# Patient Record
Sex: FEMALE | Race: WHITE | NOT HISPANIC OR LATINO | Employment: FULL TIME | URBAN - METROPOLITAN AREA
[De-identification: names, ages, dates, MRNs, and addresses within clinical notes are randomized per-mention and may not be internally consistent; named-entity substitution may affect disease eponyms.]

---

## 2022-08-12 ENCOUNTER — TELEPHONE (OUTPATIENT)
Dept: OBGYN CLINIC | Facility: CLINIC | Age: 37
End: 2022-08-12

## 2022-08-12 NOTE — TELEPHONE ENCOUNTER
8/12/22 Patient scheduled a NPN appointment for 9/19/22 with Dr Sheehan Addison  Patient is transferring from [facility] -a fertility office  I emailed her a record release-she will get her records, she understands if records not here within 48 hrs of her appointment it will be cancelled  Patient is carrying twins-lmp 6/30/22  Patient not sure of date of discharge from [facility]

## 2022-08-24 NOTE — TELEPHONE ENCOUNTER
Per patient she has her last visit Kamari 8/25 (8 weeks), her MR Release is completed & will be given to [facility] office to fax all records , Nurse Yue Mesa is aware of the process

## 2022-08-31 NOTE — TELEPHONE ENCOUNTER
It looks like we received reports from [facility], please double check and let me know it we are missing anything for her 1st PN appointment   thanks

## 2022-09-13 ENCOUNTER — TELEPHONE (OUTPATIENT)
Dept: OBGYN CLINIC | Facility: CLINIC | Age: 37
End: 2022-09-13

## 2022-09-13 NOTE — TELEPHONE ENCOUNTER
Tanya potts she will be a new patient with [facility] next week  Transferring from fertility  Her fertility  has recommended cell free dna testing- materniti 21  She is asking if she should have this done and if that is what [facility] recommends  Return call to Tanya POTTS on v/m according to reviewed records she will be 11w6d at time of visit with [facility]  [facility] will provide referral for Maternal fetal medicine to complete NIPT testing  MFM prefers [facility] testing  Suggested she wait to have testing done with M who will be following her throughout remainder of pregnancy  Ultimately the choice is hers if she chooses to complete bloodwork now  C/B if has additiona questions or concerns

## 2022-09-19 ENCOUNTER — INITIAL PRENATAL (OUTPATIENT)
Dept: OBGYN CLINIC | Facility: CLINIC | Age: 37
End: 2022-09-19

## 2022-09-19 VITALS
DIASTOLIC BLOOD PRESSURE: 64 MMHG | HEIGHT: 72 IN | BODY MASS INDEX: 23.49 KG/M2 | WEIGHT: 173.4 LBS | SYSTOLIC BLOOD PRESSURE: 128 MMHG

## 2022-09-19 DIAGNOSIS — O31.21X1 TWIN PREGNANCY WITH SINGLE INTRAUTERINE DEATH IN FIRST TRIMESTER, FETUS 1 OF MULTIPLE GESTATION: ICD-10-CM

## 2022-09-19 DIAGNOSIS — Z36.89 ENCOUNTER FOR OTHER SPECIFIED ANTENATAL SCREENING: Primary | ICD-10-CM

## 2022-09-19 DIAGNOSIS — Z36.9 ENCOUNTER FOR ANTENATAL SCREENING: ICD-10-CM

## 2022-09-19 DIAGNOSIS — N97.9 INFERTILITY, FEMALE: ICD-10-CM

## 2022-09-19 DIAGNOSIS — O09.511 AMA (ADVANCED MATERNAL AGE) PRIMIGRAVIDA 35+, FIRST TRIMESTER: ICD-10-CM

## 2022-09-19 DIAGNOSIS — O09.811 PREGNANCY RESULTING FROM IN VITRO FERTILIZATION IN FIRST TRIMESTER: ICD-10-CM

## 2022-09-19 DIAGNOSIS — O30.009 TWIN PREGNANCY RESULTING FROM ASSISTED REPRODUCTIVE TECHNOLOGY (ART): Primary | ICD-10-CM

## 2022-09-19 DIAGNOSIS — O09.819 TWIN PREGNANCY RESULTING FROM ASSISTED REPRODUCTIVE TECHNOLOGY (ART): Primary | ICD-10-CM

## 2022-09-19 PROBLEM — O09.521 MULTIGRAVIDA OF ADVANCED MATERNAL AGE IN FIRST TRIMESTER: Status: RESOLVED | Noted: 2022-09-19 | Resolved: 2022-09-19

## 2022-09-19 PROBLEM — O09.521 MULTIGRAVIDA OF ADVANCED MATERNAL AGE IN FIRST TRIMESTER: Status: ACTIVE | Noted: 2022-09-19

## 2022-09-19 LAB
EXTERNAL CHLAMYDIA SCREEN: NEGATIVE
EXTERNAL GONORRHEA SCREEN: NEGATIVE
SL AMB  POCT GLUCOSE, UA: NEGATIVE
SL AMB POCT URINE PROTEIN: NEGATIVE

## 2022-09-19 RX ORDER — PROGESTERONE 200 MG/1
CAPSULE ORAL
COMMUNITY
End: 2022-10-12

## 2022-09-19 RX ORDER — DEXTROAMPHETAMINE SACCHARATE, AMPHETAMINE ASPARTATE, DEXTROAMPHETAMINE SULFATE AND AMPHETAMINE SULFATE 7.5; 7.5; 7.5; 7.5 MG/1; MG/1; MG/1; MG/1
30 TABLET ORAL DAILY
COMMUNITY
End: 2022-10-12

## 2022-09-19 RX ORDER — PROGESTERONE 100 MG/1
100 INSERT VAGINAL 2 TIMES DAILY
COMMUNITY
End: 2022-09-28 | Stop reason: SDUPTHER

## 2022-09-19 NOTE — PROGRESS NOTES
Patient called after speaking with Dr Nathalia Flores and updating that aderall was prescribed for inflammation from fertility specialist but recently told pt she could discontinue  Dr Nathalia Flores okay with pt discontinuing aderall nut also states it is okay if pt wants to resume  Until speaking with MFM in the next week at NT ultrasound  Patient understands and will probably continue taking until MFM visit

## 2022-09-19 NOTE — PROGRESS NOTES
OB INTAKE INTERVIEW  Patient is 29 y  o who presents for OB intake at 11 6 wks  She is accompanied by: Spouse   The father of her baby Henjacky Ingram) ispresent and supportive    Last Menstrual Period: 2022  Ultrasound: Measured 2022 weeks 8 3/8 42 days on 2022  Estimated Date of Delivery: 2023 confirmed by 12 1 week US    Signs/Symptoms of Pregnancy  Current pregnancy symptoms: nausea  Constipation no  Headaches no  Cramping/spotting no  PICA cravings no    Diabetes-  Body mass index is 22 88 kg/m²  If patient has 1 or more, please order early 1 hour GTT  History of GDM no  BMI >30 no  History of PCOS or current metformin use no  History of LGA/macrosomic infant (4000g/9lbs) no    If patient has 2 or more, please order early 1 hour GTT  AMA YES  First degree relative with type 2 diabetes no  History of chronic HTN, hyperlipidemia, elevated A1C no  High risk race (, , ,  or ) no    Hypertension- if you answer yes, please order preeclampsia labs (cbc, comprehensive metabolic panel, urine protein creatinine ratio, uric acid)  History of of chronic HTN no  History of gestational HTN no  History of preeclampsia, eclampsia, or HELLP syndrome no  History of diabetes no  History of lupus, autoimmune disease, kidney disease, antiphospholipid syndrome, rheumatoid arthritis, sjogren's syndrome no    Thyroid- if yes order TSH with reflex T4 (Unless TSH value on file within last 4 weeks then do not need to order)  History of thyroid disease no    Bleeding Disorder or Hx of DVT-patient or first degree relative with history of  Order the following if not done previously     (Factor V, antithrombin III, prothrombin gene mutation, protein C and S Ag, lupus anticoagulant, anticardiolipin, beta-2 glycoprotein)   no    OB/GYN-  History of abnormal pap smear no  History of HPV no  History of Herpes/HSV no  History of other STI (gonorrhea, chlamydia, trich) (or current partner with Hep B, Hep C, or HIV) no  History of prior  no  History of prior  no  History of  delivery prior to 36 weeks 6 days no  History of blood transfusion YES  Ok for blood transfusion YES    Substance screening-   History of tobacco use no  Currently using tobacco no  Currently using alcohol no  Presently using drugs no  Past drug use (if yes, order urine drug screening panel)  no  IV drug use (if yes, order urine drug screening panel) no  Partner drug use (if yes, order urine drug screening panel) no  Parent/Family drug use (do not order urine drug screening panel just for family hx) no    Depression Screening-  PHQ-9 Score: (1)    MRSA Screening-   Does the pt have a hx of MRSA? no  If yes- please follow MRSA protocol and obtain a nasal swab for MRSA culture at 12 week visit  Immunizations:  Influenza vaccine given this season (ask date) declined  Discussed Tdap vaccine (ask date) unsure  Discussed COVID Vaccine (request pt upload card or bring to next visit) YES, RECEIVED J &J 10/22/2021    Genetic/[facility]-  Do you or your partner have a history of any of the following in yourselves or first degree relatives? Cystic fibrosis no  Spinal muscular atrophy no  Hemoglobinopathy/Sickle Cell/Thalassemia no  Fragile X Intellectual Disability no    If yes, discuss carrier screening and recommend consultation with [facility]/genetic counseling  If no, discuss option for carrier screening and/or genetic testing with [facility]   Patient interested YES  Appointment at [facility] made  [address] education  [facility] Pregnancy Essentials Book reviewed and discussed YES      Prenatal lab work scripts YES    Extra labs ordered:  NONE    The patient has a history now or in prior pregnancy notable for:  MISSED AB TWIN #1, IVF    Details that I feel the provider should be aware of:   AMA  Di-Di IVF pregnancy, Twin #1 missed AB by ultrasound on 2022  Pt was taking adderall prescribed by [facility]- fertility for "inflammation", pt given option to discontinue now at 12 weeks or check with MFM at NT ultrasound appt  The patient was oriented to our practice, reviewed delivering physicians and Anna Taylor in [facility] for Delivery  All questions were answered      Interviewed by: Debi Sicard RN

## 2022-09-19 NOTE — ASSESSMENT & PLAN NOTE
Di-Di Twins via I V F  under [employer] (AYLEEN)  Start  mg P O  at 12 wks  Referred to [facility] for  testing

## 2022-09-22 LAB
C TRACH RRNA SPEC QL NAA+PROBE: NEGATIVE
N GONORRHOEA RRNA SPEC QL NAA+PROBE: NEGATIVE

## 2022-09-24 PROBLEM — O31.20X0 TWIN PREGNANCY WITH SINGLE INTRAUTERINE DEATH: Status: ACTIVE | Noted: 2022-09-24

## 2022-09-27 ENCOUNTER — TELEPHONE (OUTPATIENT)
Dept: PERINATAL CARE | Facility: CLINIC | Age: 37
End: 2022-09-27

## 2022-09-27 PROBLEM — O31.10X0 VANISHING TWIN SYNDROME: Status: ACTIVE | Noted: 2022-09-24

## 2022-09-27 LAB
EXTERNAL ANTIBODY SCREEN: NORMAL
EXTERNAL HEMOGLOBIN: 12.3 G/DL
EXTERNAL HEPATITIS B SURFACE ANTIGEN: NEGATIVE
EXTERNAL HIV-1/2 AB-AG: NORMAL
EXTERNAL PLATELET COUNT: 272 K/ÂΜL
EXTERNAL RH FACTOR: NEGATIVE
EXTERNAL RUBELLA IGG QUANTITATION: NORMAL
EXTERNAL SYPHILIS RPR SCREEN: NORMAL

## 2022-09-27 NOTE — TELEPHONE ENCOUNTER
Left patient a message that her MFM appointment had to be rescheduled  The new time, date and location were provided - 11/18/22  6:30am  [place]  The patient has been instructed to please call us back at [phone] with any questions or concerns

## 2022-09-28 ENCOUNTER — ROUTINE PRENATAL (OUTPATIENT)
Dept: PERINATAL CARE | Facility: OTHER | Age: 37
End: 2022-09-28
Payer: COMMERCIAL

## 2022-09-28 VITALS
WEIGHT: 177.4 LBS | HEIGHT: 72 IN | DIASTOLIC BLOOD PRESSURE: 76 MMHG | SYSTOLIC BLOOD PRESSURE: 122 MMHG | BODY MASS INDEX: 24.03 KG/M2 | HEART RATE: 84 BPM

## 2022-09-28 DIAGNOSIS — O09.811 PREGNANCY RESULTING FROM IN VITRO FERTILIZATION IN FIRST TRIMESTER: Primary | ICD-10-CM

## 2022-09-28 DIAGNOSIS — Z36.9 ENCOUNTER FOR ANTENATAL SCREENING: ICD-10-CM

## 2022-09-28 DIAGNOSIS — Z36.82 ENCOUNTER FOR (NT) NUCHAL TRANSLUCENCY SCAN: ICD-10-CM

## 2022-09-28 DIAGNOSIS — O09.511 AMA (ADVANCED MATERNAL AGE) PRIMIGRAVIDA 35+, FIRST TRIMESTER: ICD-10-CM

## 2022-09-28 DIAGNOSIS — Z3A.13 13 WEEKS GESTATION OF PREGNANCY: ICD-10-CM

## 2022-09-28 DIAGNOSIS — O31.10X0 VANISHING TWIN SYNDROME: ICD-10-CM

## 2022-09-28 LAB
ABO GROUP BLD: NORMAL
APPEARANCE UR: CLEAR
BACTERIA UR CULT: NORMAL
BACTERIA URNS QL MICRO: ABNORMAL
BASOPHILS # BLD AUTO: 0 X10E3/UL (ref 0–0.2)
BASOPHILS NFR BLD AUTO: 0 %
BILIRUB UR QL STRIP: NEGATIVE
BLD GP AB SCN SERPL QL: NEGATIVE
CASTS URNS QL MICRO: ABNORMAL /LPF
COLOR UR: YELLOW
EOSINOPHIL # BLD AUTO: 0.2 X10E3/UL (ref 0–0.4)
EOSINOPHIL NFR BLD AUTO: 2 %
EPI CELLS #/AREA URNS HPF: ABNORMAL /HPF (ref 0–10)
ERYTHROCYTE [DISTWIDTH] IN BLOOD BY AUTOMATED COUNT: 11.8 % (ref 11.7–15.4)
GLUCOSE UR QL: NEGATIVE
HBV SURFACE AG SERPL QL IA: NEGATIVE
HCT VFR BLD AUTO: 36.7 % (ref 34–46.6)
HCV AB S/CO SERPL IA: <0.1 S/CO RATIO (ref 0–0.9)
HGB BLD-MCNC: 12.3 G/DL (ref 11.1–15.9)
HGB UR QL STRIP: NEGATIVE
HIV 1+2 AB+HIV1 P24 AG SERPL QL IA: NON REACTIVE
IMM GRANULOCYTES # BLD: 0 X10E3/UL (ref 0–0.1)
IMM GRANULOCYTES NFR BLD: 0 %
KETONES UR QL STRIP: NEGATIVE
LEUKOCYTE ESTERASE UR QL STRIP: ABNORMAL
LYMPHOCYTES # BLD AUTO: 2 X10E3/UL (ref 0.7–3.1)
LYMPHOCYTES NFR BLD AUTO: 28 %
Lab: NORMAL
MCH RBC QN AUTO: 31.8 PG (ref 26.6–33)
MCHC RBC AUTO-ENTMCNC: 33.5 G/DL (ref 31.5–35.7)
MCV RBC AUTO: 95 FL (ref 79–97)
MICRO URNS: ABNORMAL
MONOCYTES # BLD AUTO: 0.4 X10E3/UL (ref 0.1–0.9)
MONOCYTES NFR BLD AUTO: 6 %
NEUTROPHILS # BLD AUTO: 4.5 X10E3/UL (ref 1.4–7)
NEUTROPHILS NFR BLD AUTO: 64 %
NITRITE UR QL STRIP: NEGATIVE
PH UR STRIP: 7.5 [PH] (ref 5–7.5)
PLATELET # BLD AUTO: 272 X10E3/UL (ref 150–450)
PROT UR QL STRIP: NEGATIVE
RBC # BLD AUTO: 3.87 X10E6/UL (ref 3.77–5.28)
RBC #/AREA URNS HPF: ABNORMAL /HPF (ref 0–2)
RH BLD: NEGATIVE
RPR SER QL: NON REACTIVE
RUBV IGG SERPL IA-ACNC: 3.06 INDEX
SP GR UR: 1.01 (ref 1–1.03)
UROBILINOGEN UR STRIP-ACNC: 0.2 MG/DL (ref 0.2–1)
WBC # BLD AUTO: 7.1 X10E3/UL (ref 3.4–10.8)
WBC #/AREA URNS HPF: ABNORMAL /HPF (ref 0–5)

## 2022-09-28 PROCEDURE — 99241 PR OFFICE CONSULTATION NEW/ESTAB PATIENT 15 MIN: CPT | Performed by: OBSTETRICS & GYNECOLOGY

## 2022-09-28 PROCEDURE — 76813 OB US NUCHAL MEAS 1 GEST: CPT | Performed by: OBSTETRICS & GYNECOLOGY

## 2022-09-28 PROCEDURE — 36415 COLL VENOUS BLD VENIPUNCTURE: CPT | Performed by: OBSTETRICS & GYNECOLOGY

## 2022-09-28 PROCEDURE — 76801 OB US < 14 WKS SINGLE FETUS: CPT | Performed by: OBSTETRICS & GYNECOLOGY

## 2022-09-28 PROCEDURE — 99202 OFFICE O/P NEW SF 15 MIN: CPT | Performed by: OBSTETRICS & GYNECOLOGY

## 2022-09-28 RX ORDER — ASPIRIN 81 MG/1
TABLET, CHEWABLE ORAL
COMMUNITY
Start: 2022-09-20

## 2022-09-28 NOTE — PROGRESS NOTES
OFFICE CONSULT  Referring physician:   Do BROOKLYN Pulliam [phone]  [facility] [place],  [address]      Dear Dr Niels Mcdermott      Thank you for requesting a  consultation on your patient Ms Negro Webb for the following indications:  Genetic screening she is here today with her partner Mireille Hassan    This pregnancy was conceived through IVF  She had an egg retrieval done at age 29  Pre implantation genetics was not completed  She had 2 embryos transferred on day 3 of life  This transfer occurred on 7/15/22 which gave her a due date of 23  Her AYLEEN is [employer]  Prior to pregnancy she completed the comprehensive inherit test which was negative  Baseline TSH was 1 89  Blood type is O negative  History  Medications: Adderall 30 milligrams started for inflammation for endometriosis Dr Phil Chanel  She was on 60 milligrams and is currently weaning off and is now down to 30 milligrams  Progesterone 200 milligrams orally and is also currently weaning this down  162 milligrams of baby aspirin daily (started 22) and prenatal vitamins daily  Allergies to medications:  None  Past medical history:  Infertility and advanced maternal age  Past surgical history:  [place] tooth extraction  Past obstetrical history:   1 para 0  In this pregnancy she had twins initially with loss of 1 at approximately 8 weeks  Social history:  History of smoking in the past but denies substance use in pregnancy  First generation family history:  Her mother had preeclampsia    Ultrasound findings: The ultrasound shows a fetus concordant with dates  The nasal bone and nuchal translucency appears normal  No malformations are seen on today's early ultrasound  There is a 2nd sac with a small nonviable fetal pole seen  The patient was informed of the findings and counseled about the limitations of the exam in detecting all forms of fetal congenital abnormalities      She does not report any vaginal bleeding or uterine cramping or contractions  Specific counseling was provided on the following problems:  1  We discussed the options for genetic screening which include invasive testing on the fetal placenta or on fetal skin cells within the amniotic fluid and compared this to noninvasive testing which includes cell free DNA screening and the sequential screen  We reviewed the risks, the benefits and the limitations of each  In the end patient chose to complete the cell free DNA screen  The parents are aware that due to this twin pregnancy with a demise there is a chance that fetal DNA from the demise may be detected  An early demise has approximately a 48 percent chance of having a chromosome problem as the cause  Hence if NIPT returns with a chromosome problem there is a chance the test is falsely positive because of her prior demise  In that case we would bring her in for a 16 week early anatomy scan in addition to her 20 week anatomy scan  The parents are aware the only way to reliably detect only the chromosomes for the remaining viable twin is with invasive testing which they are not interested in doing at this time  2  [place] IVF pregnancies with or without ICSI are at a higher risk of  birth, preeclampsia and low birth weight ( <2500 gms)  Women who undergo ART appear to be at increased risk of delivering offspring with congenital malformations compared with fertile women who conceive naturally, but the reason for this increase is unclear  The increased risk of birth defects was observed for all major organ systems, and was highest for the nervous system  A small increased risk for cardiac malformations is also noted  Daily baby aspirin 162 mgs started before 16 weeks and continued till 36 weeks may lessen her risk for preeclampsia to develop    In patients who conceived through IVF we recommend a 20 week anatomy scan, 22-24 week fetal echo, 32 week fetal growth and weekly nonstress tests and fluid scans starting at 36 weeks  3  Based on her age of 29 she may be at a higher risk for developing diabetes in pregnancy  I added blood work for diabetes screening to the lab work her OB office has already ordered that she needs to complete  Future tests recommended:  1  The results of her NIPT will return in 7-10 days and her OB office will order an MSAFP screen at 16-18 weeks to screen for spina bifida  Future ultrasounds ordered today:   1  Fetal Level II ultrasound imaging is recommended at 19-20 weeks' gestation  2  Fetal echo is scheduled for 22-24 weeks         The face to face time, in addition to time spent discussing ultrasound results, was approximately 15 minutes, greater than 50% of which was spent during counseling and coordination of care    Marylen Bryant, MD

## 2022-09-28 NOTE — PROGRESS NOTES
Patient chose to have [facility] Non-invasive Prenatal Screen  Patient given brochure and is aware [facility] will contact patients insurance and coordinate coverage  Patient made aware she will need to respond to text message or e-mail from [facility] within 2 business days or testing will be run through insurance  Patient informed text message will come from area code  "415"  Provided [facility] Client Services # [phone] and web site : [email or web address]     2 vials of blood drawn from RT arm, patient tolerated blood draw without difficulty  Specimens labeled with patient identifiers (name, date of birth, specimen collection date), order and specimen was verified with patient, packed and sent via [employer]  Copy of lab order scanned to Epic media  Maternal Fetal Medicine will have results in approximately 7-10 business days and will call patient or notify via [address]  Patient aware viewing lab result online will reveal fetal sex If ordered  Patient verbalized understanding of all instructions and no questions at this time

## 2022-09-28 NOTE — LETTER
2022     Marilee Hall, [email or web address] [facility] [address]  [place]    Patient: Baljit Croft   YOB: 1985   Date of Visit: 2022       Dear Dr Alem Abarca: Thank you for referring Tacho Carroll to me for evaluation  Below are my notes for this consultation  If you have questions, please do not hesitate to call me  I look forward to following your patient along with you  Sincerely,        Daniel Stewart MD        CC: No Recipients  Daniel Stewart MD  2022  8:13 PM  Sign when Signing Visit  OFFICE CONSULT  Referring physician:   Marilee Hall, Do  [phone]  [place],  [address]      Dear Dr Alem Abarca      Thank you for requesting a  consultation on your patient Ms Baljit Croft for the following indications:  Genetic screening she is here today with her partner Masoud Green    This pregnancy was conceived through IVF  She had an egg retrieval done at age 29  Pre implantation genetics was not completed  She had 2 embryos transferred on day 3 of life  This transfer occurred on 7/15/22 which gave her a due date of 23  Her AYLEEN is [employer]  Prior to pregnancy she completed the comprehensive inherit test which was negative  Baseline TSH was 1 89  Blood type is O negative  History  Medications: Adderall 30 milligrams started for inflammation for endometriosis Dr Davis Nix  She was on 60 milligrams and is currently weaning off and is now down to 30 milligrams  Progesterone 200 milligrams orally and is also currently weaning this down  162 milligrams of baby aspirin daily (started 22) and prenatal vitamins daily  Allergies to medications:  None  Past medical history:  Infertility and advanced maternal age  Past surgical history:  [place] tooth extraction  Past obstetrical history:   1 para 0    In this pregnancy she had twins initially with loss of 1 at approximately 8 weeks  Social history:  History of smoking in the past but denies substance use in pregnancy  First generation family history:  Her mother had preeclampsia    Ultrasound findings: The ultrasound shows a fetus concordant with dates  The nasal bone and nuchal translucency appears normal  No malformations are seen on today's early ultrasound  There is a 2nd sac with a small nonviable fetal pole seen  The patient was informed of the findings and counseled about the limitations of the exam in detecting all forms of fetal congenital abnormalities  She does not report any vaginal bleeding or uterine cramping or contractions  Specific counseling was provided on the following problems:  1  We discussed the options for genetic screening which include invasive testing on the fetal placenta or on fetal skin cells within the amniotic fluid and compared this to noninvasive testing which includes cell free DNA screening and the sequential screen  We reviewed the risks, the benefits and the limitations of each  In the end patient chose to complete the cell free DNA screen  The parents are aware that due to this twin pregnancy with a demise there is a chance that fetal DNA from the demise may be detected  An early demise has approximately a 48 percent chance of having a chromosome problem as the cause  Hence if NIPT returns with a chromosome problem there is a chance the test is falsely positive because of her prior demise  In that case we would bring her in for a 16 week early anatomy scan in addition to her 20 week anatomy scan  The parents are aware the only way to reliably detect only the chromosomes for the remaining viable twin is with invasive testing which they are not interested in doing at this time  2  [place] IVF pregnancies with or without ICSI are at a higher risk of  birth, preeclampsia and low birth weight ( <2500 gms)    Women who undergo ART appear to be at increased risk of delivering offspring with congenital malformations compared with fertile women who conceive naturally, but the reason for this increase is unclear  The increased risk of birth defects was observed for all major organ systems, and was highest for the nervous system  A small increased risk for cardiac malformations is also noted  Daily baby aspirin 162 mgs started before 16 weeks and continued till 36 weeks may lessen her risk for preeclampsia to develop  In patients who conceived through IVF we recommend a 20 week anatomy scan, 22-24 week fetal echo, 32 week fetal growth and weekly nonstress tests and fluid scans starting at 36 weeks  3  Based on her age of 29 she may be at a higher risk for developing diabetes in pregnancy  I added blood work for diabetes screening to the lab work her OB office has already ordered that she needs to complete  Future tests recommended:  1  The results of her NIPT will return in 7-10 days and her OB office will order an MSAFP screen at 16-18 weeks to screen for spina bifida  Future ultrasounds ordered today:   1  Fetal Level II ultrasound imaging is recommended at 19-20 weeks' gestation  2  Fetal echo is scheduled for 22-24 weeks         The face to face time, in addition to time spent discussing ultrasound results, was approximately 15 minutes, greater than 50% of which was spent during counseling and coordination of care    Jacobo Pisano MD

## 2022-09-30 NOTE — PROGRESS NOTES
First OB Visit  [address], [address], [place] [address]    Assessment/Plan:  Jose Callaway is a 29y o  year old  at 13w3d who presents for initial prenatal visit with twin pregnancy via I V F  One fetus with FHT and EGA 12 1 weeks, second twin without fetal heart motion at EGA  8w 4d c/w fetal demise  Final TIFFANY: 2023, by Est  Date of Conception      Supervision of normal pregnancy  - Aneuploidy screening discussed  Patient is undecided regarding aneuploidy screening   - Routine cervical cancer screening: Pap Up to date  - Routine STI Screening: GC/Chlamydia sent today  HIV/Hep B/Hep C/Syphilis ordered in prenatal panel   - Patient Education: Patient was counseled regarding diet, exercise, weight gain, foods to avoid, vaccines in pregnancy, aneuploidy screening, travel precautions to include seat belt use and VTE risk reduction  She has been provided our pregnancy packet which includes how and when to contact providers, medication recommendations, dietary suggestions, breastfeeding information as well as websites for additional information, hospital and delivery concerns  Additional Pregnancy Problems:   1  Twin pregnancy resulting from assisted reproductive technology (ART)  Assessment & Plan:  Di-Di Twins via I V F  under [facility]  mg at 12 weeks             One fetus with FHT and EGA 12 1 weeks, second twin without fetal heart motion at EGA  8w 4d c/w fetal demise and currently SIUP      2  Pregnancy resulting from in vitro fertilization in first trimester  Assessment & Plan:  Di-Di Twins via I V F  under [employer] (AYLEEN)  Start  mg P O  at 12 wks  Referred to [facility] for  testing    3  AMA (advanced maternal age) primigravida 33+, first trimester  Assessment & Plan:   mg starting at 12 wks    4  Infertility, female     Twin pregnancy via I  V F    5  Encounter for  screening  -     Chlamydia/GC amplified DNA by PCR  -     Obstetric Panel W/Fourth Generation HIV  -     Hepatitis C antibody  -     Urinalysis with microscopic  -     Urine culture  -     Ambulatory Referral to Maternal Fetal Medicine; Future; Expected date: 2022  -     POCT urine dip    6  Twin pregnancy with single intrauterine death in first trimester, fetus 1 of multiple gestation           One fetus with FHT and EGA 12 1 weeks, second twin without fetal heart motion at EGA  8w 4d c/w fetal demise  Informed patient of findings  Follow up U/S scheduled with MFM            MFM referral given for early anatomy and aneuploidy screening, due 11-13 weeks  Next OB visit: 4 wks    Subjective:   CC:  Desires prenatal care  Lauren Morales is a 29 y o   female who presents for prenatal care  Patient's last menstrual period was 2022  Which would make her 11 weeks and 6 days pregnant today  Pregnancy ROS: no leakage of fluid, pelvic pain, or vaginal bleeding  no nausea/vomiting  OB History    Para Term  AB Living   1 0 0 0 0 0   SAB IAB Ectopic Multiple Live Births   0 0 0 0 0      # Outcome Date GA Lbr Beny/2nd Weight Sex Delivery Anes PTL Lv   1 Current                The following portions of the patient's history were reviewed and updated as appropriate: She  has no past medical history on file  She  has a past surgical history that includes [place] tooth extraction  Her family history includes Cancer in her paternal grandfather; Diabetes in her maternal grandmother; Hypertension in her paternal grandfather and paternal grandmother  She  reports that she has quit smoking  Her smoking use included cigarettes  She has never used smokeless tobacco  She reports previous alcohol use  She reports that she does not use drugs    Current Outpatient Medications   Medication Sig Dispense Refill    amphetamine-dextroamphetamine (ADDERALL, 30MG,) 30 MG tablet Take 30 mg by mouth daily      aspirin 81 mg chewable tablet       Calcium Carbonate (CALCIUM 500 PO) Take by mouth 1000mg daily      Prenatal MV-Min-Fe Fum-FA-DHA (PRENATAL 1 PO) Take by mouth      Progesterone (Prometrium) 200 MG CAPS Take by mouth       No current facility-administered medications for this visit  She has No Known Allergies         Objective:  LMP 2022   Breastfeeding Unknown   Pregravid Weight/BMI: Pregravid weight not on file (BMI Could not be calculated)  Current Weight:     Total Weight Gain: Not found  Pre-Katelynn Vitals    Flowsheet Row Most Recent Value   Prenatal Assessment    Fetal Heart Rate 150-160   Fundal Height (cm) 12 cm   Movement Absent   Prenatal Vitals    Urine Albumin/Glucose    Dilation/Effacement/Station    Cervical Dilation 0   Cervical Effacement 0   Vaginal Drainage    Draining Fluid No   Edema          General: Well appearing, no distress  Breasts: Normal bilaterally, nontender without masses, asymmetry, or nipple discharge  Abdomen: Soft, gravid, nontender  : Urethra normal  Normal labia majora and minora  Vagina normal   No vaginal bleeding  No vaginal discharge  Cervix closed  Uterus non-tender  Extremities: Warm and well perfused  Non tender  No edema      Ultrasound: ultrasound confirmed SLIUP, see report for details

## 2022-10-12 DIAGNOSIS — Z3A.15 15 WEEKS GESTATION OF PREGNANCY: ICD-10-CM

## 2022-10-12 DIAGNOSIS — E83.51 HYPOCALCEMIA: Primary | ICD-10-CM

## 2022-10-12 LAB
ALBUMIN SERPL-MCNC: 3.8 G/DL (ref 3.8–4.8)
ALBUMIN/GLOB SERPL: 1.7 {RATIO} (ref 1.2–2.2)
ALP SERPL-CCNC: 40 IU/L (ref 44–121)
ALT SERPL-CCNC: 17 IU/L (ref 0–32)
AST SERPL-CCNC: 14 IU/L (ref 0–40)
BILIRUB SERPL-MCNC: 0.2 MG/DL (ref 0–1.2)
BUN SERPL-MCNC: 8 MG/DL (ref 6–20)
BUN/CREAT SERPL: 12 (ref 9–23)
CALCIUM SERPL-MCNC: 8.5 MG/DL (ref 8.7–10.2)
CHLORIDE SERPL-SCNC: 104 MMOL/L (ref 96–106)
CO2 SERPL-SCNC: 21 MMOL/L (ref 20–29)
CREAT SERPL-MCNC: 0.67 MG/DL (ref 0.57–1)
EGFR: 117 ML/MIN/1.73
EST. AVERAGE GLUCOSE BLD GHB EST-MCNC: 94 MG/DL
GLOBULIN SER-MCNC: 2.2 G/DL (ref 1.5–4.5)
GLUCOSE 1H P 50 G GLC PO SERPL-MCNC: 86 MG/DL (ref 70–139)
GLUCOSE SERPL-MCNC: 97 MG/DL (ref 70–99)
HBA1C MFR BLD: 4.9 % (ref 4.8–5.6)
POTASSIUM SERPL-SCNC: 4.2 MMOL/L (ref 3.5–5.2)
PROT SERPL-MCNC: 6 G/DL (ref 6–8.5)
SODIUM SERPL-SCNC: 137 MMOL/L (ref 134–144)

## 2022-10-12 NOTE — RESULT ENCOUNTER NOTE
Tanya Barajas,    Your testing for diabetes showed a normal glucola and normal hemoglobin A1c  A complete metabolic panel was done at the same time so the blood sugar in the panel was high for pregnancy if you were fasting but it looks like this lab was drawn after you drank the sugar drink  Your calcium level was mildly low  Your med list suggests you are already on a calcium supplement  Your records at [facility] do not have prior calcium levels that I can review  In looking up your meds it states that if we give you more calcium carbonate that it will raise your levels of adderall in your blood stream  You had told me this medication was going to be stopped soon  I would recommend we check another calcium level along with a vit d level after you are off of the Adderall to see if this is truly low or just a false positive result  A common reason for low calcium levels are low vit D levels  I am sharing this discussion with Dr Cielo Peterson who is seeing you next on 10/19/22      Kika Hansen

## 2022-10-12 NOTE — PROGRESS NOTES
Confirmed Fantasma Wynne is not taking adderall now  Will retest her ca++ levels and add vit D and magnesium levels as deficiencies in vit d and magnesium can cause hypocalcemia  Patient is aware through a my chart message that orders have been placed       Kendall Galvez

## 2022-10-19 ENCOUNTER — ROUTINE PRENATAL (OUTPATIENT)
Dept: OBGYN CLINIC | Facility: CLINIC | Age: 37
End: 2022-10-19

## 2022-10-19 VITALS
WEIGHT: 178 LBS | DIASTOLIC BLOOD PRESSURE: 72 MMHG | BODY MASS INDEX: 24.11 KG/M2 | SYSTOLIC BLOOD PRESSURE: 112 MMHG | HEIGHT: 72 IN

## 2022-10-19 DIAGNOSIS — Z36.1 SCREENING FOR RAISED ALPHA-FETOPROTEIN LEVELS IN AMNIOTIC FLUID: ICD-10-CM

## 2022-10-19 DIAGNOSIS — Z3A.16 16 WEEKS GESTATION OF PREGNANCY: ICD-10-CM

## 2022-10-19 DIAGNOSIS — O09.812 PREGNANCY RESULTING FROM IN VITRO FERTILIZATION IN SECOND TRIMESTER: Primary | ICD-10-CM

## 2022-10-19 DIAGNOSIS — O09.512 AMA (ADVANCED MATERNAL AGE) PRIMIGRAVIDA 35+, SECOND TRIMESTER: ICD-10-CM

## 2022-10-19 LAB
SL AMB  POCT GLUCOSE, UA: NORMAL
SL AMB POCT URINE PROTEIN: NORMAL

## 2022-10-27 LAB
2ND TRIMESTER 4 SCREEN SERPL-IMP: NORMAL
AFP ADJ MOM SERPL: 0.98
AFP INTERP AMN-IMP: NORMAL
AFP INTERP SERPL-IMP: NORMAL
AFP INTERP SERPL-IMP: NORMAL
AFP SERPL-MCNC: 35.2 NG/ML
AGE AT DELIVERY: 36.3 YR
GA METHOD: NORMAL
GA: 17 WEEKS
IDDM PATIENT QL: NO
MULTIPLE PREGNANCY: NO
NEURAL TUBE DEFECT RISK FETUS: NORMAL %

## 2022-11-15 PROBLEM — Z67.91 RH NEGATIVE STATE IN ANTEPARTUM PERIOD: Status: ACTIVE | Noted: 2022-11-15

## 2022-11-15 PROBLEM — O26.899 RH NEGATIVE STATE IN ANTEPARTUM PERIOD: Status: ACTIVE | Noted: 2022-11-15

## 2022-11-15 PROBLEM — Z3A.20 20 WEEKS GESTATION OF PREGNANCY: Status: ACTIVE | Noted: 2022-11-15

## 2022-11-15 PROBLEM — O31.21X1: Status: ACTIVE | Noted: 2022-09-19

## 2022-11-16 ENCOUNTER — ROUTINE PRENATAL (OUTPATIENT)
Dept: OBGYN CLINIC | Facility: CLINIC | Age: 37
End: 2022-11-16

## 2022-11-16 VITALS — WEIGHT: 188 LBS | SYSTOLIC BLOOD PRESSURE: 110 MMHG | BODY MASS INDEX: 24.8 KG/M2 | DIASTOLIC BLOOD PRESSURE: 70 MMHG

## 2022-11-16 DIAGNOSIS — Z67.91 RH NEGATIVE STATE IN ANTEPARTUM PERIOD: ICD-10-CM

## 2022-11-16 DIAGNOSIS — O09.811 PREGNANCY RESULTING FROM IN VITRO FERTILIZATION IN FIRST TRIMESTER: Primary | ICD-10-CM

## 2022-11-16 DIAGNOSIS — Z23 FLU VACCINE NEED: ICD-10-CM

## 2022-11-16 DIAGNOSIS — Z3A.20 20 WEEKS GESTATION OF PREGNANCY: ICD-10-CM

## 2022-11-16 DIAGNOSIS — O26.899 RH NEGATIVE STATE IN ANTEPARTUM PERIOD: ICD-10-CM

## 2022-11-16 DIAGNOSIS — O09.511 AMA (ADVANCED MATERNAL AGE) PRIMIGRAVIDA 35+, FIRST TRIMESTER: ICD-10-CM

## 2022-11-16 LAB
SL AMB  POCT GLUCOSE, UA: NORMAL
SL AMB POCT URINE PROTEIN: NORMAL

## 2022-11-16 NOTE — PROGRESS NOTES
Routine Prenatal Visit  [address] [facility], [address], [facility], [address] [facility]    Assessment/Plan:  Katie James is a 29y o  year old  at 20w1d who presents for routine prenatal visit  1  Pregnancy resulting from in vitro fertilization in first trimester  Assessment & Plan:  Anatomy u/s scheduled 2022, with fetal echo 2022       2  AMA (advanced maternal age) primigravida 33+, first trimester  Assessment & Plan:  NIPT testing low risk  Pt taking ASA 162mg  3  Rh negative state in antepartum period  Assessment & Plan:  Reviewed will need Rhogam at 28 weeks - pt aware  4  Flu vaccine need  Comments:  Offered flu vaccine, discussed recommendation in pregnancy  Pt to consider and maybe next visit  5  20 weeks gestation of pregnancy  -     POCT urine dip      Next OB Visit 4 weeks  Subjective:     CC: Prenatal care    Criss Wilcox is a 29 y o  [facility] female who presents for routine prenatal care at 20w1d  Pregnancy ROS: no leakage of fluid, pelvic pain, or vaginal bleeding  Starting to feel fetal movement      The following portions of the patient's history were reviewed and updated as appropriate: allergies, current medications, past family history, past medical history, obstetric history, gynecologic history, past social history, past surgical history and problem list       Objective:  /70 (BP Location: Left arm, Patient Position: Sitting, Cuff Size: Standard)   Wt 85 3 kg (188 lb)   LMP 2022   BMI 24 80 kg/m²   Pregravid Weight/BMI: 78 5 kg (173 lb) (BMI 22 83)  Current Weight: 85 3 kg (188 lb)   Total Weight Gain: 6 804 kg (15 lb)   Pre- Vitals    Flowsheet Row Most Recent Value   Prenatal Assessment    Fetal Heart Rate 145   Fundal Height (cm) 20 cm   Movement Present   Prenatal Vitals    Blood Pressure 110/70   Weight - Scale 85 3 kg (188 lb)   Urine Albumin/Glucose    Dilation/Effacement/Station    Vaginal Drainage    Edema LLE Edema None   RLE Edema None           General: Well appearing, no distress  Abdomen: Soft, gravid, nontender  Fundal Height: Appropriate for gestational age  Extremities: Warm and well perfused  Non tender

## 2022-11-18 ENCOUNTER — TELEPHONE (OUTPATIENT)
Dept: OBGYN CLINIC | Facility: CLINIC | Age: 37
End: 2022-11-18

## 2022-11-18 NOTE — LETTER
November 18, 2022     Patient: Emilee Duron  YOB: 1985  Date of Visit: 11/18/2022      To Whom it May Concern:    Carlo Guevara is under my professional care for her pregnancy  She has a due date of 4/4/2023  We recommend limiting lifting and pushing of heavy objects to no more than 25-30lbs in pregnancy, for safety  If you have any questions or concerns, please don't hesitate to call           Sincerely,          Mike Kearns MD        CC: No Recipients

## 2022-11-18 NOTE — TELEPHONE ENCOUNTER
Letter with weight restrictions put into MyChart for pt to access for work  [facility]

## 2022-11-18 NOTE — TELEPHONE ENCOUNTER
Patient l/m stating she was in recently on 11/16/22 w/ Dr Thania Childers  She is currently 20 3wks  She states her work need a letter regarding work weight restrictions  She push heavy tanks  Dr Thania Childers please review

## 2022-11-22 NOTE — PROGRESS NOTES
Please refer to the [facility] ultrasound report in Ob Procedures for additional information regarding today's visit

## 2022-11-23 ENCOUNTER — ROUTINE PRENATAL (OUTPATIENT)
Dept: PERINATAL CARE | Facility: OTHER | Age: 37
End: 2022-11-23

## 2022-11-23 VITALS
HEIGHT: 72 IN | BODY MASS INDEX: 25.35 KG/M2 | WEIGHT: 187.2 LBS | DIASTOLIC BLOOD PRESSURE: 78 MMHG | HEART RATE: 71 BPM | SYSTOLIC BLOOD PRESSURE: 118 MMHG

## 2022-11-23 DIAGNOSIS — O09.812 PREGNANCY RESULTING FROM IN VITRO FERTILIZATION, SECOND TRIMESTER: ICD-10-CM

## 2022-11-23 DIAGNOSIS — Z3A.21 21 WEEKS GESTATION OF PREGNANCY: ICD-10-CM

## 2022-11-23 DIAGNOSIS — O44.02 PLACENTA PREVIA, SECOND TRIMESTER: ICD-10-CM

## 2022-11-23 DIAGNOSIS — O09.512 ELDERLY PRIMIGRAVIDA, SECOND TRIMESTER: Primary | ICD-10-CM

## 2022-11-23 DIAGNOSIS — Z36.86 ENCOUNTER FOR ANTENATAL SCREENING FOR CERVICAL LENGTH: ICD-10-CM

## 2022-11-23 NOTE — PROGRESS NOTES
Ultrasound Probe Disinfection    A transvaginal ultrasound was performed  Prior to use, disinfection was performed with High Level Disinfection Process (Trophon)  Probe serial number U1: [id number] was used        Calvin Taylor  11/23/22  7:59 AM

## 2022-11-23 NOTE — LETTER
November 23, 2022     Yani Ya MD  [facility] [address]  [address] [address]  [facility] [address]    Patient: Renata Redd   YOB: 1985   Date of Visit: 11/23/2022       Dear Dr Jarvis Standing: Thank you for referring Jorden Hunt to me for evaluation  Below are my notes for this consultation  If you have questions, please do not hesitate to call me  I look forward to following your patient along with you  Sincerely,        Gabriella Duque MD        CC: No Recipients  Gabriella Duque MD  11/22/2022  6:22 PM  Sign when Signing Visit  Please refer to the [facility] ultrasound report in Ob Procedures for additional information regarding today's visit

## 2022-11-28 PROBLEM — O09.512 AMA (ADVANCED MATERNAL AGE) PRIMIGRAVIDA 35+, SECOND TRIMESTER: Status: ACTIVE | Noted: 2022-09-19

## 2022-11-28 PROBLEM — O09.812 PREGNANCY RESULTING FROM IN VITRO FERTILIZATION IN SECOND TRIMESTER: Status: ACTIVE | Noted: 2022-09-19

## 2022-11-28 NOTE — PROGRESS NOTES
Routine Prenatal Visit  [address] [facility], [address], [facility], [address] [facility]    Assessment/Plan:  Kirit Ray is a 29y o  year old  at 16w1d who presents for routine prenatal visit  1  Pregnancy resulting from in vitro fertilization in second trimester    2  AMA (advanced maternal age) primigravida 33+, second trimester    3  16 weeks gestation of pregnancy  -     POCT urine dip    4  Screening for raised alpha-fetoprotein levels in amniotic fluid  -     Alpha fetoprotein, maternal; Future; Expected date: 2022  -     Alpha fetoprotein, maternal          Subjective:     CC: Prenatal care    Mariza Le is a 29 y o  [place] female who presents for routine prenatal care at 16w1d  Pregnancy ROS: no leakage of fluid, pelvic pain, or vaginal bleeding  no fetal movement  The following portions of the patient's history were reviewed and updated as appropriate: allergies, current medications, past family history, past medical history, obstetric history, gynecologic history, past social history, past surgical history and problem list       Objective:  /72 (BP Location: Left arm, Patient Position: Sitting, Cuff Size: Standard)   Ht 6' 1" (1 854 m)   Wt 80 7 kg (178 lb)   LMP 2022   BMI 23 48 kg/m²   Pregravid Weight/BMI: 78 5 kg (173 lb) (BMI 22 83)  Current Weight: 80 7 kg (178 lb)   Total Weight Gain: 2 268 kg (5 lb)   Pre-Katelynn Vitals    Flowsheet Row Most Recent Value   Prenatal Assessment    Fetal Heart Rate 150   Fundal Height (cm) 16 cm   Movement Absent   Prenatal Vitals    Blood Pressure 112/72   Weight - Scale 80 7 kg (178 lb)   Urine Albumin/Glucose    Dilation/Effacement/Station    Vaginal Drainage    Edema            General: Well appearing, no distress  Respiratory: Unlabored breathing  Cardiovascular: Regular rate  Abdomen: Soft, gravid, nontender  Fundal Height: Appropriate for gestational age  Extremities: Warm and well perfused    Non tender

## 2022-11-30 PROBLEM — O09.512 ELDERLY PRIMIGRAVIDA, SECOND TRIMESTER: Status: RESOLVED | Noted: 2022-11-23 | Resolved: 2022-11-30

## 2022-11-30 PROBLEM — Z3A.22 22 WEEKS GESTATION OF PREGNANCY: Status: ACTIVE | Noted: 2022-11-15

## 2022-11-30 NOTE — PATIENT INSTRUCTIONS
Thank you for choosing us for your  care today  If you have any questions about your ultrasound or care, please do not hesitate to contact us or your primary obstetrician  Some general instructions for your pregnancy are:    Exercise: Aim for 22 minutes per day (150 minutes per week) of regular exercise  Walking is great! Nutrition: Choose healthy sources of calcium, iron, and protein  Learn about Preeclampsia: preeclampsia is a common, serious high blood pressure complication in pregnancy  A blood pressure of 125INER (systolic or top number) or 72WJNV (diastolic or bottom number) is not normal and needs evaluation by your doctor  Aspirin is sometimes prescribed in early pregnancy to prevent preeclampsia in women with risk factors - ask your obstetrician if you should be on this medication  If you smoke, try to reduce how many cigarettes you smoke or try to quit completely  Do not vape  Other warning signs to watch out for in pregnancy or postpartum: chest pain, obstructed breathing or shortness of breath, seizures, thoughts of hurting yourself or your baby, bleeding, a painful or swollen leg, fever, or headache (see [employer] POST-BIRTH Warning Signs campaign)  If these happen call 911  Itching is also not normal in pregnancy and if you experience this, especially over your hands and feet, potentially worse at night, notify your doctors

## 2022-12-01 ENCOUNTER — ROUTINE PRENATAL (OUTPATIENT)
Dept: PERINATAL CARE | Facility: OTHER | Age: 37
End: 2022-12-01

## 2022-12-01 VITALS
SYSTOLIC BLOOD PRESSURE: 120 MMHG | HEIGHT: 72 IN | BODY MASS INDEX: 25.9 KG/M2 | HEART RATE: 71 BPM | WEIGHT: 191.2 LBS | DIASTOLIC BLOOD PRESSURE: 78 MMHG

## 2022-12-01 DIAGNOSIS — Z3A.22 22 WEEKS GESTATION OF PREGNANCY: ICD-10-CM

## 2022-12-01 DIAGNOSIS — O09.812 PREGNANCY RESULTING FROM IN VITRO FERTILIZATION IN SECOND TRIMESTER: Primary | ICD-10-CM

## 2022-12-01 NOTE — PROGRESS NOTES
[address]: Ms Shayna Khan was seen today at 22w2d for fetal echocardiogram   See ultrasound report under "OB Procedures" tab    Please don't hesitate to contact our office with any concerns or questions   -Marcus Muse MD

## 2022-12-01 NOTE — LETTER
December 1, 2022     [facility], [address] [address],[address]  [facility]    Patient: Murray Whiteside   YOB: 1985   Date of Visit: 12/1/2022       Dear Dr Lizzy Wild: Thank you for referring Candice Cadet to me for evaluation  Below are my notes for this consultation  If you have questions, please do not hesitate to call me  I look forward to following your patient along with you  Sincerely,        Tobin Vieyra MD        CC: No Recipients  Tobin Vieyra MD  12/1/2022  9:16 AM  Sign when Signing Visit  [address]: Ms David Strickland was seen today at 22w2d for fetal echocardiogram   See ultrasound report under "OB Procedures" tab    Please don't hesitate to contact our office with any concerns or questions   -Tobin Vieyra MD

## 2022-12-12 ENCOUNTER — ROUTINE PRENATAL (OUTPATIENT)
Dept: OBGYN CLINIC | Facility: CLINIC | Age: 37
End: 2022-12-12

## 2022-12-12 VITALS
HEIGHT: 72 IN | BODY MASS INDEX: 25.9 KG/M2 | SYSTOLIC BLOOD PRESSURE: 138 MMHG | WEIGHT: 191.2 LBS | DIASTOLIC BLOOD PRESSURE: 72 MMHG

## 2022-12-12 DIAGNOSIS — O09.512 AMA (ADVANCED MATERNAL AGE) PRIMIGRAVIDA 35+, SECOND TRIMESTER: ICD-10-CM

## 2022-12-12 DIAGNOSIS — O26.899 RH NEGATIVE STATE IN ANTEPARTUM PERIOD: ICD-10-CM

## 2022-12-12 DIAGNOSIS — Z67.91 RH NEGATIVE STATE IN ANTEPARTUM PERIOD: ICD-10-CM

## 2022-12-12 DIAGNOSIS — O09.812 PREGNANCY RESULTING FROM IN VITRO FERTILIZATION IN SECOND TRIMESTER: ICD-10-CM

## 2022-12-12 DIAGNOSIS — R03.0 ELEVATED BP WITHOUT DIAGNOSIS OF HYPERTENSION: ICD-10-CM

## 2022-12-12 DIAGNOSIS — O44.02 PLACENTA PREVIA, SECOND TRIMESTER: ICD-10-CM

## 2022-12-12 DIAGNOSIS — Z3A.23 23 WEEKS GESTATION OF PREGNANCY: Primary | ICD-10-CM

## 2022-12-12 LAB
SL AMB  POCT GLUCOSE, UA: NEGATIVE
SL AMB POCT URINE PROTEIN: NEGATIVE

## 2022-12-12 NOTE — ASSESSMENT & PLAN NOTE
/72, repeat 130/72 on 12/12/2022  Patient states she get nervous with blood pressure checks    Will continue to monitor for White coat syndrome vs GHTN

## 2022-12-12 NOTE — PROGRESS NOTES
Routine Prenatal Visit  [address] [facility], [address], [facility], [address] [facility] [address]    Assessment/Plan:  Deyanira Sharma is a 40y o  year old  at 23w6d who presents for routine prenatal visit  1  23 weeks gestation of pregnancy  -     POCT urine dip    2  Placenta previa, second trimester    3  Pregnancy resulting from in vitro fertilization in second trimester    4  AMA (advanced maternal age) primigravida 33+, second trimester    5  Rh negative state in antepartum period    6  Elevated BP without diagnosis of hypertension  Assessment & Plan:  /72, repeat 130/72 on 2022  Patient states she get nervous with blood pressure checks  Will continue to monitor for White coat syndrome vs GHTN      Next OB Visit 4 weeks  Subjective:     CC: Prenatal care    Kerwin Gaines is a 40 y o   female who presents for routine prenatal care at 23w6d  Pregnancy ROS: no leakage of fluid, pelvic pain, or vaginal bleeding  normal fetal movement      The following portions of the patient's history were reviewed and updated as appropriate: allergies, current medications, past family history, past medical history, obstetric history, gynecologic history, past social history, past surgical history and problem list       Objective:  /72 (BP Location: Left arm, Patient Position: Sitting, Cuff Size: Standard)   Ht 6' 1" (1 854 m)   Wt 86 7 kg (191 lb 3 2 oz)   LMP 2022   BMI 25 23 kg/m²   Pregravid Weight/BMI: 78 5 kg (173 lb) (BMI 22 83)  Current Weight: 86 7 kg (191 lb 3 2 oz)   Total Weight Gain: 8 255 kg (18 lb 3 2 oz)   Pre-Katelynn Vitals    Flowsheet Row Most Recent Value   Prenatal Assessment    Fetal Heart Rate 140   Fundal Height (cm) 24 cm   Movement Present   Prenatal Vitals    Blood Pressure 138/72   Weight - Scale 86 7 kg (191 lb 3 2 oz)   Urine Albumin/Glucose    Dilation/Effacement/Station    Vaginal Drainage    Draining Fluid No   Edema    LLE Edema None   RLE Edema None           General: Well appearing, no distress  Abdomen: Soft, gravid, nontender  Extremities: Non tender

## 2022-12-16 ENCOUNTER — TELEPHONE (OUTPATIENT)
Dept: OBGYN CLINIC | Facility: CLINIC | Age: 37
End: 2022-12-16

## 2022-12-16 NOTE — TELEPHONE ENCOUNTER
Rec'd breast pump order request from [facility]  Form completed and faxed with fax confirmation received  Form sent to scan into pt's chart

## 2023-01-11 ENCOUNTER — ROUTINE PRENATAL (OUTPATIENT)
Dept: OBGYN CLINIC | Facility: CLINIC | Age: 38
End: 2023-01-11

## 2023-01-11 VITALS
SYSTOLIC BLOOD PRESSURE: 120 MMHG | BODY MASS INDEX: 37.19 KG/M2 | WEIGHT: 197 LBS | HEIGHT: 61 IN | DIASTOLIC BLOOD PRESSURE: 80 MMHG

## 2023-01-11 DIAGNOSIS — Z3A.28 28 WEEKS GESTATION OF PREGNANCY: ICD-10-CM

## 2023-01-11 DIAGNOSIS — Z67.91 RH NEGATIVE STATE IN ANTEPARTUM PERIOD: ICD-10-CM

## 2023-01-11 DIAGNOSIS — O26.899 RH NEGATIVE STATE IN ANTEPARTUM PERIOD: ICD-10-CM

## 2023-01-11 DIAGNOSIS — Z36.89 ENCOUNTER FOR OTHER SPECIFIED ANTENATAL SCREENING: ICD-10-CM

## 2023-01-11 DIAGNOSIS — O09.813 PREGNANCY RESULTING FROM IN VITRO FERTILIZATION IN THIRD TRIMESTER: Primary | ICD-10-CM

## 2023-01-11 DIAGNOSIS — O09.512 AMA (ADVANCED MATERNAL AGE) PRIMIGRAVIDA 35+, SECOND TRIMESTER: ICD-10-CM

## 2023-01-11 LAB
SL AMB  POCT GLUCOSE, UA: NORMAL
SL AMB POCT URINE PROTEIN: NORMAL

## 2023-01-11 NOTE — PROGRESS NOTES
Routine Prenatal Visit  [address], [address], [facility], [address] [facility]    Assessment/Plan:  Jarred Blackman is a 40y o  year old  at 29w1d who presents for routine prenatal visit  1  Pregnancy resulting from in vitro fertilization in third trimester    2  Rh negative state in antepartum period  Assessment & Plan: Will go for 28 week labs by the end of the week and will call for Rhig appt      3  AMA (advanced maternal age) primigravida 33+, second trimester    4  28 weeks gestation of pregnancy  -     POCT urine dip    5  Encounter for other specified  screening  -     Glucose, 1H PG; Future  -     ABO/Rh; Future  -     Antibody screen; Future  -     CBC; Future  -     RPR, Rfx Qn RPR/Confirm TP; Future  -     Glucose, 1H PG  -     ABO/Rh  -     Antibody screen  -     CBC  -     RPR, Rfx Qn RPR/Confirm TP        Subjective:     CC: Prenatal care    Senait Benitez is a 40 y o   female who presents for routine prenatal care at 28w1d  Pregnancy ROS: no leakage of fluid, pelvic pain, or vaginal bleeding  normal fetal movement      The following portions of the patient's history were reviewed and updated as appropriate: allergies, current medications, past family history, past medical history, obstetric history, gynecologic history, past social history, past surgical history and problem list       Objective:  /80 (BP Location: Left arm, Patient Position: Sitting, Cuff Size: Standard)   Ht 5' 1" (1 549 m)   Wt 89 4 kg (197 lb)   LMP 2022   BMI 37 22 kg/m²   Pregravid Weight/BMI: 78 5 kg (173 lb) (BMI 32 70)  Current Weight: 89 4 kg (197 lb)   Total Weight Gain: 10 9 kg (24 lb)   Pre-Katelynn Vitals    Flowsheet Row Most Recent Value   Prenatal Assessment    Prenatal Vitals    Blood Pressure 120/80   Weight - Scale 89 4 kg (197 lb)   Urine Albumin/Glucose    Dilation/Effacement/Station    Vaginal Drainage    Edema            General: Well appearing, no distress  Respiratory: Unlabored breathing  Cardiovascular: Regular rate  Abdomen: Soft, gravid, nontender  Fundal Height: Appropriate for gestational age  Extremities: Warm and well perfused  Non tender

## 2023-01-13 LAB
EXTERNAL ANTIBODY SCREEN: NORMAL
EXTERNAL HEMATOCRIT: 32.4 %
EXTERNAL HEMOGLOBIN: 10.6 G/DL
EXTERNAL PLATELET COUNT: 228 K/ÂΜL
EXTERNAL RH FACTOR: NEGATIVE
EXTERNAL SYPHILIS RPR SCREEN: NORMAL

## 2023-01-14 PROBLEM — O09.813 PREGNANCY RESULTING FROM IN VITRO FERTILIZATION IN THIRD TRIMESTER: Status: ACTIVE | Noted: 2022-09-19

## 2023-01-14 LAB
ABO GROUP BLD: NORMAL
BLD GP AB SCN SERPL QL: NEGATIVE
ERYTHROCYTE [DISTWIDTH] IN BLOOD BY AUTOMATED COUNT: 11.8 % (ref 11.7–15.4)
GLUCOSE 1H P 50 G GLC PO SERPL-MCNC: 65 MG/DL (ref 70–139)
HCT VFR BLD AUTO: 30.6 % (ref 34–46.6)
HGB BLD-MCNC: 10.6 G/DL (ref 11.1–15.9)
MCH RBC QN AUTO: 32.4 PG (ref 26.6–33)
MCHC RBC AUTO-ENTMCNC: 34.6 G/DL (ref 31.5–35.7)
MCV RBC AUTO: 94 FL (ref 79–97)
PLATELET # BLD AUTO: 228 X10E3/UL (ref 150–450)
RBC # BLD AUTO: 3.27 X10E6/UL (ref 3.77–5.28)
RH BLD: NEGATIVE
RPR SER QL: NON REACTIVE
WBC # BLD AUTO: 9.6 X10E3/UL (ref 3.4–10.8)

## 2023-01-18 ENCOUNTER — CLINICAL SUPPORT (OUTPATIENT)
Dept: OBGYN CLINIC | Facility: CLINIC | Age: 38
End: 2023-01-18

## 2023-01-18 DIAGNOSIS — Z67.91 RH NEGATIVE STATE IN ANTEPARTUM PERIOD: Primary | ICD-10-CM

## 2023-01-18 DIAGNOSIS — O26.899 RH NEGATIVE STATE IN ANTEPARTUM PERIOD: Primary | ICD-10-CM

## 2023-01-18 DIAGNOSIS — Z3A.28 28 WEEKS GESTATION OF PREGNANCY: ICD-10-CM

## 2023-01-18 NOTE — PROGRESS NOTES
Patient presents for Rhogam injection at 29w,d  [facility] test results dated 1/13/2023 in chart identifying as O negative  Rhogam administered left deltoid  Patient tolerated with no noted side effects   Paperwork forwarded to Patrick and copy faxed to [facility] [employer]

## 2023-01-20 ENCOUNTER — ULTRASOUND (OUTPATIENT)
Dept: PERINATAL CARE | Facility: OTHER | Age: 38
End: 2023-01-20

## 2023-01-20 VITALS
HEART RATE: 98 BPM | DIASTOLIC BLOOD PRESSURE: 76 MMHG | WEIGHT: 198.6 LBS | SYSTOLIC BLOOD PRESSURE: 128 MMHG | BODY MASS INDEX: 26.9 KG/M2 | HEIGHT: 72 IN

## 2023-01-20 DIAGNOSIS — O09.813 PREGNANCY RESULTING FROM IN VITRO FERTILIZATION IN THIRD TRIMESTER: Primary | ICD-10-CM

## 2023-01-20 DIAGNOSIS — Z3A.29 29 WEEKS GESTATION OF PREGNANCY: ICD-10-CM

## 2023-01-20 DIAGNOSIS — O44.02 PLACENTA PREVIA, SECOND TRIMESTER: ICD-10-CM

## 2023-01-20 NOTE — PROGRESS NOTES
Ultrasound Probe Disinfection    A transvaginal ultrasound was performed  Prior to use, disinfection was performed with High Level Disinfection Process (Trophon)  Probe serial number U3: [id number] was used        Malgorzata Hall  01/20/23  9:38 AM

## 2023-01-20 NOTE — LETTER
January 20, 2023     [address]  [address] [address]  [place]    Patient: Brenda Pratt   YOB: 1985   Date of Visit: 1/20/2023       Dear Dr Elicia Jimenez: Thank you for referring Allan Coleman to me for evaluation  Below are my notes for this consultation  If you have questions, please do not hesitate to call me  I look forward to following your patient along with you           Sincerely,        Marietta Lorenzo MD        CC: No Recipients  Marietta Lorenzo MD  1/19/2023  5:09 PM  Sign when Signing Visit  Please refer to the [facility] ultrasound report in Ob Procedures for additional information regarding today's visit

## 2023-01-20 NOTE — PATIENT INSTRUCTIONS
Kick Counts in Pregnancy   WHAT YOU NEED TO KNOW:   Kick counts measure how much your baby is moving in your womb  A kick from your baby can be felt as a twist, turn, swish, roll, or jab  It is common to feel your baby kicking at 26 to 28 weeks of pregnancy  You may feel your baby kick as early as 20 weeks of pregnancy  You may want to start counting at 28 weeks  DISCHARGE INSTRUCTIONS:   Contact your doctor immediately if:   You feel a change in the number of kicks or movements of your baby  You feel fewer than 10 kicks within 2 hours  You have questions or concerns about your baby's movements  Why measure kick counts:  Your baby's movement may provide information about your baby's health  He or she may move less, or not at all, if there are problems  Your baby may move less if he or she is not getting enough oxygen or nutrition from the placenta  Do not smoke while you are pregnant  Smoking decreases the amount of oxygen that gets to your baby  Talk to your healthcare provider if you need help to quit smoking  Tell your healthcare provider as soon as you feel a change in your baby's movements  When to measure kick counts:   Measure kick counts at the same time every day  Measure kick counts when your baby is awake and most active  Your baby may be most active in the evening  How to measure kick counts:  Check that your baby is awake before you measure kick counts  You can wake up your baby by lightly pushing on your belly, walking, or drinking something cold  Your healthcare provider may tell you different ways to measure kick counts  You may be told to do the following:  Use a chart or clock to keep track of the time you start and finish counting  Sit in a chair or lie on your left side  Place your hands on the largest part of your belly  Count until you reach 10 kicks  Write down how much time it takes to count 10 kicks  It may take 30 minutes to 2 hours to count 10 kicks  It should not take more than 2 hours to count 10 kicks  Follow up with your doctor as directed:  Write down your questions so you remember to ask them during your visits  © Copyright [employer] 2022 Information is for End User's use only and may not be sold, redistributed or otherwise used for commercial purposes  All illustrations and images included in CareNotes® are the copyrighted property of A D A M , Inc  or [facility] Kristi Clark   The above information is an  only  It is not intended as medical advice for individual conditions or treatments  Talk to your doctor, nurse or pharmacist before following any medical regimen to see if it is safe and effective for you

## 2023-01-25 ENCOUNTER — TELEPHONE (OUTPATIENT)
Dept: OBGYN CLINIC | Facility: CLINIC | Age: 38
End: 2023-01-25

## 2023-01-25 ENCOUNTER — ROUTINE PRENATAL (OUTPATIENT)
Dept: OBGYN CLINIC | Facility: CLINIC | Age: 38
End: 2023-01-25

## 2023-01-25 VITALS
HEIGHT: 61 IN | BODY MASS INDEX: 38.14 KG/M2 | SYSTOLIC BLOOD PRESSURE: 120 MMHG | WEIGHT: 202 LBS | DIASTOLIC BLOOD PRESSURE: 80 MMHG

## 2023-01-25 DIAGNOSIS — O31.21X1 TWIN PREGNANCY WITH SINGLE INTRAUTERINE DEATH, FIRST TRIMESTER, FETUS 1: ICD-10-CM

## 2023-01-25 DIAGNOSIS — Z67.91 RH NEGATIVE STATE IN ANTEPARTUM PERIOD: ICD-10-CM

## 2023-01-25 DIAGNOSIS — O26.899 RH NEGATIVE STATE IN ANTEPARTUM PERIOD: ICD-10-CM

## 2023-01-25 DIAGNOSIS — O44.03 PLACENTA PREVIA, THIRD TRIMESTER: Primary | ICD-10-CM

## 2023-01-25 DIAGNOSIS — O99.013 ANEMIA DURING PREGNANCY IN THIRD TRIMESTER: ICD-10-CM

## 2023-01-25 DIAGNOSIS — Z3A.30 30 WEEKS GESTATION OF PREGNANCY: ICD-10-CM

## 2023-01-25 DIAGNOSIS — O09.813 PREGNANCY RESULTING FROM IN VITRO FERTILIZATION IN THIRD TRIMESTER: ICD-10-CM

## 2023-01-25 DIAGNOSIS — R03.0 ELEVATED BP WITHOUT DIAGNOSIS OF HYPERTENSION: ICD-10-CM

## 2023-01-25 DIAGNOSIS — O09.512 AMA (ADVANCED MATERNAL AGE) PRIMIGRAVIDA 35+, SECOND TRIMESTER: ICD-10-CM

## 2023-01-25 LAB
SL AMB  POCT GLUCOSE, UA: NORMAL
SL AMB POCT URINE PROTEIN: NORMAL

## 2023-01-25 NOTE — ASSESSMENT & PLAN NOTE
- PTL/PPROM/Bleeding precautions given  Kick counts reviewed  - Third trimester labs reviewed  Rhogam previously given  - Recommendation for administration of TDaP was reviewed  Patient will return for vaccination on a day that she is not working    - Problem list updated, results console reviewed and updated with pertinent prenatal labs    - PMH, PSH, medications reviewed and updated as needed  - Return to office in 2 wk(s) for routine prenatal care

## 2023-01-25 NOTE — ASSESSMENT & PLAN NOTE
- Discussed delivery at 36w0d to 37w6d via primary  section, presuming previa persists at 36 weeks  Patient desires to schedule for 37 weeks to reduce risks of prematurity  Message sent to office surgical scheduler to schedule  - Recommend continued pelvic rest  Bleeding precautions reviewed

## 2023-01-25 NOTE — TELEPHONE ENCOUNTER
----- Message from Dharmesh Talamantes MD sent at 1/25/2023 10:46 AM EST -----  Regarding: Placenta previa - C/S  Please schedule patient for primary c/s for placenta previa at 37 weeks (3/14 or after)   Thanks    Krzysztof Martínez MD  1/25/2023 10:46 AM 20.5

## 2023-01-25 NOTE — PROGRESS NOTES
Routine Prenatal Visit  [address], [address], [facility], [address] [facility]    Assessment/Plan:  Amada Benito is a 40y o  year old  at 30w1d who presents for routine prenatal visit  1  Placenta previa, third trimester  Assessment & Plan:  - Discussed delivery at 36w0d to 37w6d via primary  section, presuming previa persists at 36 weeks  Patient desires to schedule for 37 weeks to reduce risks of prematurity  Message sent to office surgical scheduler to schedule  - Recommend continued pelvic rest  Bleeding precautions reviewed  2  Anemia during pregnancy in third trimester  Assessment & Plan:  - Recommend initiation of oral iron supplement once daily  3  Pregnancy resulting from in vitro fertilization in third trimester  Assessment & Plan:  - Continue  mg PO daily until 36 weeks  - Plan for initiation of weekly  testing at 36 weeks  4  AMA (advanced maternal age) primigravida 33+, second trimester    5  Twin pregnancy with single intrauterine death, first trimester, fetus 1    10  Elevated BP without diagnosis of hypertension    7  Rh negative state in antepartum period  Assessment & Plan:  - Rhogam administered 2023       8  30 weeks gestation of pregnancy  Assessment & Plan:  - PTL/PPROM/Bleeding precautions given  Kick counts reviewed  - Third trimester labs reviewed  Rhogam previously given  - Recommendation for administration of TDaP was reviewed  Patient will return for vaccination on a day that she is not working    - Problem list updated, results console reviewed and updated with pertinent prenatal labs  - PMH, PSH, medications reviewed and updated as needed  - Return to office in 2 wk(s) for routine prenatal care      Orders:  -     POCT urine dip        Subjective:   Messi Rai is a 40 y o  [place] Jay who presents for routine prenatal care at 30w1d    Complaints today: No  LOF: No; VB: No; Contractions: No; FM: Present and normal    Objective:  /80 (BP Location: Left arm, Patient Position: Sitting, Cuff Size: Standard)   Ht 5' 1" (1 549 m)   Wt 91 6 kg (202 lb)   LMP 2022   BMI 38 17 kg/m²     General: Well appearing, no distress  Respiratory: Unlabored breathing  Cardiovascular: Regular rate  Abdomen: Soft, gravid, nontender  Extremities: Warm and well perfused  Non tender      Pregravid Weight/BMI: 78 5 kg (173 lb) (BMI 32 70)  Current Weight: 91 6 kg (202 lb)   Total Weight Gain: 13 2 kg (29 lb)     Pre- Vitals    Flowsheet Row Most Recent Value   Prenatal Assessment    Fetal Heart Rate 140   Fundal Height (cm) 30 cm   Movement Present   Prenatal Vitals    Blood Pressure 120/80   Weight - Scale 91 6 kg (202 lb)   Urine Albumin/Glucose    Dilation/Effacement/Station    Vaginal Drainage    Draining Fluid No   Edema    LLE Edema None   RLE Edema None   Facial Edema None           Domenico Pearson MD  2023 10:56 AM

## 2023-02-07 PROBLEM — Z3A.32 32 WEEKS GESTATION OF PREGNANCY: Status: ACTIVE | Noted: 2022-11-15

## 2023-02-08 ENCOUNTER — ROUTINE PRENATAL (OUTPATIENT)
Dept: OBGYN CLINIC | Facility: CLINIC | Age: 38
End: 2023-02-08

## 2023-02-08 VITALS — SYSTOLIC BLOOD PRESSURE: 126 MMHG | WEIGHT: 203 LBS | DIASTOLIC BLOOD PRESSURE: 80 MMHG | BODY MASS INDEX: 38.36 KG/M2

## 2023-02-08 DIAGNOSIS — O09.523 AMA (ADVANCED MATERNAL AGE) MULTIGRAVIDA 35+, THIRD TRIMESTER: ICD-10-CM

## 2023-02-08 DIAGNOSIS — O26.899 RH NEGATIVE STATE IN ANTEPARTUM PERIOD: ICD-10-CM

## 2023-02-08 DIAGNOSIS — O09.813 PREGNANCY RESULTING FROM IN VITRO FERTILIZATION IN THIRD TRIMESTER: ICD-10-CM

## 2023-02-08 DIAGNOSIS — Z3A.32 32 WEEKS GESTATION OF PREGNANCY: ICD-10-CM

## 2023-02-08 DIAGNOSIS — Z67.91 RH NEGATIVE STATE IN ANTEPARTUM PERIOD: ICD-10-CM

## 2023-02-08 DIAGNOSIS — O44.03 PLACENTA PREVIA, THIRD TRIMESTER: Primary | ICD-10-CM

## 2023-02-08 DIAGNOSIS — Z23 NEED FOR TDAP VACCINATION: ICD-10-CM

## 2023-02-08 LAB
SL AMB  POCT GLUCOSE, UA: NORMAL
SL AMB POCT URINE PROTEIN: NORMAL

## 2023-02-08 NOTE — PROGRESS NOTES
Routine Prenatal Visit  [address] [facility], [address], [facility], [address]    Assessment/Plan:  Gerber Fallon is a 40y o  year old  at [address] [place] [address] who presents for routine prenatal visit  1  Placenta previa, third trimester  Assessment & Plan:  Placenta previa - no bleeding  F/u u/s 36 weeks to assess placenta - if persists has  jewels'd at 37 weeks  2  AMA (advanced maternal age) multigravida 33+, third trimester    3  Rh negative state in antepartum period  Assessment & Plan:  Rhogam given 2023      4  Pregnancy resulting from in vitro fertilization in third trimester  Assessment & Plan:  3rd trimester growth u/s normal       5  Need for Tdap vaccination  -     Tdap Vaccine greater than or equal to 8yo    6  32 weeks gestation of pregnancy  -     POCT urine dip      Next OB Visit 2 weeks  Subjective:     CC: Prenatal care    Kamila Mojica is a 40 y o   female who presents for routine prenatal care at [address] [place] [address]  Pregnancy ROS: no leakage of fluid, pelvic pain, or vaginal bleeding  normal fetal movement      The following portions of the patient's history were reviewed and updated as appropriate: allergies, current medications, past family history, past medical history, obstetric history, gynecologic history, past social history, past surgical history and problem list       Objective:  /80   Wt 92 1 kg (203 lb)   LMP 2022   BMI 38 36 kg/m²   Pregravid Weight/BMI: 78 5 kg (173 lb) (BMI 32 70)  Current Weight: 92 1 kg (203 lb)   Total Weight Gain: 13 6 kg (30 lb)   Pre-Katelynn Vitals    Flowsheet Row Most Recent Value   Prenatal Assessment    Fetal Heart Rate 155   Fundal Height (cm) 32 cm   Movement Present   Prenatal Vitals    Blood Pressure 126/80   Weight - Scale 92 1 kg (203 lb)   Urine Albumin/Glucose    Dilation/Effacement/Station    Vaginal Drainage    Draining Fluid No   Edema    LLE Edema None   RLE Edema None           General: Well appearing, no distress  Abdomen: Soft, gravid, nontender  Extremities: Non tender

## 2023-02-08 NOTE — ASSESSMENT & PLAN NOTE
Placenta previa - no bleeding  F/u u/s 36 weeks to assess placenta - if persists has  jewels'd at 37 weeks

## 2023-02-21 ENCOUNTER — ROUTINE PRENATAL (OUTPATIENT)
Dept: OBGYN CLINIC | Facility: CLINIC | Age: 38
End: 2023-02-21

## 2023-02-21 VITALS
HEIGHT: 72 IN | BODY MASS INDEX: 27.5 KG/M2 | DIASTOLIC BLOOD PRESSURE: 80 MMHG | SYSTOLIC BLOOD PRESSURE: 120 MMHG | WEIGHT: 203 LBS

## 2023-02-21 DIAGNOSIS — O26.899 RH NEGATIVE STATE IN ANTEPARTUM PERIOD: ICD-10-CM

## 2023-02-21 DIAGNOSIS — O44.03 PLACENTA PREVIA, THIRD TRIMESTER: Primary | ICD-10-CM

## 2023-02-21 DIAGNOSIS — O09.523 AMA (ADVANCED MATERNAL AGE) MULTIGRAVIDA 35+, THIRD TRIMESTER: ICD-10-CM

## 2023-02-21 DIAGNOSIS — O99.013 ANEMIA DURING PREGNANCY IN THIRD TRIMESTER: ICD-10-CM

## 2023-02-21 DIAGNOSIS — O09.813 PREGNANCY RESULTING FROM IN VITRO FERTILIZATION IN THIRD TRIMESTER: ICD-10-CM

## 2023-02-21 DIAGNOSIS — Z3A.34 34 WEEKS GESTATION OF PREGNANCY: ICD-10-CM

## 2023-02-21 DIAGNOSIS — Z67.91 RH NEGATIVE STATE IN ANTEPARTUM PERIOD: ICD-10-CM

## 2023-02-21 PROBLEM — Z3A.32 32 WEEKS GESTATION OF PREGNANCY: Status: RESOLVED | Noted: 2022-11-15 | Resolved: 2023-02-21

## 2023-02-21 LAB
SL AMB  POCT GLUCOSE, UA: NORMAL
SL AMB POCT URINE PROTEIN: NORMAL

## 2023-02-21 NOTE — ASSESSMENT & PLAN NOTE
Briefly reviewed scheduled C/S  Repeat U/S is scheduled but unlikely to migrate, based upon previous measurements

## 2023-02-21 NOTE — PROGRESS NOTES
Routine Prenatal Visit  [address] [facility], [address], [facility], [address]    Assessment/Plan:  Anu Steinberg is a 40y o  year old  at 34w0d who presents for routine prenatal visit  1  Placenta previa, third trimester  Assessment & Plan:  Briefly reviewed scheduled C/S  Repeat U/S is scheduled but unlikely to migrate, based upon previous measurements  2  Pregnancy resulting from in vitro fertilization in third trimester    3  Anemia during pregnancy in third trimester    4  Rh negative state in antepartum period    5  AMA (advanced maternal age) multigravida 33+, third trimester    6  34 weeks gestation of pregnancy  Assessment & Plan:  GBS next visit  Orders:  -     POCT urine dip          Subjective:     CC: Prenatal care    Vanda Barrios is a 40 y o  [address] [facility] female who presents for routine prenatal care at 34w0d  Pregnancy ROS: no leakage of fluid, pelvic pain, or vaginal bleeding  normal fetal movement      The following portions of the patient's history were reviewed and updated as appropriate: allergies, current medications, past family history, past medical history, obstetric history, gynecologic history, past social history, past surgical history and problem list       Objective:  /80 (BP Location: Left arm, Patient Position: Sitting, Cuff Size: Standard)   Ht 6' 1" (1 854 m)   Wt 92 1 kg (203 lb)   LMP 2022   BMI 26 78 kg/m²   Pregravid Weight/BMI: 78 5 kg (173 lb) (BMI 22 83)  Current Weight: 92 1 kg (203 lb)   Total Weight Gain: 13 6 kg (30 lb)   Pre-Katelynn Vitals    Flowsheet Row Most Recent Value   Prenatal Assessment    Fetal Heart Rate 140   Fundal Height (cm) 34 cm   Movement Present   Prenatal Vitals    Blood Pressure 120/80   Weight - Scale 92 1 kg (203 lb)   Urine Albumin/Glucose    Dilation/Effacement/Station    Vaginal Drainage    Edema            General: Well appearing, no distress  Respiratory: Unlabored breathing  Cardiovascular: Regular rate  Abdomen: Soft, gravid, nontender  Fundal Height: Appropriate for gestational age  Extremities: Warm and well perfused  Non tender

## 2023-03-02 NOTE — PATIENT INSTRUCTIONS
Thank you for choosing us for your  care today  If you have any questions about your ultrasound or care, please do not hesitate to contact us or your primary obstetrician  Some general instructions for your pregnancy are:    Exercise: Aim for 22 minutes per day (150 minutes per week) of regular exercise  Walking is great! Nutrition: Choose healthy sources of calcium, iron, and protein  Learn about Preeclampsia: preeclampsia is a common, serious high blood pressure complication in pregnancy  A blood pressure of 478ERYM (systolic or top number) or 15CXKM (diastolic or bottom number) is not normal and needs evaluation by your doctor  Aspirin is sometimes prescribed in early pregnancy to prevent preeclampsia in women with risk factors - ask your obstetrician if you should be on this medication  For more resources, visit:  Map[employer] fi  If you smoke, try to reduce how many cigarettes you smoke or try to quit completely  Do not vape  Other warning signs to watch out for in pregnancy or postpartum: chest pain, obstructed breathing or shortness of breath, seizures, thoughts of hurting yourself or your baby, bleeding, a painful or swollen leg, fever, or headache (see [facility]WH[facility] POST-BIRTH Warning Signs campaign)  If these happen call 911  Itching is also not normal in pregnancy and if you experience this, especially over your hands and feet, potentially worse at night, notify your doctors

## 2023-03-03 ENCOUNTER — ULTRASOUND (OUTPATIENT)
Dept: PERINATAL CARE | Facility: OTHER | Age: 38
End: 2023-03-03

## 2023-03-03 VITALS
DIASTOLIC BLOOD PRESSURE: 74 MMHG | BODY MASS INDEX: 28.58 KG/M2 | WEIGHT: 211 LBS | SYSTOLIC BLOOD PRESSURE: 128 MMHG | HEIGHT: 72 IN | HEART RATE: 86 BPM

## 2023-03-03 DIAGNOSIS — Z36.89 ENCOUNTER FOR ULTRASOUND TO ASSESS FETAL GROWTH: ICD-10-CM

## 2023-03-03 DIAGNOSIS — O44.03 PLACENTA PREVIA, THIRD TRIMESTER: ICD-10-CM

## 2023-03-03 DIAGNOSIS — O09.813 PREGNANCY RESULTING FROM IN VITRO FERTILIZATION IN THIRD TRIMESTER: Primary | ICD-10-CM

## 2023-03-03 DIAGNOSIS — O09.523 AMA (ADVANCED MATERNAL AGE) MULTIGRAVIDA 35+, THIRD TRIMESTER: ICD-10-CM

## 2023-03-03 DIAGNOSIS — Z3A.35 35 WEEKS GESTATION OF PREGNANCY: ICD-10-CM

## 2023-03-03 DIAGNOSIS — O31.10X0 VANISHING TWIN SYNDROME: ICD-10-CM

## 2023-03-03 NOTE — LETTER
March 3, 2023     Basilio Brennan    Patient: Miesha Tsai   YOB: 1985   Date of Visit: 3/3/2023       Dear Dr Opal Garza: Thank you for referring Sita Wynne to me for evaluation  Below are my notes for this consultation  If you have questions, please do not hesitate to call me  I look forward to following your patient along with you  Sincerely,        Liam De León MD        CC: No Recipients  Liam De León MD  3/3/2023 11:27 AM  Sign when Signing Visit  Sita Wynne has no complaints today  She reports regular fetal movements and does not report any problems  She is here today at 35w3d for evaluation of fetal weight, amniotic fluid and placenta location as well as NST  Her 1 hour glucola screen on  was 65  Problem list:  1  Advanced maternal age  3  IFV pregnancy  3  Placenta previa      Ultrasound findings:  A viable montejo intrauterine pregnancy is seen  Estimated fetal weight and amniotic fluid are within normal limits for gestational age  Growth is at the 54th percentile  No fetal anomalies are visualized on limited survey  Placenta previa is still present  The cervix is normal in appearance by transvaginal sonography   The cervical length is normal  Cervical debris is not present   Cervical funneling is not present   The most inferior posterior placental edge extends past the level of the internal os of the cervix  Pregnancy ultrasound has limitations and is unable to detect all forms of fetal congenital abnormalities  The inaccuracy in the EFW can be off by 1 lb either way in the third trimester  Specific counseling was provided on the following problems:  1  We discussed her placenta previa  She denies vaginal bleeding or uterine contractions  Bleeding precautions were reviewed  She is scheduled for a primary  section on 3/14/2023 at 37w0d        Follow up recommended:   1  Continue weekly NST and LISANDRO's due to IVF pregnancy (plans at [facility] office)  No further ultrasounds are recommended at this time  Fetal kick counting was recommended  2  Stop ASA at 36 weeks      The total time dedicated to this patient encounter was 20 minutes (5 preparation, 10 face-to-face, and 5  documenting)  Split-shared decision-making between [employer] and myself was utilized, with the majority of the time spent by Pearl Modest  Procedures that were completed today were charged separately  I reviewed her ultrasound pictures and recommended the medical decision making transcribed in the care of this patient          Renan Gonzalez MD

## 2023-03-03 NOTE — PROGRESS NOTES
Non-Stress Testing:    Non-Stress test, equipment, procedure, and expected outcomes reviewed  Reviewed fetal kick counts and when to call OB  Jewel Ma Verified patient understanding of fetal kick counts with teach back method  Patient reports feeling daily fetal movements  Patient has no questions or concerns

## 2023-03-03 NOTE — PROGRESS NOTES
Ultrasound Probe Disinfection    A transvaginal ultrasound was performed  Prior to use, disinfection was performed with High Level Disinfection Process (Trophon)  Probe serial number U3: [id number] was used        Bailee Fernando  03/03/23  8:59 AM

## 2023-03-03 NOTE — PROGRESS NOTES
Servando Mcqueen has no complaints today  She reports regular fetal movements and does not report any problems  She is here today at 35w3d for evaluation of fetal weight, amniotic fluid and placenta location as well as NST  Her 1 hour glucola screen on  was 65  Problem list:  1  Advanced maternal age  3  IFV pregnancy  3  Placenta previa      Ultrasound findings:  A viable montejo intrauterine pregnancy is seen  Estimated fetal weight and amniotic fluid are within normal limits for gestational age  Growth is at the 54th percentile  No fetal anomalies are visualized on limited survey  Placenta previa is still present  The cervix is normal in appearance by transvaginal sonography   The cervical length is normal  Cervical debris is not present   Cervical funneling is not present   The most inferior posterior placental edge extends past the level of the internal os of the cervix  Pregnancy ultrasound has limitations and is unable to detect all forms of fetal congenital abnormalities  The inaccuracy in the EFW can be off by 1 lb either way in the third trimester  Specific counseling was provided on the following problems:  1  We discussed her placenta previa  She denies vaginal bleeding or uterine contractions  Bleeding precautions were reviewed  She is scheduled for a primary  section on 3/14/2023 at 37w0d  Follow up recommended:   1  Continue weekly NST and LISANDRO's due to IVF pregnancy (plans at [facility] office)  No further ultrasounds are recommended at this time  Fetal kick counting was recommended  2  Stop ASA at 36 weeks      The total time dedicated to this patient encounter was 20 minutes (5 preparation, 10 face-to-face, and 5  documenting)  Split-shared decision-making between [employer] and myself was utilized, with the majority of the time spent by Kenia Patrick  Procedures that were completed today were charged separately       I reviewed her ultrasound pictures and recommended the medical decision making transcribed in the care of this patient          Prosper Topete MD

## 2023-03-03 NOTE — LETTER
NST sleeve cover sheet    Patient name: Orlinda Cabot  : 1985  MRN: [id number]    TIFFANY: Estimated Date of Delivery: 23    Obstetrician: ____________[place]___________    Reason(s) for testing:  _______________IVF, AMA, Placenta previa______________      Testing frequency:    ___ 2x/wk  ___ 1x/wk  ___ Dopplers  ___ BPP?       Last growth scan: __________________________________________

## 2023-03-06 ENCOUNTER — TELEPHONE (OUTPATIENT)
Dept: OBGYN CLINIC | Facility: CLINIC | Age: 38
End: 2023-03-06

## 2023-03-06 NOTE — TELEPHONE ENCOUNTER
OK to stop aspirin now  I do recommend that she have the NST this week  We have not been doing these in the office  She can be sent from the office for her NST after the appointment if that works for her   Otherwise it will need to be scheduled

## 2023-03-06 NOTE — TELEPHONE ENCOUNTER
Patient left message asking if it is safe to now come off the baby aspirin at 36 wks gestation  She also asked if she could have another stress test done during her upcoming appt  With Dr Dominga Preciado this Wednesday  Pt  Wanted to ask Dr Dominga Preciado if she needs the last NST done this week before her scheduled Csection next Tuesday 3/13/23

## 2023-03-07 ENCOUNTER — TELEPHONE (OUTPATIENT)
Dept: OBGYN CLINIC | Facility: CLINIC | Age: 38
End: 2023-03-07

## 2023-03-07 NOTE — TELEPHONE ENCOUNTER
Attempt to call, left a detailed voicemail message of Dr Gurvinder Mcmahon recommendations  Any questions to call back

## 2023-03-07 NOTE — TELEPHONE ENCOUNTER
Erika potts askingif she needs a second NST  Had NST done last Friday   Return call, l/m on v/m at this point needs to schedule NST for this week per v/m left previously

## 2023-03-08 ENCOUNTER — ROUTINE PRENATAL (OUTPATIENT)
Dept: OBGYN CLINIC | Facility: CLINIC | Age: 38
End: 2023-03-08

## 2023-03-08 VITALS — DIASTOLIC BLOOD PRESSURE: 78 MMHG | BODY MASS INDEX: 27.18 KG/M2 | SYSTOLIC BLOOD PRESSURE: 124 MMHG | WEIGHT: 206 LBS

## 2023-03-08 DIAGNOSIS — Z36.89 ENCOUNTER FOR OTHER SPECIFIED ANTENATAL SCREENING: ICD-10-CM

## 2023-03-08 DIAGNOSIS — Z3A.36 36 WEEKS GESTATION OF PREGNANCY: ICD-10-CM

## 2023-03-08 DIAGNOSIS — O26.899 RH NEGATIVE STATE IN ANTEPARTUM PERIOD: ICD-10-CM

## 2023-03-08 DIAGNOSIS — Z67.91 RH NEGATIVE STATE IN ANTEPARTUM PERIOD: ICD-10-CM

## 2023-03-08 DIAGNOSIS — O44.03 PLACENTA PREVIA, THIRD TRIMESTER: Primary | ICD-10-CM

## 2023-03-08 LAB
SL AMB  POCT GLUCOSE, UA: NEGATIVE
SL AMB POCT URINE PROTEIN: NEGATIVE

## 2023-03-10 ENCOUNTER — ROUTINE PRENATAL (OUTPATIENT)
Dept: PERINATAL CARE | Facility: OTHER | Age: 38
End: 2023-03-10

## 2023-03-10 VITALS
DIASTOLIC BLOOD PRESSURE: 64 MMHG | HEART RATE: 78 BPM | WEIGHT: 211.4 LBS | HEIGHT: 72 IN | BODY MASS INDEX: 28.63 KG/M2 | SYSTOLIC BLOOD PRESSURE: 116 MMHG

## 2023-03-10 DIAGNOSIS — Z3A.36 36 WEEKS GESTATION OF PREGNANCY: ICD-10-CM

## 2023-03-10 DIAGNOSIS — O09.523 AMA (ADVANCED MATERNAL AGE) MULTIGRAVIDA 35+, THIRD TRIMESTER: ICD-10-CM

## 2023-03-10 DIAGNOSIS — O09.813 PREGNANCY RESULTING FROM IN VITRO FERTILIZATION IN THIRD TRIMESTER: Primary | ICD-10-CM

## 2023-03-10 NOTE — PROGRESS NOTES
[id number] [facility]: Ms Bud Chou was seen today for NST (found under the pregnancy episode) which I reviewed the RN assessment and agree  Please don't hesitate to contact our office with any concerns or questions    -Hortencia Opitz, MD

## 2023-03-11 LAB — GP B STREP DNA SPEC QL NAA+PROBE: NEGATIVE

## 2023-03-12 NOTE — ASSESSMENT & PLAN NOTE
Reviewed upcoming surgery with patient and   Explained risk of increased blood loss due to previa   Will type and cross for 2U PRBC  GBS/surgical consent and prewash given

## 2023-03-12 NOTE — PROGRESS NOTES
Routine Prenatal Visit  [address] [facility], [address], [facility], [address]    Assessment/Plan:  Geovanni Lee is a 40y o  year old  at 36w1d who presents for routine prenatal visit  1  Placenta previa, third trimester  Assessment & Plan:  Reviewed upcoming surgery with patient and   Explained risk of increased blood loss due to previa  Will type and cross for 2U PRBC  GBS/surgical consent and prewash given      2  Rh negative state in antepartum period    3  36 weeks gestation of pregnancy  -     POCT urine dip    4  Encounter for other specified  screening  -     Strep B DNA probe, amplification        Subjective:     CC: Prenatal care    Sandy Miramontes is a 40 y o   female who presents for routine prenatal care at 36w1d  Pregnancy ROS: no leakage of fluid, pelvic pain, or vaginal bleeding  normal fetal movement  The following portions of the patient's history were reviewed and updated as appropriate: allergies, current medications, past family history, past medical history, obstetric history, gynecologic history, past social history, past surgical history and problem list       Objective:  /78   Wt 93 4 kg (206 lb)   LMP 2022   BMI 27 18 kg/m²   Pregravid Weight/BMI: 78 5 kg (173 lb) (BMI 22 83)  Current Weight: 93 4 kg (206 lb)   Total Weight Gain: 15 kg (33 lb)   Pre-Katelynn Vitals    Flowsheet Row Most Recent Value   Prenatal Assessment    Fetal Heart Rate 140   Fundal Height (cm) 36 cm   Movement Present   Presentation Vertex   Prenatal Vitals    Blood Pressure 124/78   Weight - Scale 93 4 kg (206 lb)   Urine Albumin/Glucose    Dilation/Effacement/Station    Vaginal Drainage    Edema            General: Well appearing, no distress  Respiratory: Unlabored breathing  Cardiovascular: Regular rate  Abdomen: Soft, gravid, nontender  Fundal Height: Appropriate for gestational age  Extremities: Warm and well perfused  Non tender

## 2023-03-13 ENCOUNTER — APPOINTMENT (OUTPATIENT)
Dept: LAB | Facility: HOSPITAL | Age: 38
End: 2023-03-13
Attending: OBSTETRICS & GYNECOLOGY

## 2023-03-13 ENCOUNTER — LAB REQUISITION (OUTPATIENT)
Dept: LAB | Facility: HOSPITAL | Age: 38
End: 2023-03-13

## 2023-03-13 ENCOUNTER — ANESTHESIA EVENT (INPATIENT)
Dept: LABOR AND DELIVERY | Facility: HOSPITAL | Age: 38
End: 2023-03-13

## 2023-03-13 DIAGNOSIS — Z36.89 ENCOUNTER FOR OTHER SPECIFIED ANTENATAL SCREENING: ICD-10-CM

## 2023-03-13 DIAGNOSIS — O26.899 RH NEGATIVE STATE IN ANTEPARTUM PERIOD: ICD-10-CM

## 2023-03-13 DIAGNOSIS — O44.03 PLACENTA PREVIA, THIRD TRIMESTER: ICD-10-CM

## 2023-03-13 DIAGNOSIS — Z01.818 ENCOUNTER FOR PREADMISSION TESTING: ICD-10-CM

## 2023-03-13 DIAGNOSIS — Z01.818 ENCOUNTER FOR OTHER PREPROCEDURAL EXAMINATION: ICD-10-CM

## 2023-03-13 DIAGNOSIS — Z67.91 RH NEGATIVE STATE IN ANTEPARTUM PERIOD: ICD-10-CM

## 2023-03-13 PROBLEM — Z3A.37 37 WEEKS GESTATION OF PREGNANCY: Status: ACTIVE | Noted: 2022-11-15

## 2023-03-13 LAB
ABO GROUP BLD: NORMAL
BASOPHILS # BLD AUTO: 0.03 THOUSANDS/ÂΜL (ref 0–0.1)
BASOPHILS NFR BLD AUTO: 0 % (ref 0–1)
BLD GP AB SCN SERPL QL: POSITIVE
BLOOD GROUP ANTIBODIES SERPL: NORMAL
EOSINOPHIL # BLD AUTO: 0.16 THOUSAND/ÂΜL (ref 0–0.61)
EOSINOPHIL NFR BLD AUTO: 2 % (ref 0–6)
ERYTHROCYTE [DISTWIDTH] IN BLOOD BY AUTOMATED COUNT: 12.7 % (ref 11.6–15.1)
HCT VFR BLD AUTO: 34.5 % (ref 34.8–46.1)
HGB BLD-MCNC: 11.6 G/DL (ref 11.5–15.4)
IMM GRANULOCYTES # BLD AUTO: 0.05 THOUSAND/UL (ref 0–0.2)
IMM GRANULOCYTES NFR BLD AUTO: 1 % (ref 0–2)
LYMPHOCYTES # BLD AUTO: 1.56 THOUSANDS/ÂΜL (ref 0.6–4.47)
LYMPHOCYTES NFR BLD AUTO: 17 % (ref 14–44)
MCH RBC QN AUTO: 32.8 PG (ref 26.8–34.3)
MCHC RBC AUTO-ENTMCNC: 33.6 G/DL (ref 31.4–37.4)
MCV RBC AUTO: 98 FL (ref 82–98)
MONOCYTES # BLD AUTO: 0.59 THOUSAND/ÂΜL (ref 0.17–1.22)
MONOCYTES NFR BLD AUTO: 6 % (ref 4–12)
NEUTROPHILS # BLD AUTO: 6.86 THOUSANDS/ÂΜL (ref 1.85–7.62)
NEUTS SEG NFR BLD AUTO: 74 % (ref 43–75)
NRBC BLD AUTO-RTO: 0 /100 WBCS
PLATELET # BLD AUTO: 212 THOUSANDS/UL (ref 149–390)
PMV BLD AUTO: 11.6 FL (ref 8.9–12.7)
RBC # BLD AUTO: 3.54 MILLION/UL (ref 3.81–5.12)
RH BLD: NEGATIVE
SPECIMEN EXPIRATION DATE: NORMAL
WBC # BLD AUTO: 9.25 THOUSAND/UL (ref 4.31–10.16)

## 2023-03-13 PROCEDURE — 4A1HXCZ MONITORING OF PRODUCTS OF CONCEPTION, CARDIAC RATE, EXTERNAL APPROACH: ICD-10-PCS | Performed by: STUDENT IN AN ORGANIZED HEALTH CARE EDUCATION/TRAINING PROGRAM

## 2023-03-13 NOTE — H&P
History & Physical - OB/GYN   Melva Molina 40 y o  female MRN: [id number]  Unit/Bed#:  Encounter: [id number]    Assessment:   40 y o  Negra Hicmkan at 37w0d for scheduled primary  section in setting of placenta previa  Plan:   1  Admit to L&D  2  Place IV and IV fluids  3  Labs: CBC, RPR, type and screen  4  Continue NPO  5  Fetal monitoring and toco  6  Anesthesia evaluation  7  Preop Antibiotics ordered    Twin pregnancy with single intrauterine death, first trimester, fetus 1  - Will send placenta to pathology    Rh negative state in antepartum period  - Plan for routine Rhogam workup postpartum  Placenta previa, third trimester  - Proceed with primary  delivery as scheduled at 37 weeks  - Will crossmatch x 2 units PRBC due to increased risk of hemorrhage   - Plan for administration of tranexamic acid 1000 mg IV at time of cord clamping  Anemia during pregnancy in third trimester  - Has been on oral iron supplementation in 3rd trimester    - Will crossmatch x 2 units PRBC due to increased risk of hemorrhage with placenta previa             ________________________________________________________  40 y o  yo  at 37w0d with TIFFANY of 2023, by IVF dating    She is a patient of [id number]  Chief complaint:  Scheduled     HPI:  40 y o  yo  at 37w0d with Estimated Date of Delivery: 23 admitted for scheduled primary  section for placenta previa  Pregnancy Complications:  X6S1 Problems (from 22 to present)     Problem Noted Resolved    Anemia during pregnancy in third trimester 2023 by Elizabeth Tovar MD No    Overview Signed 2023 10:36 AM by Elizabeth Tovar MD     Mild anemia (Hgb 10 6) noted at 28 weeks  PO iron supplementation recommended            Elevated BP without diagnosis of hypertension 2022 by Sulaiman Jama DO No    Overview Signed 2022 10:41 AM by Sulaiman Jama DO     /72, repeat 130/72 on 2022  Patient states she get nervous with blood pressure checks  Will continue to monitor for White coat syndrome vs GHTN         Placenta previa, third trimester 2022 by Parul Miller MD No    Overview Addendum 2023  8:33 PM by Wadie Osgood, MD     Posterior previa with inferior edge extending 3 3 cm past internal os at 29 weeks  Delivery recommended [id number] via primary       is scheduled at 37 0 weeks on 3/14/2023 if previa persists  [ ] Repeat ultrasound at 36 weeks - scheduled         37 weeks gestation of pregnancy 11/15/2022 by Wadie Osgood, MD No    Overview Addendum 2023  8:32 AM by Wadie Osgood, MD     Covid vaccine J&J 10/22/2021    Flu vaccine - counseled and considering 2022  Adacel - 2023         Rh negative state in antepartum period 11/15/2022 by Wadie Osgood, MD No    Overview Addendum 2023 10:36 AM by Audra Solomon MD     Blood type O neg    Rhogam given 2023         Pregnancy resulting from in vitro fertilization in third trimester 2022 by Macario Machado DO No    Overview Addendum 2023 10:40 AM by Audra Solomon MD     Di-Di Twins via I V F  under [facility]) - spontaneous loss of one twin noted on 11 week u/s  Start  mg P O  at 12 wks  Referred to [facility] for  testing    [x] 22-24 week fetal echo - normal  [x] 3rd trimester fetal growth  [  ] weekly nonstress tests and fluid scans starting at 36 weeks           Twin pregnancy with single intrauterine death, first trimester, fetus 1 2022 by Macario Machado DO No    Overview Addendum 11/15/2022  8:22 AM by Wadie Osgood, MD     Di-Di Twins via I V F  under [facility]  mg at 12 weeks    2022 first PNV at [facility] - One fetus with FHT and EGA 12 1 weeks, second twin without fetal heart motion at EGA  8w 4d c/w fetal demise and currently SIUP         AMA (advanced maternal age) multigravida 33+, third trimester 2022 by Tyrese Falk DO No    Overview Addendum 11/15/2022  8:25 AM by Rod Corcoran MD      mg starting at 12 wks    NIPT low risk               PMH:  No past medical history on file  PSH:  Past Surgical History:   Procedure Laterality Date   • WISDOM TOOTH EXTRACTION      7TH GRADE       Social Hx:  Social History     Tobacco Use   • Smoking status: Former     Types: Cigarettes     Passive exposure: Never   • Smokeless tobacco: Never   Vaping Use   • Vaping Use: Never used   Substance Use Topics   • Alcohol use: Not Currently   • Drug use: Never        OB Hx:  OB History    Para Term  AB Living   1 0 0 0 0 0   SAB IAB Ectopic Multiple Live Births   0 0 0 0 0      # Outcome Date GA Lbr Beny/2nd Weight Sex Delivery Anes PTL Lv   1 Current                Meds:  No current facility-administered medications on file prior to encounter  Current Outpatient Medications on File Prior to Encounter   Medication Sig Dispense Refill   • Calcium Carbonate (CALCIUM 500 PO) Take by mouth 1000mg daily     • Prenatal MV-Min-Fe Fum-FA-DHA (PRENATAL 1 PO) Take by mouth         Allergies:  No Known Allergies    Labs:  ABO Grouping   Date Value Ref Range Status   2023 O  Final      Rh Type   Date Value Ref Range Status   2023 Negative  Final     Comment:     Please note: Prior records for this patient's ABO / Rh type are not  available for additional verification  Rh Type   Date Value Ref Range Status   2023 Negative  Final     Comment:     Please note: Prior records for this patient's ABO / Rh type are not  available for additional verification         HIV-1/HIV-2 AB   Date Value Ref Range Status   2022 Non-Reactive  Final     Hepatitis B Surface Ag   Date Value Ref Range Status   2022 negative  Final     HEP C AB   Date Value Ref Range Status   2022 <0 1 0 0 - 0 9 s/co ratio Final     Comment:                                       Negative: < 0 8                               Indeterminate: 0 8 - 0 9                                    Positive:     > 0 9   HCV antibody alone does not differentiate between   previous resolved infection and active infection  The CDC and current clinical guidelines recommend   that a positive HCV antibody result be followed up   with an HCV RNA test to support the diagnosis of   acute HCV infection  [facility] offers Hepatitis C   Virus (HCV) RNA, Diagnosis, MAO (305083) and   Hepatitis C Virus (HCV) Antibody with reflex to   Quantitative Real-time PCR (305696)  RPR   Date Value Ref Range Status   2023 Non Reactive Non Reactive Final     External Rubella IGG Quantitation   Date Value Ref Range Status   2022 immune  Final     Glucose   Date Value Ref Range Status   2023 65 (L) 70 - 139 mg/dL Final     Comment:     According to ADA, a glucose threshold of >139 mg/dL after 50-gram  load identifies approximately 80% of women with gestational  diabetes mellitus, while the sensitivity is further increased to  approximately 90% by a threshold of >129 mg/dL  No results found for: [id number]  Strep Grp B MAO   Date Value Ref Range Status   2023 Negative Negative Final     Comment:     Centers for Disease Control and Prevention (CDC) and American Congress  of Obstetricians and Gynecologists (ACOG) guidelines for prevention of   group B streptococcal (GBS) disease specify co-collection of  a vaginal and rectal swab specimen to maximize sensitivity of GBS  detection  Per the CDC and ACOG, swabbing both the lower vagina and  rectum substantially increases the yield of detection compared with  sampling the vagina alone  Penicillin G, ampicillin, or cefazolin are indicated for intrapartum  prophylaxis of  GBS colonization   Reflex susceptibility  testing should be performed prior to use of clindamycin only on GBS  isolates from penicillin-allergic women who are considered a high risk  for anaphylaxis  Treatment with vancomycin without additional testing  is warranted if resistance to clindamycin is noted           Physical Exam:  Vitals and physical exam will be done day of admission    Aide Chao MD  3/13/2023 12:23 PM

## 2023-03-13 NOTE — ASSESSMENT & PLAN NOTE
- Proceed with primary  delivery as scheduled at 37 weeks  - Will crossmatch x 2 units PRBC due to increased risk of hemorrhage   - Plan for administration of tranexamic acid 1000 mg IV at time of cord clamping

## 2023-03-14 ENCOUNTER — ANESTHESIA (INPATIENT)
Dept: LABOR AND DELIVERY | Facility: HOSPITAL | Age: 38
End: 2023-03-14

## 2023-03-14 ENCOUNTER — HOSPITAL ENCOUNTER (INPATIENT)
Facility: HOSPITAL | Age: 38
LOS: 2 days | Discharge: HOME | End: 2023-03-16
Attending: STUDENT IN AN ORGANIZED HEALTH CARE EDUCATION/TRAINING PROGRAM | Admitting: STUDENT IN AN ORGANIZED HEALTH CARE EDUCATION/TRAINING PROGRAM

## 2023-03-14 DIAGNOSIS — O99.013 ANEMIA DURING PREGNANCY IN THIRD TRIMESTER: ICD-10-CM

## 2023-03-14 DIAGNOSIS — O44.03 PLACENTA PREVIA, THIRD TRIMESTER: ICD-10-CM

## 2023-03-14 DIAGNOSIS — O09.813 PREGNANCY RESULTING FROM IN VITRO FERTILIZATION IN THIRD TRIMESTER: ICD-10-CM

## 2023-03-14 DIAGNOSIS — O09.523 AMA (ADVANCED MATERNAL AGE) MULTIGRAVIDA 35+, THIRD TRIMESTER: ICD-10-CM

## 2023-03-14 DIAGNOSIS — Z3A.37 37 WEEKS GESTATION OF PREGNANCY: Primary | ICD-10-CM

## 2023-03-14 DIAGNOSIS — Z98.891 S/P C-SECTION: ICD-10-CM

## 2023-03-14 PROBLEM — O13.3 GESTATIONAL HYPERTENSION, THIRD TRIMESTER: Status: ACTIVE | Noted: 2023-03-14

## 2023-03-14 LAB
ABO GROUP BLD BPU: NORMAL
ABO GROUP BLD BPU: NORMAL
ABO GROUP BLD: NORMAL
ABO GROUP BLD: NORMAL
ALBUMIN SERPL BCP-MCNC: 3.5 G/DL (ref 3.5–5)
ALP SERPL-CCNC: 115 U/L (ref 34–104)
ALT SERPL W P-5'-P-CCNC: 13 U/L (ref 7–52)
ANION GAP SERPL CALCULATED.3IONS-SCNC: 7 MMOL/L (ref 4–13)
AST SERPL W P-5'-P-CCNC: 18 U/L (ref 13–39)
BASE EXCESS BLDCOA CALC-SCNC: -2.7 MMOL/L (ref 3–11)
BASE EXCESS BLDCOV CALC-SCNC: -4.4 MMOL/L (ref 1–9)
BILIRUB SERPL-MCNC: 0.46 MG/DL (ref 0.2–1)
BLD GP AB SCN SERPL QL: POSITIVE
BLD GP AB SCN SERPL QL: POSITIVE
BPU ID: NORMAL
BPU ID: NORMAL
BUN SERPL-MCNC: 15 MG/DL (ref 5–25)
CALCIUM SERPL-MCNC: 8.5 MG/DL (ref 8.4–10.2)
CHLORIDE SERPL-SCNC: 109 MMOL/L (ref 96–108)
CO2 SERPL-SCNC: 21 MMOL/L (ref 21–32)
CREAT SERPL-MCNC: 0.66 MG/DL (ref 0.6–1.3)
CREAT UR-MCNC: 59.4 MG/DL
CROSSMATCH: NORMAL
CROSSMATCH: NORMAL
FETAL CELL SCN BLD QL ROSETTE: NEGATIVE
GFR SERPL CREATININE-BSD FRML MDRD: 114 ML/MIN/1.73SQ M
GLUCOSE SERPL-MCNC: 83 MG/DL (ref 65–140)
HCO3 BLDCOA-SCNC: 24.7 MMOL/L (ref 17.3–27.3)
HCO3 BLDCOV-SCNC: 22.1 MMOL/L (ref 12.2–28.6)
HOLD SPECIMEN: NORMAL
O2 CT VFR BLDCOA CALC: 3.6 ML/DL
OXYHGB MFR BLDCOA: 17.3 %
OXYHGB MFR BLDCOV: 15.1 %
PCO2 BLDCOA: 53.6 MM[HG] (ref 30–60)
PCO2 BLDCOV: 45.5 MM HG (ref 27–43)
PH BLDCOA: 7.28 [PH] (ref 7.23–7.43)
PH BLDCOV: 7.3 [PH] (ref 7.19–7.49)
PO2 BLDCOA: 11.5 MM HG (ref 5–25)
PO2 BLDCOV: 16.1 MM HG (ref 15–45)
POTASSIUM SERPL-SCNC: 3.6 MMOL/L (ref 3.5–5.3)
PROT SERPL-MCNC: 6.6 G/DL (ref 6.4–8.4)
PROT UR-MCNC: 7 MG/DL
PROT/CREAT UR: 0.12 MG/G{CREAT} (ref 0–0.1)
RH BLD: NEGATIVE
RH BLD: NEGATIVE
SAO2 % BLDCOV: 7.1 ML/DL
SODIUM SERPL-SCNC: 137 MMOL/L (ref 135–147)
SPECIMEN EXPIRATION DATE: NORMAL
TREPONEMA PALLIDUM IGG+IGM AB [PRESENCE] IN SERUM OR PLASMA BY IMMUNOASSAY: NORMAL
UNIT DISPENSE STATUS: NORMAL
UNIT DISPENSE STATUS: NORMAL
UNIT PRODUCT CODE: NORMAL
UNIT PRODUCT CODE: NORMAL
UNIT PRODUCT VOLUME: 350 ML
UNIT PRODUCT VOLUME: 350 ML
UNIT RH: NORMAL
UNIT RH: NORMAL

## 2023-03-14 PROCEDURE — 3E0334Z INTRODUCTION OF SERUM, TOXOID AND VACCINE INTO PERIPHERAL VEIN, PERCUTANEOUS APPROACH: ICD-10-PCS | Performed by: STUDENT IN AN ORGANIZED HEALTH CARE EDUCATION/TRAINING PROGRAM

## 2023-03-14 RX ORDER — TRISODIUM CITRATE DIHYDRATE AND CITRIC ACID MONOHYDRATE 500; 334 MG/5ML; MG/5ML
30 SOLUTION ORAL ONCE
Status: DISCONTINUED | OUTPATIENT
Start: 2023-03-14 | End: 2023-03-14

## 2023-03-14 RX ORDER — OXYCODONE HYDROCHLORIDE 5 MG/1
5 TABLET ORAL EVERY 4 HOURS PRN
Status: DISCONTINUED | OUTPATIENT
Start: 2023-03-15 | End: 2023-03-16 | Stop reason: HOSPADM

## 2023-03-14 RX ORDER — KETOROLAC TROMETHAMINE 30 MG/ML
15 INJECTION, SOLUTION INTRAMUSCULAR; INTRAVENOUS EVERY 6 HOURS
Status: DISCONTINUED | OUTPATIENT
Start: 2023-03-14 | End: 2023-03-14

## 2023-03-14 RX ORDER — ACETAMINOPHEN 325 MG/1
650 TABLET ORAL EVERY 6 HOURS SCHEDULED
Status: CANCELLED | OUTPATIENT
Start: 2023-03-14 | End: 2023-03-15

## 2023-03-14 RX ORDER — OXYCODONE HYDROCHLORIDE 5 MG/1
5 TABLET ORAL EVERY 4 HOURS PRN
Status: DISCONTINUED | OUTPATIENT
Start: 2023-03-15 | End: 2023-03-14

## 2023-03-14 RX ORDER — FENTANYL CITRATE/PF 50 MCG/ML
25 SYRINGE (ML) INJECTION
Status: DISCONTINUED | OUTPATIENT
Start: 2023-03-14 | End: 2023-03-16 | Stop reason: HOSPADM

## 2023-03-14 RX ORDER — SODIUM CHLORIDE, SODIUM LACTATE, POTASSIUM CHLORIDE, CALCIUM CHLORIDE 600; 310; 30; 20 MG/100ML; MG/100ML; MG/100ML; MG/100ML
125 INJECTION, SOLUTION INTRAVENOUS CONTINUOUS
Status: DISCONTINUED | OUTPATIENT
Start: 2023-03-14 | End: 2023-03-16 | Stop reason: HOSPADM

## 2023-03-14 RX ORDER — OXYTOCIN/RINGER'S LACTATE 30/500 ML
62.5 PLASTIC BAG, INJECTION (ML) INTRAVENOUS ONCE
Status: COMPLETED | OUTPATIENT
Start: 2023-03-14 | End: 2023-03-14

## 2023-03-14 RX ORDER — SODIUM CHLORIDE, SODIUM LACTATE, POTASSIUM CHLORIDE, CALCIUM CHLORIDE 600; 310; 30; 20 MG/100ML; MG/100ML; MG/100ML; MG/100ML
INJECTION, SOLUTION INTRAVENOUS CONTINUOUS PRN
Status: DISCONTINUED | OUTPATIENT
Start: 2023-03-14 | End: 2023-03-14

## 2023-03-14 RX ORDER — DOCUSATE SODIUM 100 MG/1
100 CAPSULE, LIQUID FILLED ORAL 2 TIMES DAILY PRN
Status: DISCONTINUED | OUTPATIENT
Start: 2023-03-14 | End: 2023-03-16 | Stop reason: HOSPADM

## 2023-03-14 RX ORDER — METOCLOPRAMIDE HYDROCHLORIDE 5 MG/ML
10 INJECTION INTRAMUSCULAR; INTRAVENOUS EVERY 6 HOURS PRN
Status: DISCONTINUED | OUTPATIENT
Start: 2023-03-14 | End: 2023-03-16 | Stop reason: HOSPADM

## 2023-03-14 RX ORDER — OXYCODONE HYDROCHLORIDE 5 MG/1
10 TABLET ORAL EVERY 4 HOURS PRN
Status: DISCONTINUED | OUTPATIENT
Start: 2023-03-15 | End: 2023-03-16 | Stop reason: HOSPADM

## 2023-03-14 RX ORDER — OXYCODONE HYDROCHLORIDE 5 MG/1
10 TABLET ORAL EVERY 4 HOURS PRN
Status: DISCONTINUED | OUTPATIENT
Start: 2023-03-15 | End: 2023-03-14

## 2023-03-14 RX ORDER — NALBUPHINE HCL 10 MG/ML
5 AMPUL (ML) INJECTION
Status: ACTIVE | OUTPATIENT
Start: 2023-03-14 | End: 2023-03-15

## 2023-03-14 RX ORDER — ONDANSETRON 2 MG/ML
4 INJECTION INTRAMUSCULAR; INTRAVENOUS EVERY 8 HOURS PRN
Status: DISCONTINUED | OUTPATIENT
Start: 2023-03-14 | End: 2023-03-16 | Stop reason: HOSPADM

## 2023-03-14 RX ORDER — ACETAMINOPHEN 325 MG/1
650 TABLET ORAL EVERY 4 HOURS PRN
Status: DISCONTINUED | OUTPATIENT
Start: 2023-03-14 | End: 2023-03-16 | Stop reason: HOSPADM

## 2023-03-14 RX ORDER — SIMETHICONE 80 MG
80 TABLET,CHEWABLE ORAL 4 TIMES DAILY PRN
Status: DISCONTINUED | OUTPATIENT
Start: 2023-03-14 | End: 2023-03-16 | Stop reason: HOSPADM

## 2023-03-14 RX ORDER — ONDANSETRON 2 MG/ML
4 INJECTION INTRAMUSCULAR; INTRAVENOUS ONCE AS NEEDED
Status: DISCONTINUED | OUTPATIENT
Start: 2023-03-14 | End: 2023-03-16 | Stop reason: HOSPADM

## 2023-03-14 RX ORDER — MORPHINE SULFATE 1 MG/ML
INJECTION, SOLUTION EPIDURAL; INTRATHECAL; INTRAVENOUS AS NEEDED
Status: DISCONTINUED | OUTPATIENT
Start: 2023-03-14 | End: 2023-03-14

## 2023-03-14 RX ORDER — OXYTOCIN/RINGER'S LACTATE 30/500 ML
PLASTIC BAG, INJECTION (ML) INTRAVENOUS CONTINUOUS PRN
Status: DISCONTINUED | OUTPATIENT
Start: 2023-03-14 | End: 2023-03-14

## 2023-03-14 RX ORDER — FENTANYL CITRATE 50 UG/ML
INJECTION, SOLUTION INTRAMUSCULAR; INTRAVENOUS AS NEEDED
Status: DISCONTINUED | OUTPATIENT
Start: 2023-03-14 | End: 2023-03-14

## 2023-03-14 RX ORDER — CALCIUM CARBONATE 200(500)MG
1000 TABLET,CHEWABLE ORAL 3 TIMES DAILY PRN
Status: DISCONTINUED | OUTPATIENT
Start: 2023-03-14 | End: 2023-03-16 | Stop reason: HOSPADM

## 2023-03-14 RX ORDER — DIPHENHYDRAMINE HYDROCHLORIDE 50 MG/ML
25 INJECTION INTRAMUSCULAR; INTRAVENOUS EVERY 6 HOURS PRN
Status: DISCONTINUED | OUTPATIENT
Start: 2023-03-14 | End: 2023-03-16 | Stop reason: HOSPADM

## 2023-03-14 RX ORDER — BUPIVACAINE HYDROCHLORIDE 7.5 MG/ML
INJECTION, SOLUTION INTRASPINAL AS NEEDED
Status: DISCONTINUED | OUTPATIENT
Start: 2023-03-14 | End: 2023-03-14

## 2023-03-14 RX ORDER — IBUPROFEN 600 MG/1
600 TABLET ORAL EVERY 6 HOURS
Status: DISCONTINUED | OUTPATIENT
Start: 2023-03-15 | End: 2023-03-16 | Stop reason: HOSPADM

## 2023-03-14 RX ORDER — ONDANSETRON 2 MG/ML
INJECTION INTRAMUSCULAR; INTRAVENOUS AS NEEDED
Status: DISCONTINUED | OUTPATIENT
Start: 2023-03-14 | End: 2023-03-14

## 2023-03-14 RX ORDER — NALOXONE HYDROCHLORIDE 0.4 MG/ML
0.1 INJECTION, SOLUTION INTRAMUSCULAR; INTRAVENOUS; SUBCUTANEOUS
Status: CANCELLED | OUTPATIENT
Start: 2023-03-14 | End: 2023-03-15

## 2023-03-14 RX ORDER — CEFAZOLIN SODIUM 2 G/50ML
2000 SOLUTION INTRAVENOUS ONCE
Status: COMPLETED | OUTPATIENT
Start: 2023-03-14 | End: 2023-03-14

## 2023-03-14 RX ORDER — KETOROLAC TROMETHAMINE 30 MG/ML
30 INJECTION, SOLUTION INTRAMUSCULAR; INTRAVENOUS EVERY 6 HOURS SCHEDULED
Status: COMPLETED | OUTPATIENT
Start: 2023-03-14 | End: 2023-03-15

## 2023-03-14 RX ADMIN — ONDANSETRON HYDROCHLORIDE 4 MG: 2 INJECTION, SOLUTION INTRAMUSCULAR; INTRAVENOUS at 15:26

## 2023-03-14 RX ADMIN — TRANEXAMIC ACID 1000 MG: 100 INJECTION, SOLUTION INTRAVENOUS at 08:45

## 2023-03-14 RX ADMIN — CEFAZOLIN SODIUM 2000 MG: 2 SOLUTION INTRAVENOUS at 08:23

## 2023-03-14 RX ADMIN — ONDANSETRON 4 MG: 2 INJECTION INTRAMUSCULAR; INTRAVENOUS at 08:27

## 2023-03-14 RX ADMIN — SODIUM CHLORIDE, SODIUM LACTATE, POTASSIUM CHLORIDE, AND CALCIUM CHLORIDE: .6; .31; .03; .02 INJECTION, SOLUTION INTRAVENOUS at 09:12

## 2023-03-14 RX ADMIN — Medication 250 MILLI-UNITS/MIN: at 08:45

## 2023-03-14 RX ADMIN — KETOROLAC TROMETHAMINE 15 MG: 30 INJECTION, SOLUTION INTRAMUSCULAR; INTRAVENOUS at 09:55

## 2023-03-14 RX ADMIN — FENTANYL CITRATE 15 MCG: 50 INJECTION, SOLUTION INTRAMUSCULAR; INTRAVENOUS at 08:21

## 2023-03-14 RX ADMIN — MORPHINE SULFATE 0.15 MG: 1 INJECTION, SOLUTION EPIDURAL; INTRATHECAL; INTRAVENOUS at 08:21

## 2023-03-14 RX ADMIN — SODIUM CHLORIDE, SODIUM LACTATE, POTASSIUM CHLORIDE, AND CALCIUM CHLORIDE 1000 ML: .6; .31; .03; .02 INJECTION, SOLUTION INTRAVENOUS at 06:47

## 2023-03-14 RX ADMIN — Medication 62.5 MILLI-UNITS/MIN: at 10:00

## 2023-03-14 RX ADMIN — METOCLOPRAMIDE 10 MG: 5 INJECTION, SOLUTION INTRAMUSCULAR; INTRAVENOUS at 12:00

## 2023-03-14 RX ADMIN — SODIUM CHLORIDE, SODIUM LACTATE, POTASSIUM CHLORIDE, AND CALCIUM CHLORIDE 125 ML/HR: .6; .31; .03; .02 INJECTION, SOLUTION INTRAVENOUS at 17:18

## 2023-03-14 RX ADMIN — PHENYLEPHRINE HYDROCHLORIDE 30 MCG/MIN: 10 INJECTION, SOLUTION INTRAVENOUS at 08:18

## 2023-03-14 RX ADMIN — HUMAN RHO(D) IMMUNE GLOBULIN 300 MCG: 300 INJECTION, SOLUTION INTRAMUSCULAR at 17:23

## 2023-03-14 RX ADMIN — BUPIVACAINE HYDROCHLORIDE IN DEXTROSE 1.6 ML: 7.5 INJECTION, SOLUTION SUBARACHNOID at 08:21

## 2023-03-14 RX ADMIN — SODIUM CHLORIDE, SODIUM LACTATE, POTASSIUM CHLORIDE, AND CALCIUM CHLORIDE: .6; .31; .03; .02 INJECTION, SOLUTION INTRAVENOUS at 08:15

## 2023-03-14 RX ADMIN — ACETAMINOPHEN 650 MG: 325 TABLET, FILM COATED ORAL at 13:36

## 2023-03-14 RX ADMIN — KETOROLAC TROMETHAMINE 30 MG: 30 INJECTION, SOLUTION INTRAMUSCULAR; INTRAVENOUS at 15:26

## 2023-03-14 RX ADMIN — KETOROLAC TROMETHAMINE 30 MG: 30 INJECTION, SOLUTION INTRAMUSCULAR; INTRAVENOUS at 21:30

## 2023-03-14 NOTE — OP NOTE
OPERATIVE REPORT  PATIENT NAME: Melva Molina    :  1985  MRN: [id number]  Pt Location:  L&D OR ROOM 01    SURGERY DATE: 3/14/2023    Surgeon(s) and Role:     * Elizabeth Tovar MD - Primary     * Ulises Wallace DO - Assisting    Pre-op Diagnosis:    1  [place] pregnancy at 37w0d in vertex presentation   2  Placenta previa without hemorrhage   3  Gestational hypertension    Post-op Diagnosis: Same, delivered    Procedure: Primary low-transverse  section via Pfannenstiel skin incision    Specimen(s):  ID Type Source Tests Collected by Time Destination   1 :  Tissue (Placenta on Hold) OB Only Placenta TISSUE EXAM OB (PLACENTA) ONLY Elizabeth Tovar MD 3/14/2023 0705    A :  Cord Blood Cord BLOOD GAS, VENOUS, CORD, BLOOD GAS, ARTERIAL, CORD Elizabeth Tovar MD 3/14/2023 7606        Quantitative blood loss: 977 mL    Drains:  Urethral Catheter Latex 16 Fr  (Active)   Site Assessment Clean;Skin intact 23 1000   [place] Care Done 23 1000   Collection Container Standard drainage bag 23 1000   Securement Method Other (Comment) 23 1000   Output (mL) 750 mL 23 1045   Number of days: 0     Anesthesia Type: Spinal    Operative Indications:   Melva Molina is a 40 y o  Jerri Bora at 37w0d for primary  section for placenta previa  At the time of admission, she met criteria for gestational hypertension based on mildly elevated blood pressures in the absence of proteinuria  All risks, benefits, and alternatives were discussed with the patient  All questions were answered  The patient agreed to proceed with surgery  [facility] Classification System:  No Multiple pregnancy, No Transverse or oblique lie, No Breech lie, Gestational age is > or =37 weeks, Nulliparous, Prelabor CD is JOSH GROUP 2b    Operative Findings:  1   Live infant, "Vinnie Rhe," delivered from vertex position through clear fluid at 08:43, weight 2700 grams, Apgar scores of  9 and 9 at 1 and 5 minutes, respectively  No nuchal cord  2  Intact placenta delivered via fundal massage, 3-vessel cord noted  3  Normal appearing bilateral fallopian tubes and ovaries  Complications: None    Procedure and Technique:    The patient was taken to the operating room, where she was placed under spinal anesthesia  The patient was then placed in supine position with a leftward tilt  She was prepped and draped in the normal sterile fashion  When anesthesia was found to be adequate, a Pfannenstiel skin incision was made with a scalpel and carried down to the underlying layer of fascia  The fascia was then incised with a scalpel and extended laterally and superiorly with curved Mccormack scissors  The superior portion of the fascial incision was then grasped with two Kocher clamps, elevated, and  from the underlying rectus muscles with sharp and blunt dissection  Attention was then turned to the inferior aspect of the fascial incision, which in a similar manner, was grasped with two Kocher clamps, elevated, and  from the underlying rectus muscles using sharp and blunt dissection  The rectus muscles were then  in the midline, and the peritoneum was entered bluntly  The peritoneal incision was then extended superiorly, inferiorly, and laterally using blunt dissection and electrocautery with excellent visualization of the bladder  When adequate exposure had been achieved, the bladder blade was then placed  A low transverse uterine incision was then made with a scalpel  The uterus was entered bluntly and the hysterotomy was extended bluntly in a cephalad-caudad manner  Amniotomy was performed  The infant was then delivered in vertex presentation  After delayed cord clamping for 30 seconds, the cord was clamped and cut, and the infant was handed off to awaiting Neonatologist  Tiffany Argueta blood was collected and the placenta was delivered with fundal massage noted to be intact with 3-vessel cord  The uterus was then exteriorized and cleared of all clots and debris  The hysterotomy was then closed with 0-Vicryl suture in a running locked fashion  A second layer of the same suture was used in a imbricating fashion in order to obtain excellent hemostasis  The uterus was then returned to the abdomen, and the gutters were cleared of all clots and debris  The hysterotomy was inspected and noted to be hemostatic  The fascia was then closed with 0 Vicryl suture in a running fashion  The deep subcutaneous tissue was then irrigated and closed with 3-0 Vicryl suture in a running fashion  The skin was then closed with 4-0 Vicryl suture in subcuticular fashion  The patient tolerated the procedure well  Sponge, lap, and needle counts were correct x 3  The patient was taken to the Recovery Room in stable condition  I was present for the entire procedure  A qualified resident physician was not available  A co-surgeon was required because of skills and techniques relevant to speciality      Patient Disposition:  PACU     SIGNATURE: Sridevi Mccartney MD  DATE: March 14, 2023  TIME: 11:31 AM

## 2023-03-14 NOTE — INTERVAL H&P NOTE
H&P reviewed  Upon admission today, the patient's blood pressure is mildly elevated, thus indicating possible gestational hypertension versus pre-eclampsia  She is without signs/symptoms of severe features  Metabolic panel and urine P:C were sent upon admission  Otherwise, I find no changes in the patients condition since the H&P was written  Will proceed with delivery as scheduled  Prior to surgery, risks of surgery were reviewed  We specifically reviewed the risk of bleeding, infection, damage to surrounding organs/structures (specifically bowel, bladder, vessels, nerves, ureters), difficulty healing, scar tissue formation, hernia, need for hysterectomy, need for blood transfusion, and need for further surgery  We also reviewed implications for increased morbidity in subsequent pregnancy such as accreta or uterine rupture       Vitals:    03/14/23 0700 03/14/23 0721 03/14/23 0729 03/14/23 0741   BP: 141/83 148/81 139/85 146/92   BP Location: Right arm      Pulse: 73      Resp: 16      Temp: (!) 97 3 °F (36 3 °C)      TempSrc: Temporal      SpO2: 100%      Weight: 95 9 kg (211 lb 6 4 oz)      Height: 6' 1" (1 854 m)          Physical Exam:  /92   Pulse 73   Temp (!) 97 3 °F (36 3 °C) (Temporal)   Resp 16   Ht 6' 1" (1 854 m)   Wt 95 9 kg (211 lb 6 4 oz)   LMP 06/30/2022   SpO2 100%   BMI 27 89 kg/m²     Gen: alert, no acute distress  Cardiac: regular rate  Resp: unlabored breathing  Abdomen: gravid, non-tender, non-distended  Extremities: non-tender, no edema    Estimated Fetal Weight: 7 0 lbs  Presentation: cephalic, confirmed via u/s    FHT:  Baseline Rate: 135 bpm  Variability: Moderate 6-25 bpm  Accelerations: 15 x 15 or greater  Decelerations: None  FHR Category:  (Reactive, no decelerations)  TOCO:   Contraction Frequency (minutes): Rare  Contraction Duration (seconds): 60-80  Contraction Quality: Mild    Membranes: Intact      Maybell Buerger, MD  3/14/2023 7:57 AM

## 2023-03-14 NOTE — ANESTHESIA PROCEDURE NOTES
Spinal Block    Patient location during procedure: OB  Start time: 3/14/2023 8:21 AM  Reason for block: primary anesthetic  Staffing  Performed: CRNA   Resident/CRNA: Jovany Mittal CRNA  Preanesthetic Checklist  Completed: patient identified, IV checked, site marked, risks and benefits discussed, surgical consent, monitors and equipment checked, pre-op evaluation and timeout performed  Spinal Block  Patient position: sitting  Prep: ChloraPrep  Patient monitoring: heart rate, cardiac monitor, continuous pulse ox and frequent blood pressure checks  Approach: midline  Location: L3-4  Needle  Needle type: pencil-tip   Needle gauge: 25 G  Needle length: 10 cm  Assessment  Sensory level: T4  Injection Assessment:  negative aspiration for heme, no paresthesia on injection and positive aspiration for clear CSF    Post-procedure:  site cleaned

## 2023-03-14 NOTE — PLAN OF CARE
Problem: PAIN - ADULT  Goal: Verbalizes/displays adequate comfort level or baseline comfort level  Description: Interventions:  - Encourage patient to monitor pain and request assistance  - Assess pain using appropriate pain scale  - Administer analgesics based on type and severity of pain and evaluate response  - Implement non-pharmacological measures as appropriate and evaluate response  - Consider cultural and social influences on pain and pain management  - Notify physician/advanced practitioner if interventions unsuccessful or patient reports new pain  Outcome: Progressing     Problem: INFECTION - ADULT  Goal: Absence or prevention of progression during hospitalization  Description: INTERVENTIONS:  - Assess and monitor for signs and symptoms of infection  - Monitor lab/diagnostic results  - Monitor all insertion sites, i e  indwelling lines, tubes, and drains  - Monitor endotracheal if appropriate and nasal secretions for changes in amount and color  - [place] appropriate cooling/warming therapies per order  - Administer medications as ordered  - Instruct and encourage patient and family to use good hand hygiene technique  - Identify and instruct in appropriate isolation precautions for identified infection/condition  Outcome: Progressing  Goal: Absence of fever/infection during neutropenic period  Description: INTERVENTIONS:  - Monitor WBC    Outcome: Progressing     Problem: SAFETY ADULT  Goal: Patient will remain free of falls  Description: INTERVENTIONS:  - Educate patient/family on patient safety including physical limitations  - Instruct patient to call for assistance with activity   - Consult OT/PT to assist with strengthening/mobility   - Keep Call bell within reach  - Keep bed low and locked with side rails adjusted as appropriate  - Keep care items and personal belongings within reach  - Initiate and maintain comfort rounds    Outcome: Progressing  Goal: Maintain or return to baseline ADL function  Description: INTERVENTIONS:  -  Assess patient's ability to carry out ADLs; assess patient's baseline for ADL function and identify physical deficits which impact ability to perform ADLs (bathing, care of mouth/teeth, toileting, grooming, dressing, etc )  - Assess/evaluate cause of self-care deficits   - Assess range of motion  - Assess patient's mobility; develop plan if impaired  - Assess patient's need for assistive devices and provide as appropriate  - Encourage maximum independence but intervene and supervise when necessary  - Involve family in performance of ADLs  - Assess for home care needs following discharge   - Consider OT consult to assist with ADL evaluation and planning for discharge  - Provide patient education as appropriate  Outcome: Progressing  Goal: Maintains/Returns to pre admission functional level  Description: INTERVENTIONS:  - Perform BMAT or MOVE assessment daily    - Set and communicate daily mobility goal to care team and patient/family/caregiver  - Collaborate with rehabilitation services on mobility goals if consulted    - Record patient progress and toleration of activity level   Outcome: Progressing     Problem: Knowledge Deficit  Goal: Patient/family/caregiver demonstrates understanding of disease process, treatment plan, medications, and discharge instructions  Description: Complete learning assessment and assess knowledge base    Interventions:  - Provide teaching at level of understanding  - Provide teaching via preferred learning methods  Outcome: Progressing     Problem: DISCHARGE PLANNING  Goal: Discharge to home or other facility with appropriate resources  Description: INTERVENTIONS:  - Identify barriers to discharge w/patient and caregiver  - Arrange for needed discharge resources and transportation as appropriate  - Identify discharge learning needs (meds, wound care, etc )  - Arrange for interpretive services to assist at discharge as needed  - Refer to Case Management Department for coordinating discharge planning if the patient needs post-hospital services based on physician/advanced practitioner order or complex needs related to functional status, cognitive ability, or social support system  Outcome: Progressing

## 2023-03-14 NOTE — ANESTHESIA PREPROCEDURE EVALUATION
Procedure:   SECTION () (Uterus)    Relevant Problems   GYN   (+) 37 weeks gestation of pregnancy   (+) AMA (advanced maternal age) multigravida 35+, third trimester      HEMATOLOGY   (+) Anemia during pregnancy in third trimester        Physical Exam    Airway    Mallampati score: II         Dental   Comment: None loose, No notable dental hx     Cardiovascular      Pulmonary      Other Findings           Latest Reference Range & Units 23 13:03   WBC 4 31 - 10 16 Thousand/uL 9 25   Red Blood Cell Count 3 81 - 5 12 Million/uL 3 54 (L)   Hemoglobin 11 5 - 15 4 g/dL 11 6   HCT 34 8 - 46 1 % 34 5 (L)   MCV 82 - 98 fL 98   MCH 26 8 - 34 3 pg 32 8   MCHC 31 4 - 37 4 g/dL 33 6   RDW 11 6 - 15 1 % 12 7   Platelet Count 033 - 390 Thousands/uL 212   MPV 8 9 - 12 7 fL 11 6   nRBC /100 WBCs 0   (L): Data is abnormally low        Anesthesia Plan  ASA Score- 2     Anesthesia Type- spinal with ASA Monitors  Additional Monitors:   Airway Plan:     Comment: NPO after MN    + antibodies  Has 2 units of PRBC crossmatched  Plan Factors-Exercise tolerance (METS): >4 METS  Chart reviewed  Existing labs reviewed  Patient summary reviewed  Patient is not a current smoker  Induction-     Postoperative Plan-     Informed Consent- Anesthetic plan and risks discussed with patient  I personally reviewed this patient with the CRNA  Discussed and agreed on the Anesthesia Plan with the CRNA  Zelda Tellez

## 2023-03-14 NOTE — ANESTHESIA POSTPROCEDURE EVALUATION
Post-Op Assessment Note    CV Status:  Stable    Pain management: adequate     Mental Status:  Alert and awake   Hydration Status:  Euvolemic   PONV Controlled:  Controlled   Airway Patency:  Patent      Post Op Vitals Reviewed: Yes      Staff: CRNA, Anesthesiologist         No notable events documented      BP   121/67   Temp 97   Pulse 78   Resp 19   SpO2 94

## 2023-03-14 NOTE — LACTATION NOTE
This note was copied from a baby's chart  CONSULT - LACTATION  Baby Boy Doreen Anaya Gails 0 days male MRN: [id number]    [facility] NURSERY Room / Bed: (N)/(N) Encounter: [id number]    Maternal Information     MOTHER:  Tonia Romano  Maternal Age: 40 y o    OB History: # 1 - Date: 23, Sex: Male, Weight: 2700 g (5 lb 15 2 oz), GA: 37w0d, Delivery: , Low Vertical, Apgar1: 9, Apgar5: 9, Living: Living, Birth Comments: Neonatologist present in OR at birth   Previouse breast reduction surgery? No    Lactation history:   Has patient previously breast fed: No   How long had patient previously breast fed:     Previous breast feeding complications:       Past Surgical History:   Procedure Laterality Date   • WISDOM TOOTH EXTRACTION      7TH GRADE        Birth information:  YOB: 2022   Time of birth: 8:43 AM   Sex: male   Delivery type: , Low Vertical   Birth Weight: 2700 g (5 lb 15 2 oz)   Percent of Weight Change: 0%     Gestational Age: 37w0d   [unfilled]    Assessment     Breast and nipple assessment: normal apperance with large breast, slightly everted nipple with use of latch assist and laid back position     Assessment: normal assessment    Feeding assessment: feeding well  LATCH:  Latch: Grasps breast, tongue down, lips flanged, rhythmic sucking   Audible Swallowing: Spontaneous and intermittent (24 hours old)   Type of Nipple: Everted (After stimulation) (latch assist)   Comfort (Breast/Nipple): Soft/non-tender   Hold (Positioning): Partial assist, teach one side, mother does other, staff holds   LATCH Score: 9          Feeding recommendations:  breast feed on demand     Met with parents to discuss feeding plan  Mother is planning on breastfeeding  Mother discussed that baby latched with first feeding very well in football   Baby was cueing and brought to the breast, given expressed colostrum, that was copious and after attempting to find a good position, nipples were noted to be taut and flatter that everted slightly with gentle compression  Mother was given latch assist, where it stretched and helped baby to latch deeply in a cross cradle position and laid back  The Ready, Set, Baby Booklet was discussed  Discussed importance of skin to skin to help baby awaken for breastfeeding, to help with milk production as well as stabilize temperature, blood sugars, decrease pain, promote relaxation, and calm the baby as well as for bonding that father may do as well  Showed images of tummy size progression as milk production increases to meet the nutritional/growing needs of the baby and risks associated with introducing early supplementation that is not medically indicated  Discussed alternative feeding methods as a manner to provide baby with additional colostrum/breast milk if baby is sleepy and/or unable to breastfeed directly to help protect the milk supply and preserve latching abilities at the breast     Discussed “Second Night Syndrome” explaining how baby’s cluster feeds to meet growing needs  Growth spurts were explained and how cluster feeding helps boost milk supply  Explained feeding cues and fullness cues as well as importance of obtaining a deep latch for effective milk removal and proper positioning (tummy to tummy, at level, nose to nipple, bring chin to breast first and bringing baby to breast) with ear, shoulder, and hip alignment  Demonstrated on breast model how to hold, compress and perform hand expression  Addressed breast pump needs and mother discussed that she has a breast pump for home use  Parents were made aware of how to communicate with lactation and encouraged to reach out for continued support and/or questions that arise        Linnette Rodriguez RN 3/14/2023 12:04 PM

## 2023-03-15 LAB
ALBUMIN SERPL BCP-MCNC: 3 G/DL (ref 3.5–5)
ALP SERPL-CCNC: 85 U/L (ref 34–104)
ALT SERPL W P-5'-P-CCNC: 12 U/L (ref 7–52)
ANION GAP SERPL CALCULATED.3IONS-SCNC: 5 MMOL/L (ref 4–13)
AST SERPL W P-5'-P-CCNC: 21 U/L (ref 13–39)
BASOPHILS # BLD AUTO: 0.05 THOUSANDS/ÂΜL (ref 0–0.1)
BASOPHILS NFR BLD AUTO: 0 % (ref 0–1)
BILIRUB SERPL-MCNC: 0.43 MG/DL (ref 0.2–1)
BUN SERPL-MCNC: 12 MG/DL (ref 5–25)
CALCIUM ALBUM COR SERPL-MCNC: 9 MG/DL (ref 8.3–10.1)
CALCIUM SERPL-MCNC: 8.2 MG/DL (ref 8.4–10.2)
CHLORIDE SERPL-SCNC: 106 MMOL/L (ref 96–108)
CO2 SERPL-SCNC: 24 MMOL/L (ref 21–32)
CREAT SERPL-MCNC: 0.73 MG/DL (ref 0.6–1.3)
EOSINOPHIL # BLD AUTO: 0.15 THOUSAND/ÂΜL (ref 0–0.61)
EOSINOPHIL NFR BLD AUTO: 1 % (ref 0–6)
ERYTHROCYTE [DISTWIDTH] IN BLOOD BY AUTOMATED COUNT: 13 % (ref 11.6–15.1)
GFR SERPL CREATININE-BSD FRML MDRD: 106 ML/MIN/1.73SQ M
GLUCOSE SERPL-MCNC: 86 MG/DL (ref 65–140)
HCT VFR BLD AUTO: 30.2 % (ref 34.8–46.1)
HGB BLD-MCNC: 10.2 G/DL (ref 11.5–15.4)
IMM GRANULOCYTES # BLD AUTO: 0.04 THOUSAND/UL (ref 0–0.2)
IMM GRANULOCYTES NFR BLD AUTO: 0 % (ref 0–2)
LYMPHOCYTES # BLD AUTO: 1.7 THOUSANDS/ÂΜL (ref 0.6–4.47)
LYMPHOCYTES NFR BLD AUTO: 15 % (ref 14–44)
MCH RBC QN AUTO: 33 PG (ref 26.8–34.3)
MCHC RBC AUTO-ENTMCNC: 33.8 G/DL (ref 31.4–37.4)
MCV RBC AUTO: 98 FL (ref 82–98)
MONOCYTES # BLD AUTO: 0.92 THOUSAND/ÂΜL (ref 0.17–1.22)
MONOCYTES NFR BLD AUTO: 8 % (ref 4–12)
NEUTROPHILS # BLD AUTO: 8.53 THOUSANDS/ÂΜL (ref 1.85–7.62)
NEUTS SEG NFR BLD AUTO: 76 % (ref 43–75)
NRBC BLD AUTO-RTO: 0 /100 WBCS
PLATELET # BLD AUTO: 195 THOUSANDS/UL (ref 149–390)
PMV BLD AUTO: 11 FL (ref 8.9–12.7)
POTASSIUM SERPL-SCNC: 3.8 MMOL/L (ref 3.5–5.3)
PROT SERPL-MCNC: 5.4 G/DL (ref 6.4–8.4)
RBC # BLD AUTO: 3.09 MILLION/UL (ref 3.81–5.12)
SODIUM SERPL-SCNC: 135 MMOL/L (ref 135–147)
WBC # BLD AUTO: 11.39 THOUSAND/UL (ref 4.31–10.16)

## 2023-03-15 RX ADMIN — KETOROLAC TROMETHAMINE 30 MG: 30 INJECTION, SOLUTION INTRAMUSCULAR; INTRAVENOUS at 02:53

## 2023-03-15 RX ADMIN — IBUPROFEN 600 MG: 600 TABLET, FILM COATED ORAL at 23:13

## 2023-03-15 RX ADMIN — DOCUSATE SODIUM 100 MG: 100 CAPSULE, LIQUID FILLED ORAL at 10:01

## 2023-03-15 RX ADMIN — IBUPROFEN 600 MG: 600 TABLET, FILM COATED ORAL at 16:16

## 2023-03-15 NOTE — PLAN OF CARE
Problem: PAIN - ADULT  Goal: Verbalizes/displays adequate comfort level or baseline comfort level  Description: Interventions:  - Encourage patient to monitor pain and request assistance  - Assess pain using appropriate pain scale  - Administer analgesics based on type and severity of pain and evaluate response  - Implement non-pharmacological measures as appropriate and evaluate response  - Consider cultural and social influences on pain and pain management  - Notify physician/advanced practitioner if interventions unsuccessful or patient reports new pain  Outcome: Progressing     Problem: INFECTION - ADULT  Goal: Absence or prevention of progression during hospitalization  Description: INTERVENTIONS:  - Assess and monitor for signs and symptoms of infection  - Monitor lab/diagnostic results  - Monitor all insertion sites, i e  indwelling lines, tubes, and drains  - Monitor endotracheal if appropriate and nasal secretions for changes in amount and color  - [place] appropriate cooling/warming therapies per order  - Administer medications as ordered  - Instruct and encourage patient and family to use good hand hygiene technique  - Identify and instruct in appropriate isolation precautions for identified infection/condition  Outcome: Progressing  Goal: Absence of fever/infection during neutropenic period  Description: INTERVENTIONS:  - Monitor WBC    Outcome: Progressing     Problem: SAFETY ADULT  Goal: Patient will remain free of falls  Description: INTERVENTIONS:  - Educate patient/family on patient safety including physical limitations  - Instruct patient to call for assistance with activity   - Consult OT/PT to assist with strengthening/mobility   - Keep Call bell within reach  - Keep bed low and locked with side rails adjusted as appropriate  - Keep care items and personal belongings within reach  - Initiate and maintain comfort rounds  - Make Fall Risk Sign visible to staff    - Apply yellow socks and bracelet for high fall risk patients  - Consider moving patient to room near nurses station  Outcome: Progressing  Goal: Maintain or return to baseline ADL function  Description: INTERVENTIONS:  -  Assess patient's ability to carry out ADLs; assess patient's baseline for ADL function and identify physical deficits which impact ability to perform ADLs (bathing, care of mouth/teeth, toileting, grooming, dressing, etc )  - Assess/evaluate cause of self-care deficits   - Assess range of motion  - Assess patient's mobility; develop plan if impaired  - Assess patient's need for assistive devices and provide as appropriate  - Encourage maximum independence but intervene and supervise when necessary  - Involve family in performance of ADLs  - Assess for home care needs following discharge   - Consider OT consult to assist with ADL evaluation and planning for discharge  - Provide patient education as appropriate  Outcome: Progressing  Goal: Maintains/Returns to pre admission functional level  Description: INTERVENTIONS:  - Perform BMAT or MOVE assessment daily    - Set and communicate daily mobility goal to care team and patient/family/caregiver  - Collaborate with rehabilitation services on mobility goals if consulted  - Out of bed for toileting  - Record patient progress and toleration of activity level   Outcome: Progressing     Problem: Knowledge Deficit  Goal: Patient/family/caregiver demonstrates understanding of disease process, treatment plan, medications, and discharge instructions  Description: Complete learning assessment and assess knowledge base    Interventions:  - Provide teaching at level of understanding  - Provide teaching via preferred learning methods  Outcome: Progressing     Problem: DISCHARGE PLANNING  Goal: Discharge to home or other facility with appropriate resources  Description: INTERVENTIONS:  - Identify barriers to discharge w/patient and caregiver  - Arrange for needed discharge resources and transportation as appropriate  - Identify discharge learning needs (meds, wound care, etc )  - Arrange for interpretive services to assist at discharge as needed  - Refer to Case Management Department for coordinating discharge planning if the patient needs post-hospital services based on physician/advanced practitioner order or complex needs related to functional status, cognitive ability, or social support system  Outcome: Progressing

## 2023-03-15 NOTE — LACTATION NOTE
This note was copied from a baby's chart  Met with mother to follow up from yesterday's encounter  Mother discussed that she has been latching well and feeling more comfortable with positioning/latching  She has not had to use the latch assist, but has it at the bedside as needed  Baby is currently less than 1% in weight loss, having wet and dirty diapers and 24 hour bilirubin was collected  Mother was encouraged to call for further support and/or questions that arise

## 2023-03-15 NOTE — PROGRESS NOTES
Progress Note - OB/GYN  Post-Partum Physician Note   Chelle Leblanc 40 y o  female MRN: [id number]  Unit/Bed#: -01 Encounter: [id number]    Patient is postop and postpartum day 1 from a  (placenta previa) with spinal anesthesia      Subjective:   Pain: no  Tolerating Oral Intake:2 yes  Voiding: yes  Flatus: no  Bowel Movement: no  Ambulating: yes  Breastfeeding: Breastfeeding  Chest Pain: no  Shortness of Breath: no  Leg Pain/Discomfort: no  Lochia: minimal  Other:     Objective:   Vitals:    23 1900 23 2304 03/15/23 0300 03/15/23 0800   BP: 130/75 137/81 139/89 142/78   BP Location: Right arm Right arm Left arm Right arm   Pulse: 66 64 71 75   Resp: 18 18  16   Temp: 97 8 °F (36 6 °C) 98 1 °F (36 7 °C) 98 2 °F (36 8 °C) 98 3 °F (36 8 °C)   TempSrc: Oral Oral Oral Oral   SpO2: 98% 98% 97% 98%   Weight:       Height:           Intake/Output Summary (Last 24 hours) at 3/15/2023 3773  Last data filed at 3/14/2023 2330  Gross per 24 hour   Intake --   Output 2577 ml   Net -2577 ml       Physical Exam:  General: in no apparent distress, well developed and well nourished, non-toxic, in no respiratory distress and acyanotic, alert, oriented times 3, afebrile and normal vitals  Abdomen: abdomen is soft without significant tenderness, masses, organomegaly or guarding  Fundus: Firm, 1 below the umbilicus  Incision: C/D/I  Lower extremeties: nontender  Other:     Labs/Tests:   Recent Results (from the past 24 hour(s))   Prepare Leukoreduced RBC: 2 Units    Collection Time: 23  9:37 AM   Result Value Ref Range    Unit Product Code N7703Q49     Unit Number [id number]     Unit ABO O     Unit RH NEG     Crossmatch Compatible     Unit Dispense Status Return to [facility]     Unit Product Volume 350 ml    Unit Product Code U3225I46     Unit Number [id number]     Unit ABO O     Unit RH NEG     Crossmatch Compatible     Unit Dispense Status Return to [facility]     Unit Product Volume 350 ml Postpartum Rhogam    Collection Time: 03/14/23  1:10 PM   Result Value Ref Range    ABO Grouping O     Rh Factor Negative     Antibody Screen Positive     Fetal Bleed Screen Negative    CBC and differential    Collection Time: 03/15/23  8:09 AM   Result Value Ref Range    WBC 11 39 (H) 4 31 - 10 16 Thousand/uL    RBC 3 09 (L) 3 81 - 5 12 Million/uL    Hemoglobin 10 2 (L) 11 5 - 15 4 g/dL    Hematocrit 30 2 (L) 34 8 - 46 1 %    MCV 98 82 - 98 fL    MCH 33 0 26 8 - 34 3 pg    MCHC 33 8 31 4 - 37 4 g/dL    RDW 13 0 11 6 - 15 1 %    MPV 11 0 8 9 - 12 7 fL    Platelets 156 489 - 546 Thousands/uL    nRBC 0 /100 WBCs    Neutrophils Relative 76 (H) 43 - 75 %    Immat GRANS % 0 0 - 2 %    Lymphocytes Relative 15 14 - 44 %    Monocytes Relative 8 4 - 12 %    Eosinophils Relative 1 0 - 6 %    Basophils Relative 0 0 - 1 %    Neutrophils Absolute 8 53 (H) 1 85 - 7 62 Thousands/µL    Immature Grans Absolute 0 04 0 00 - 0 20 Thousand/uL    Lymphocytes Absolute 1 70 0 60 - 4 47 Thousands/µL    Monocytes Absolute 0 92 0 17 - 1 22 Thousand/µL    Eosinophils Absolute 0 15 0 00 - 0 61 Thousand/µL    Basophils Absolute 0 05 0 00 - 0 10 Thousands/µL   Comprehensive metabolic panel    Collection Time: 03/15/23  8:09 AM   Result Value Ref Range    Sodium 135 135 - 147 mmol/L    Potassium 3 8 3 5 - 5 3 mmol/L    Chloride 106 96 - 108 mmol/L    CO2 24 21 - 32 mmol/L    ANION GAP 5 4 - 13 mmol/L    BUN 12 5 - 25 mg/dL    Creatinine 0 73 0 60 - 1 30 mg/dL    Glucose 86 65 - 140 mg/dL    Calcium 8 2 (L) 8 4 - 10 2 mg/dL    Corrected Calcium 9 0 8 3 - 10 1 mg/dL    AST 21 13 - 39 U/L    ALT 12 7 - 52 U/L    Alkaline Phosphatase 85 34 - 104 U/L    Total Protein 5 4 (L) 6 4 - 8 4 g/dL    Albumin 3 0 (L) 3 5 - 5 0 g/dL    Total Bilirubin 0 43 0 20 - 1 00 mg/dL    eGFR 106 ml/min/1 73sq m         Brief OB Lab review:  ABO Grouping   Date Value Ref Range Status   03/14/2023 O  Final      Rh Factor   Date Value Ref Range Status   03/14/2023 Negative Final     Rh Type   Date Value Ref Range Status   2023 Negative  Final     Comment:     Please note: Prior records for this patient's ABO / Rh type are not  available for additional verification  No results found for: ANTIBODYSCR  No results found for: RUBM    MEDS:   Current Facility-Administered Medications   Medication Dose Route Frequency   • acetaminophen (TYLENOL) tablet 650 mg  650 mg Oral Q4H PRN   • calcium carbonate (TUMS) chewable tablet 1,000 mg  1,000 mg Oral TID PRN   • diphenhydrAMINE (BENADRYL) injection 25 mg  25 mg Intravenous Q6H PRN   • docusate sodium (COLACE) capsule 100 mg  100 mg Oral BID PRN   • fentaNYL (SUBLIMAZE) injection 25 mcg  25 mcg Intravenous Q5 Min PRN   • ibuprofen (MOTRIN) tablet 600 mg  600 mg Oral Q6H   • lactated ringers infusion  125 mL/hr Intravenous Continuous   • metoclopramide (REGLAN) injection 10 mg  10 mg Intravenous Q6H PRN   • nalbuphine (NUBAIN) injection 5 mg  5 mg Intravenous Q3H PRN   • ondansetron (ZOFRAN) injection 4 mg  4 mg Intravenous Once PRN   • ondansetron (ZOFRAN) injection 4 mg  4 mg Intravenous Q8H PRN   • oxyCODONE (ROXICODONE) IR tablet 10 mg  10 mg Oral Q4H PRN   • oxyCODONE (ROXICODONE) IR tablet 5 mg  5 mg Oral Q4H PRN   • simethicone (MYLICON) chewable tablet 80 mg  80 mg Oral 4x Daily PRN     Invasive Devices     Peripheral Intravenous Line  Duration           Peripheral IV 23 Dorsal (posterior); Left Forearm 1 day                Assessment and Plan:  40y o  year-old , postpartum day 1 status-post  , Low Transverse    Continue routine postpartum care  Encourage ambulation  Advance diet as tolerated      Twin pregnancy with single intrauterine death, first trimester, fetus 1  - Will send placenta to pathology    Rh negative state in antepartum period  - Plan for routine Rhogam workup postpartum  Placenta previa, third trimester  - Proceed with primary  delivery as scheduled at 37 weeks     - Will crossmatch x 2 units PRBC due to increased risk of hemorrhage   - Plan for administration of tranexamic acid 1000 mg IV at time of cord clamping  Anemia during pregnancy in third trimester  - Has been on oral iron supplementation in 3rd trimester    - Will crossmatch x 2 units PRBC due to increased risk of hemorrhage with placenta previa         Alok Morelos MD

## 2023-03-15 NOTE — PLAN OF CARE
Problem: PAIN - ADULT  Goal: Verbalizes/displays adequate comfort level or baseline comfort level  Description: Interventions:  - Encourage patient to monitor pain and request assistance  - Assess pain using appropriate pain scale  - Administer analgesics based on type and severity of pain and evaluate response  - Implement non-pharmacological measures as appropriate and evaluate response  - Consider cultural and social influences on pain and pain management  - Notify physician/advanced practitioner if interventions unsuccessful or patient reports new pain  Outcome: Progressing     Problem: INFECTION - ADULT  Goal: Absence or prevention of progression during hospitalization  Description: INTERVENTIONS:  - Assess and monitor for signs and symptoms of infection  - Monitor lab/diagnostic results  - Monitor all insertion sites, i e  indwelling lines, tubes, and drains  - Monitor endotracheal if appropriate and nasal secretions for changes in amount and color  - [place] appropriate cooling/warming therapies per order  - Administer medications as ordered  - Instruct and encourage patient and family to use good hand hygiene technique  - Identify and instruct in appropriate isolation precautions for identified infection/condition  Outcome: Progressing  Goal: Absence of fever/infection during neutropenic period  Description: INTERVENTIONS:  - Monitor WBC    Outcome: Progressing     Problem: SAFETY ADULT  Goal: Patient will remain free of falls  Description: INTERVENTIONS:  - Educate patient/family on patient safety including physical limitations  - Instruct patient to call for assistance with activity   - Consult OT/PT to assist with strengthening/mobility   - Keep Call bell within reach  - Keep bed low and locked with side rails adjusted as appropriate  - Keep care items and personal belongings within reach  - Initiate and maintain comfort rounds  - Make Fall Risk Sign visible to staff  - Offer Toileting every  Hours, in advance of need  - Initiate/Maintain alarm  - Obtain necessary fall risk management equipment:   - Apply yellow socks and bracelet for high fall risk patients  - Consider moving patient to room near nurses station  Outcome: Progressing  Goal: Maintain or return to baseline ADL function  Description: INTERVENTIONS:  -  Assess patient's ability to carry out ADLs; assess patient's baseline for ADL function and identify physical deficits which impact ability to perform ADLs (bathing, care of mouth/teeth, toileting, grooming, dressing, etc )  - Assess/evaluate cause of self-care deficits   - Assess range of motion  - Assess patient's mobility; develop plan if impaired  - Assess patient's need for assistive devices and provide as appropriate  - Encourage maximum independence but intervene and supervise when necessary  - Involve family in performance of ADLs  - Assess for home care needs following discharge   - Consider OT consult to assist with ADL evaluation and planning for discharge  - Provide patient education as appropriate  Outcome: Progressing  Goal: Maintains/Returns to pre admission functional level  Description: INTERVENTIONS:  - Perform BMAT or MOVE assessment daily    - Set and communicate daily mobility goal to care team and patient/family/caregiver  - Collaborate with rehabilitation services on mobility goals if consulted  - Perform Range of Motion  times a day  - Reposition patient every  hours    - Dangle patient  times a day  - Stand patient  times a day  - Ambulate patient  times a day  - Out of bed to chair  times a day   - Out of bed for meals  times a day  - Out of bed for toileting  - Record patient progress and toleration of activity level   Outcome: Progressing     Problem: Knowledge Deficit  Goal: Patient/family/caregiver demonstrates understanding of disease process, treatment plan, medications, and discharge instructions  Description: Complete learning assessment and assess knowledge base   Interventions:  - Provide teaching at level of understanding  - Provide teaching via preferred learning methods  Outcome: Progressing     Problem: DISCHARGE PLANNING  Goal: Discharge to home or other facility with appropriate resources  Description: INTERVENTIONS:  - Identify barriers to discharge w/patient and caregiver  - Arrange for needed discharge resources and transportation as appropriate  - Identify discharge learning needs (meds, wound care, etc )  - Arrange for interpretive services to assist at discharge as needed  - Refer to Case Management Department for coordinating discharge planning if the patient needs post-hospital services based on physician/advanced practitioner order or complex needs related to functional status, cognitive ability, or social support system  Outcome: Progressing

## 2023-03-16 VITALS
TEMPERATURE: 97.9 F | SYSTOLIC BLOOD PRESSURE: 138 MMHG | HEIGHT: 72 IN | DIASTOLIC BLOOD PRESSURE: 59 MMHG | HEART RATE: 75 BPM | WEIGHT: 211.4 LBS | OXYGEN SATURATION: 99 % | RESPIRATION RATE: 16 BRPM | BODY MASS INDEX: 28.63 KG/M2

## 2023-03-16 RX ORDER — ACETAMINOPHEN 325 MG/1
650 TABLET ORAL EVERY 6 HOURS PRN
Refills: 0
Start: 2023-03-16

## 2023-03-16 RX ORDER — IBUPROFEN 600 MG/1
600 TABLET ORAL EVERY 6 HOURS
Refills: 0
Start: 2023-03-16

## 2023-03-16 RX ADMIN — DOCUSATE SODIUM 100 MG: 100 CAPSULE, LIQUID FILLED ORAL at 09:17

## 2023-03-16 RX ADMIN — IBUPROFEN 600 MG: 600 TABLET, FILM COATED ORAL at 09:17

## 2023-03-16 NOTE — DISCHARGE INSTR - AVS FIRST PAGE
HIGH BLOOD PRESSURES IN AND AROUND PREGNANCY    WHAT YOU NEED TO KNOW:   Hypertension is high blood pressure (BP)  Hypertension during pregnancy is a BP of 140/90 or higher  Severe hypertension is at least 160/110  One or both numbers of these readings may be high  Hypertension may start before you become pregnant, or develop during pregnancy  Pregnancy can cause high BP, or it may develop because of other risk factors you had before you became pregnant  It is important to get screened and treated for an elevated BP or hypertension during pregnancy  This can prevent problems for you and your baby  Some people with high blood pressure in pregnancy can develop Preeclampsia, which is a condition specific to pregnancy  Preeclampsia is high blood pressure (BP) that usually develops after week 20 of pregnancy  It can also develop days to weeks after delivery, even if you did not have high BP during pregnancy  When it develops after delivery, it may also be called postpartum preeclampsia  Preeclampsia causes your BP to be 140/90 or higher  You may also have protein in your urine or damage to organs such as your kidneys or liver  This is diagnosed with urine and blood testing  It can also lead to eclampsia, a condition that causes seizures from high BP  You have been diagnosed with gestational hypertension  When should I call my doctor? You develop a severe headache that does not go away with Tylenol or rest      You have new or increased vision changes, such as blurred or spotted vision  You have severe abdominal pain with or without nausea and vomiting  You have shortness of breath or chest pain  If you were told to check your blood pressure at home - call your doctor if your blood pressure is higher than 160/110  To check blood pressure at home - Sit and rest for 5 minutes before you take your BP  Extend your arm and support it on a flat surface   Your arm should be at the same level as your heart  Follow the directions that came with your BP monitor  Take your BP as often as directed  Keep a record of your BP readings and bring it to your follow-up visits  You have questions or concerns about your condition or care

## 2023-03-16 NOTE — DISCHARGE SUMMARY
Discharge Summary - OB/GYN   Kamila Mojica 40 y o  female MRN: [id number]  Unit/Bed#: -01 Encounter: [id number]      Admission Date: 3/14/2023     Discharge Date: 23    Principal Diagnosis: Placenta Previs,  Pregnancy at 37w0d    Secondary Diagnosis: gestational HTN    Procedures: primary  section, low transverse incision    Attending - Delivery: Jenna Laurent MD         - Discharge: Chad Mccann MD    Hospital Course:     Kamila Mojica is a 40 y o   at 37w0d wks who was initially admitted for primary LTCS for placenta previa  She delivered a viable male  with weight 5lbs 15 2oz via primary  section, low transverse incision  Apgars were 9 (1 min) and 9 (5 min)   went to  nursery  Patient tolerated the procedure well and was transferred to recovery in stable condition  Her postpartum course was uncomplicated  Preoperative hemoglobin was 11 6g/dl, postoperative was 10 2g/dl  Her postoperative pain was well controlled with oral analgesics  On day of discharge, she was ambulating and able to reasonably perform all ADLs  She was voiding and had appropriate bowel function  Pain was well controlled  She was discharged home on post-operative day #2, per patient request without complications  Patient was instructed to follow up with her OB as an outpatient and was given appropriate warnings to call provider if she develops signs of infection or uncontrolled pain  Preeclampsia precautions were reviewed and patient is to return to office in 1 week for BP check  Condition at discharge: good     Discharge instructions/Information to patient and family:   See after visit summary for information provided to patient and family  Provisions for Follow-Up Care:  See after visit summary for information related to follow-up care and any pertinent home health orders        Disposition: Home    Planned Readmission: No    Discharge Medications: For a complete list of the patient's medications, please refer to her med rec      Chad Mccann MD

## 2023-03-16 NOTE — PLAN OF CARE
Problem: PAIN - ADULT  Goal: Verbalizes/displays adequate comfort level or baseline comfort level  Description: Interventions:  - Encourage patient to monitor pain and request assistance  - Assess pain using appropriate pain scale  - Administer analgesics based on type and severity of pain and evaluate response  - Implement non-pharmacological measures as appropriate and evaluate response  - Consider cultural and social influences on pain and pain management  - Notify physician/advanced practitioner if interventions unsuccessful or patient reports new pain  Outcome: Progressing     Problem: INFECTION - ADULT  Goal: Absence or prevention of progression during hospitalization  Description: INTERVENTIONS:  - Assess and monitor for signs and symptoms of infection  - Monitor lab/diagnostic results  - Monitor all insertion sites, i e  indwelling lines, tubes, and drains  - Monitor endotracheal if appropriate and nasal secretions for changes in amount and color  - [place] appropriate cooling/warming therapies per order  - Administer medications as ordered  - Instruct and encourage patient and family to use good hand hygiene technique  - Identify and instruct in appropriate isolation precautions for identified infection/condition  Outcome: Progressing  Goal: Absence of fever/infection during neutropenic period  Description: INTERVENTIONS:  - Monitor WBC    Outcome: Progressing     Problem: SAFETY ADULT  Goal: Patient will remain free of falls  Description: INTERVENTIONS:  - Educate patient/family on patient safety including physical limitations  - Instruct patient to call for assistance with activity   - Consult OT/PT to assist with strengthening/mobility   - Keep Call bell within reach  - Keep bed low and locked with side rails adjusted as appropriate  - Keep care items and personal belongings within reach  - Initiate and maintain comfort rounds  - Make Fall Risk Sign visible to staff  - Apply yellow socks and bracelet for high fall risk patients  - Consider moving patient to room near nurses station  Outcome: Progressing  Goal: Maintain or return to baseline ADL function  Description: INTERVENTIONS:  -  Assess patient's ability to carry out ADLs; assess patient's baseline for ADL function and identify physical deficits which impact ability to perform ADLs (bathing, care of mouth/teeth, toileting, grooming, dressing, etc )  - Assess/evaluate cause of self-care deficits   - Assess range of motion  - Assess patient's mobility; develop plan if impaired  - Assess patient's need for assistive devices and provide as appropriate  - Encourage maximum independence but intervene and supervise when necessary  - Involve family in performance of ADLs  - Assess for home care needs following discharge   - Consider OT consult to assist with ADL evaluation and planning for discharge  - Provide patient education as appropriate  Outcome: Progressing  Goal: Maintains/Returns to pre admission functional level  Description: INTERVENTIONS:  - Perform BMAT or MOVE assessment daily    - Set and communicate daily mobility goal to care team and patient/family/caregiver  - Collaborate with rehabilitation services on mobility goals if consulted  - Out of bed for toileting  - Record patient progress and toleration of activity level   Outcome: Progressing     Problem: Knowledge Deficit  Goal: Patient/family/caregiver demonstrates understanding of disease process, treatment plan, medications, and discharge instructions  Description: Complete learning assessment and assess knowledge base    Interventions:  - Provide teaching at level of understanding  - Provide teaching via preferred learning methods  Outcome: Progressing     Problem: DISCHARGE PLANNING  Goal: Discharge to home or other facility with appropriate resources  Description: INTERVENTIONS:  - Identify barriers to discharge w/patient and caregiver  - Arrange for needed discharge resources and transportation as appropriate  - Identify discharge learning needs (meds, wound care, etc )  - Arrange for interpretive services to assist at discharge as needed  - Refer to Case Management Department for coordinating discharge planning if the patient needs post-hospital services based on physician/advanced practitioner order or complex needs related to functional status, cognitive ability, or social support system  Outcome: Progressing     Problem: POSTPARTUM  Goal: Experiences normal postpartum course  Description: INTERVENTIONS:  - Monitor maternal vital signs  - Assess uterine involution and lochia  Outcome: Progressing  Goal: Appropriate maternal -  bonding  Description: INTERVENTIONS:  - Identify family support  - Assess for appropriate maternal/infant bonding   -Encourage maternal/infant bonding opportunities  - Referral to  or  as needed  Outcome: Progressing  Goal: Establishment of infant feeding pattern  Description: INTERVENTIONS:  - Assess breast/bottle feeding  - Refer to lactation as needed  Outcome: Progressing  Goal: Incision(s), wounds(s) or drain site(s) healing without S/S of infection  Description: INTERVENTIONS  - Assess and document dressing, incision, wound bed, drain sites and surrounding tissue  - Provide patient and family education  Outcome: Progressing

## 2023-03-16 NOTE — PLAN OF CARE
Problem: PAIN - ADULT  Goal: Verbalizes/displays adequate comfort level or baseline comfort level  Description: Interventions:  - Encourage patient to monitor pain and request assistance  - Assess pain using appropriate pain scale  - Administer analgesics based on type and severity of pain and evaluate response  - Implement non-pharmacological measures as appropriate and evaluate response  - Consider cultural and social influences on pain and pain management  - Notify physician/advanced practitioner if interventions unsuccessful or patient reports new pain  Outcome: Progressing     Problem: INFECTION - ADULT  Goal: Absence or prevention of progression during hospitalization  Description: INTERVENTIONS:  - Assess and monitor for signs and symptoms of infection  - Monitor lab/diagnostic results  - Monitor all insertion sites, i e  indwelling lines, tubes, and drains  - Monitor endotracheal if appropriate and nasal secretions for changes in amount and color  - [place] appropriate cooling/warming therapies per order  - Administer medications as ordered  - Instruct and encourage patient and family to use good hand hygiene technique  - Identify and instruct in appropriate isolation precautions for identified infection/condition  Outcome: Progressing  Goal: Absence of fever/infection during neutropenic period  Description: INTERVENTIONS:  - Monitor WBC    Outcome: Progressing     Problem: SAFETY ADULT  Goal: Patient will remain free of falls  Description: INTERVENTIONS:  - Educate patient/family on patient safety including physical limitations  - Instruct patient to call for assistance with activity   - Consult OT/PT to assist with strengthening/mobility   - Keep Call bell within reach  - Keep bed low and locked with side rails adjusted as appropriate  - Keep care items and personal belongings within reach  - Initiate and maintain comfort rounds  - Make Fall Risk Sign visible to staff  - Apply yellow socks and bracelet for high fall risk patients  - Consider moving patient to room near nurses station  Outcome: Progressing  Goal: Maintain or return to baseline ADL function  Description: INTERVENTIONS:  -  Assess patient's ability to carry out ADLs; assess patient's baseline for ADL function and identify physical deficits which impact ability to perform ADLs (bathing, care of mouth/teeth, toileting, grooming, dressing, etc )  - Assess/evaluate cause of self-care deficits   - Assess range of motion  - Assess patient's mobility; develop plan if impaired  - Assess patient's need for assistive devices and provide as appropriate  - Encourage maximum independence but intervene and supervise when necessary  - Involve family in performance of ADLs  - Assess for home care needs following discharge   - Consider OT consult to assist with ADL evaluation and planning for discharge  - Provide patient education as appropriate  Outcome: Progressing  Goal: Maintains/Returns to pre admission functional level  Description: INTERVENTIONS:  - Perform BMAT or MOVE assessment daily    - Set and communicate daily mobility goal to care team and patient/family/caregiver  - Collaborate with rehabilitation services on mobility goals if consulted  - Out of bed for toileting  - Record patient progress and toleration of activity level   Outcome: Progressing     Problem: Knowledge Deficit  Goal: Patient/family/caregiver demonstrates understanding of disease process, treatment plan, medications, and discharge instructions  Description: Complete learning assessment and assess knowledge base    Interventions:  - Provide teaching at level of understanding  - Provide teaching via preferred learning methods  Outcome: Progressing     Problem: DISCHARGE PLANNING  Goal: Discharge to home or other facility with appropriate resources  Description: INTERVENTIONS:  - Identify barriers to discharge w/patient and caregiver  - Arrange for needed discharge resources and transportation as appropriate  - Identify discharge learning needs (meds, wound care, etc )  - Arrange for interpretive services to assist at discharge as needed  - Refer to Case Management Department for coordinating discharge planning if the patient needs post-hospital services based on physician/advanced practitioner order or complex needs related to functional status, cognitive ability, or social support system  Outcome: Progressing     Problem: POSTPARTUM  Goal: Experiences normal postpartum course  Description: INTERVENTIONS:  - Monitor maternal vital signs  - Assess uterine involution and lochia  Outcome: Progressing  Goal: Appropriate maternal -  bonding  Description: INTERVENTIONS:  - Identify family support  - Assess for appropriate maternal/infant bonding   -Encourage maternal/infant bonding opportunities  - Referral to  or  as needed  Outcome: Progressing  Goal: Establishment of infant feeding pattern  Description: INTERVENTIONS:  - Assess breast/bottle feeding  - Refer to lactation as needed  Outcome: Progressing  Goal: Incision(s), wounds(s) or drain site(s) healing without S/S of infection  Description: INTERVENTIONS  - Assess and document dressing, incision, wound bed, drain sites and surrounding tissue  - Provide patient and family education  - Perform skin care/dressing changes every 8 hours  Outcome: Progressing

## 2023-03-16 NOTE — ASSESSMENT & PLAN NOTE
- mildly elevated BP on admission for   Labs normal   No s/s preeclampsia   - Post partum occasionally elevated BP in mild range  Labs repeated 3/15/2023 and platelets, liver function and creatinine normal   No symptoms preeclampsia  - BP normal this morning      - will d/c home with preeclampsia precautions and have return to office in 1 week for BP check

## 2023-03-16 NOTE — PLAN OF CARE
Problem: PAIN - ADULT  Goal: Verbalizes/displays adequate comfort level or baseline comfort level  Description: Interventions:  - Encourage patient to monitor pain and request assistance  - Assess pain using appropriate pain scale  - Administer analgesics based on type and severity of pain and evaluate response  - Implement non-pharmacological measures as appropriate and evaluate response  - Consider cultural and social influences on pain and pain management  - Notify physician/advanced practitioner if interventions unsuccessful or patient reports new pain  Outcome: Completed     Problem: INFECTION - ADULT  Goal: Absence or prevention of progression during hospitalization  Description: INTERVENTIONS:  - Assess and monitor for signs and symptoms of infection  - Monitor lab/diagnostic results  - Monitor all insertion sites, i e  indwelling lines, tubes, and drains  - Monitor endotracheal if appropriate and nasal secretions for changes in amount and color  - [place] appropriate cooling/warming therapies per order  - Administer medications as ordered  - Instruct and encourage patient and family to use good hand hygiene technique  - Identify and instruct in appropriate isolation precautions for identified infection/condition  Outcome: Completed  Goal: Absence of fever/infection during neutropenic period  Description: INTERVENTIONS:  - Monitor WBC    Outcome: Completed     Problem: SAFETY ADULT  Goal: Patient will remain free of falls  Description: INTERVENTIONS:  - Educate patient/family on patient safety including physical limitations  - Instruct patient to call for assistance with activity   - Consult OT/PT to assist with strengthening/mobility   - Keep Call bell within reach  - Keep bed low and locked with side rails adjusted as appropriate  - Keep care items and personal belongings within reach  - Initiate and maintain comfort rounds  - Make Fall Risk Sign visible to staff  - Apply yellow socks and bracelet for high fall risk patients  - Consider moving patient to room near nurses station  Outcome: Completed  Goal: Maintain or return to baseline ADL function  Description: INTERVENTIONS:  -  Assess patient's ability to carry out ADLs; assess patient's baseline for ADL function and identify physical deficits which impact ability to perform ADLs (bathing, care of mouth/teeth, toileting, grooming, dressing, etc )  - Assess/evaluate cause of self-care deficits   - Assess range of motion  - Assess patient's mobility; develop plan if impaired  - Assess patient's need for assistive devices and provide as appropriate  - Encourage maximum independence but intervene and supervise when necessary  - Involve family in performance of ADLs  - Assess for home care needs following discharge   - Consider OT consult to assist with ADL evaluation and planning for discharge  - Provide patient education as appropriate  Outcome: Completed  Goal: Maintains/Returns to pre admission functional level  Description: INTERVENTIONS:  - Perform BMAT or MOVE assessment daily    - Set and communicate daily mobility goal to care team and patient/family/caregiver  - Collaborate with rehabilitation services on mobility goals if consulted  - Out of bed for toileting  - Record patient progress and toleration of activity level   Outcome: Completed     Problem: Knowledge Deficit  Goal: Patient/family/caregiver demonstrates understanding of disease process, treatment plan, medications, and discharge instructions  Description: Complete learning assessment and assess knowledge base    Interventions:  - Provide teaching at level of understanding  - Provide teaching via preferred learning methods  Outcome: Completed     Problem: DISCHARGE PLANNING  Goal: Discharge to home or other facility with appropriate resources  Description: INTERVENTIONS:  - Identify barriers to discharge w/patient and caregiver  - Arrange for needed discharge resources and transportation as appropriate  - Identify discharge learning needs (meds, wound care, etc )  - Arrange for interpretive services to assist at discharge as needed  - Refer to Case Management Department for coordinating discharge planning if the patient needs post-hospital services based on physician/advanced practitioner order or complex needs related to functional status, cognitive ability, or social support system  Outcome: Completed     Problem: POSTPARTUM  Goal: Experiences normal postpartum course  Description: INTERVENTIONS:  - Monitor maternal vital signs  - Assess uterine involution and lochia  Outcome: Completed  Goal: Appropriate maternal -  bonding  Description: INTERVENTIONS:  - Identify family support  - Assess for appropriate maternal/infant bonding   -Encourage maternal/infant bonding opportunities  - Referral to  or  as needed  Outcome: Completed  Goal: Establishment of infant feeding pattern  Description: INTERVENTIONS:  - Assess breast/bottle feeding  - Refer to lactation as needed  Outcome: Completed  Goal: Incision(s), wounds(s) or drain site(s) healing without S/S of infection  Description: INTERVENTIONS  - Assess and document dressing, incision, wound bed, drain sites and surrounding tissue  - Provide patient and family education  Outcome: Completed

## 2023-03-16 NOTE — UTILIZATION REVIEW
NOTIFICATION OF INPATIENT ADMISSION   MATERNITY/DELIVERY AUTHORIZATION REQUEST   SERVICING FACILITY:   [facility] - L&D, [place], María Elena Arabia Tyrese Horne, [address]  Tax ID: [id number]  NPI: [id number] ATTENDING PROVIDER:  Attending Name and NPI#: Floresita Rosas Md [[id number]]  Address: [address], [address]  Phone: [phone]     ADMISSION INFORMATION:  Place of Service: [place] [address]  Place of Service Code: 21  Inpatient Admission Date/Time: 3/14/23  6:14 AM  Discharge Date/Time: 3/16/2023  1:30 PM  Admitting Diagnosis Code/Description:  No admission diagnoses are documented for this encounter  Mother: Brenda Pratt 1985 Estimated Date of Delivery: 23  Delivering clinician: Floresita Rosas    OB History        1    Para   1    Term   1       0    AB   0    Living   1       SAB   0    IAB   0    Ectopic   0    Multiple   0    Live Births   1                Name & MRN:   Information for the patient's :  Pat Bump Boy Ira Naomie) [[id number]]     [place] Delivery Information:  Sex: male  Delivered 3/14/2023 8:43 AM by , Low Transverse; Gestational Age: 41w0d     Measurements:  Weight: 5 lb 15 2 oz (2700 g); Height: 18 5"    APGAR 1 minute 5 minutes 10 minutes   Totals: 9 9      [place] Birth Information: 40 y o  female MRN: [id number] Unit/Bed#: -01   Birthweight: No birth weight on file  Gestational Age: <None> Delivery Type:    APGARS Totals:        UTILIZATION REVIEW CONTACT:  [place] Utilization   Network Utilization Review Department  Phone: [phone]  Fax [phone]  Email: [email or web address]  Contact for approvals/pending authorizations, clinical reviews, and discharge       PHYSICIAN ADVISORY SERVICES:  Medical Necessity Denial & Fiia-ot-Qyxr Review  Phone: [phone]  Fax: [phone]  Email: [email or web address]         NOTIFICATION OF ADMISSION DISCHARGE   This is a Notification of Discharge from [address]  Please be advised that this patient has been discharge from our facility  Below you will find the admission and discharge date and time including the patient’s disposition  UTILIZATION REVIEW CONTACT:  Ankush Herron  Utilization   Network Utilization Review Department  Phone: [phone] x carefully listen to the prompts  All voicemails are confidential   Email: [email or web address]     ADMISSION INFORMATION  PRESENTATION DATE: 3/14/2023  6:10 AM  OBERVATION ADMISSION DATE:   INPATIENT ADMISSION DATE: 3/14/23  6:14 AM   DISCHARGE DATE: 3/16/2023  1:30 PM   DISPOSITION:Home/Self Care    IMPORTANT INFORMATION:  Send all requests for admission clinical reviews, approved or denied determinations and any other requests to dedicated fax number below belonging to the campus where the patient is receiving treatment   List of dedicated fax numbers:  [address] DENIALS (Administrative/Medical Necessity) [phone]   [address] (Maternity/NICU/Pediatrics) [phone]   [facility] [phone]   [facility] 87 [phone]   [address] [phone]   220 [facility] [place] [phone] 90 [facility] [phone]   [address] [place] [address] 119 [phone]   [facility]  [phone] 4058 [facility] [phone]   412 [facility] [address] [facility] [phone]

## 2023-03-16 NOTE — LACTATION NOTE
This note was copied from a baby's chart  Met with parents to follow up form yesterday's encounter and discuss the Breastfeeding Discharge Booklet  Baby is currently at a 4 4% weight loss, having wet and dirty diapers and 24 hour bilirubin was in the low intermediate range  Mother discussed that baby cluster fed during the night and has been latching well for her  She has not had to use the latch assist for feedings, but will keep to use at home if needed  Showed the feeding log to could be continued using once home for up to the week and discussed the importance of ensuring that baby feeds 8-12x in 24 hours and that baby has 6-8 wet diapers as well as 3-4 soiled diapers (looking for stool transition from meconium to a yellow/gold seedy loose stool)  Mother given resources to look up medications to ensure they are safe with breastfeeding, by communicating with the [employer]  Nakita German, [address] as well as using [employer] (assisted mother to pin to home screen on personal phone)    Discussed engorgement time frame (when mature milk comes in) and management as well as how to deal with conditions that may occur while breastfeeding (plugged ducts, milk blebs and mastitis) and when is appropriate to communicate with her OB/GYN and/or a lactation consultant  Discussed how to set up a pump, how to cycle (stimulation vs expression phases during a pumping session), flange fit, milk storage and cleaning  Mother shown handouts for tips on pumping when returning to work and paced bottle feeding that was discussed and demonstrated  Mother shown community resources for continued support in breastfeeding once discharged home  She was encouraged to communicate with [facility] Nakita German, [facility] for lactation home visits and/or with her baby's pediatrician for lactation support/services that could be offered in the practice      Parents were encouraged to call for further questions that arise prior to discharge

## 2023-03-16 NOTE — PROGRESS NOTES
Progress Note - OB/GYN  Post-Partum Physician Note   Tory Hobbs 40 y o  female MRN: [id number]  Unit/Bed#:  206-01 Encounter: [id number]    Patient is postpartum day 2 from a , Low Transverse   and Anesthesia: spinal    Subjective:   Pain: controlled  Tolerating Oral Intake: yes  Voiding: yes  Flatus: yes  Bowel Movement: no  Ambulating: yes  Breastfeeding: Breastfeeding  Shortness of Breath: no  Leg Pain/Discomfort: no  Lochia: normal      Objective:   Vitals:    03/15/23 2313 23 0329 23 0700 23 0800   BP: 142/84 115/61 133/90 138/59   BP Location: Left arm Left arm Right arm Left arm   Pulse: 74 74 75    Resp: 16 16 16    Temp: 97 8 °F (36 6 °C) 98 °F (36 7 °C) 97 9 °F (36 6 °C)    TempSrc: Temporal Temporal Oral    SpO2:  97% 99%    Weight:       Height:         No intake or output data in the 24 hours ending 23 1120    Physical Exam:  General: in no apparent distress  Abdomen: abdomen is soft without significant tenderness  Fundus: Firm and non-tender, 2 below the umbilicus  Incision: CDI  Lower extremities: nontender      Labs/Tests:   Lab Results   Component Value Date    WBC 11 39 (H) 03/15/2023    HGB 10 2 (L) 03/15/2023    HCT 30 2 (L) 03/15/2023    MCV 98 03/15/2023     03/15/2023       Brief OB Lab review:  ABO Grouping   Date Value Ref Range Status   2023 O  Final      Rh Factor   Date Value Ref Range Status   2023 Negative  Final     Rh Type   Date Value Ref Range Status   2023 Negative  Final     Comment:     Please note: Prior records for this patient's ABO / Rh type are not  available for additional verification        No results found for: ANTIBODYSCR  No results found for: RUBM    MEDS:   Current Facility-Administered Medications   Medication Dose Route Frequency   • acetaminophen (TYLENOL) tablet 650 mg  650 mg Oral Q4H PRN   • calcium carbonate (TUMS) chewable tablet 1,000 mg  1,000 mg Oral TID PRN   • diphenhydrAMINE (BENADRYL) injection 25 mg  25 mg Intravenous Q6H PRN   • docusate sodium (COLACE) capsule 100 mg  100 mg Oral BID PRN   • fentaNYL (SUBLIMAZE) injection 25 mcg  25 mcg Intravenous Q5 Min PRN   • ibuprofen (MOTRIN) tablet 600 mg  600 mg Oral Q6H   • lactated ringers infusion  125 mL/hr Intravenous Continuous   • metoclopramide (REGLAN) injection 10 mg  10 mg Intravenous Q6H PRN   • ondansetron (ZOFRAN) injection 4 mg  4 mg Intravenous Once PRN   • ondansetron (ZOFRAN) injection 4 mg  4 mg Intravenous Q8H PRN   • oxyCODONE (ROXICODONE) IR tablet 10 mg  10 mg Oral Q4H PRN   • oxyCODONE (ROXICODONE) IR tablet 5 mg  5 mg Oral Q4H PRN   • simethicone (MYLICON) chewable tablet 80 mg  80 mg Oral 4x Daily PRN     Invasive Devices     Peripheral Intravenous Line  Duration           Peripheral IV 23 Dorsal (posterior); Left Forearm 2 days                Assessment and Plan:  40y o  year-old , postpartum day 2 status-post  , Low Transverse    Continue routine postpartum care  Encourage ambulation    Planning discharge today    Rh negative state in antepartum period  - baby Rh positive  Patient received Rhogam 3/14/2023  Postpartum anemia   - mild anemia pp hgb 10 2g/dl  Asx  Will restart po iron  Gestational hypertension, third trimester   - mildly elevated BP on admission for   Labs normal   No s/s preeclampsia   - Post partum occasionally elevated BP in mild range  Labs repeated 3/15/2023 and platelets, liver function and creatinine normal   No symptoms preeclampsia  - BP normal this morning      - will d/c home with preeclampsia precautions and have return to office in 1 week for BP check        Aldo Maddox MD

## 2023-03-23 ENCOUNTER — CLINICAL SUPPORT (OUTPATIENT)
Dept: OBGYN CLINIC | Facility: CLINIC | Age: 38
End: 2023-03-23

## 2023-03-23 ENCOUNTER — TELEPHONE (OUTPATIENT)
Dept: OBGYN CLINIC | Facility: CLINIC | Age: 38
End: 2023-03-23

## 2023-03-23 VITALS
DIASTOLIC BLOOD PRESSURE: 68 MMHG | BODY MASS INDEX: 25.73 KG/M2 | SYSTOLIC BLOOD PRESSURE: 142 MMHG | HEIGHT: 72 IN | WEIGHT: 190 LBS

## 2023-03-23 DIAGNOSIS — R03.0 ELEVATED BP WITHOUT DIAGNOSIS OF HYPERTENSION: Primary | ICD-10-CM

## 2023-03-23 NOTE — PROGRESS NOTES
Екатерина Watson presents for one week B/P check for gestational hypertension  Delivered via LTCS on 3/14/2023  She reports doing well at home  Denies headache, blurred vision, dizziness, edema or RUQ pain  Has not been taking B/P at home  Has not obtained B/P cuff  B/P today 142/68  Encouraged patient to stop on the way home and purchase home b/p monitor  Check B/P daily- report B/P above 160/100 or above  Call if develops headache, blurred vision, dizziness, edema, RUQ pain or any additional concerns or questions  Will review with provider on call and contact her if additional recommendations are indicated

## 2023-05-05 ENCOUNTER — POSTPARTUM VISIT (OUTPATIENT)
Dept: OBGYN CLINIC | Facility: CLINIC | Age: 38
End: 2023-05-05

## 2023-05-05 VITALS
HEIGHT: 72 IN | DIASTOLIC BLOOD PRESSURE: 72 MMHG | WEIGHT: 192 LBS | BODY MASS INDEX: 26.01 KG/M2 | SYSTOLIC BLOOD PRESSURE: 128 MMHG

## 2023-05-05 NOTE — PROGRESS NOTES
"[address], [facility], [address] [facility]    Assessment/Plan:  Dewayne Torres is a 40y o  year old  who presents for postpartum visit  Routine Postpartum Care  1  Normal postpartum exam  2  Contraception: condoms  3  Depression Screen: Low risk  4  Feeding: Breast with bottle supplement  5  Cervical cancer screening Up to Date  6  Follow up in: 3 months for annual exam or as needed  Additional Problems:  1  Postpartum examination following  delivery          Subjective:     CC: Postpartum visit    Lona Medel is a 40 y o  y o  female  who presents for a postpartum visit  She is 7 weeks postpartum following a primary  section for placenta previa on 3/14/2023 at 37 weeks  Postpartum course has been uncomplicated  Bleeding no bleeding  Bowel function is normal  Bladder function is normal  Patient is not sexually active  Postpartum Depression: Low Risk     Last EPDS Total Score: 0    Last EPDS Self Harm Result: Never       The following portions of the patient's history were reviewed and updated as appropriate: allergies, current medications, past family history, past medical history, obstetric history, gynecologic history, past social history, past surgical history and problem list     Objective:  /72 (BP Location: Left arm, Patient Position: Sitting, Cuff Size: Standard)   Ht 6' 1\" (1 854 m)   Wt 87 1 kg (192 lb)   LMP 2022   Breastfeeding Yes   BMI 25 33 kg/m²   Pregravid Weight/BMI: 78 5 kg (173 lb) (BMI 22 83)  Current Weight: 87 1 kg (192 lb)   Total Weight Gain: 17 4 kg (38 lb 6 4 oz)   Pre-Katelynn Vitals    Flowsheet Row Most Recent Value   Prenatal Assessment    Prenatal Vitals    Blood Pressure 128/72   Weight - Scale 87 1 kg (192 lb)   Urine Albumin/Glucose    Dilation/Effacement/Station    Vaginal Drainage    Edema            Chaperone present? Yes: Blaise Jacobs MA      General Appearance: alert and " oriented, in no acute distress  Abdomen: Soft, non-tender, non-distended, no masses, no rebound or guarding  Pfannenstiel incision well-healed  Pelvic:       External genitalia: Normal appearance, no abnormal pigmentation, no lesions or masses  Normal [place]'s  Urinary system: Urethral meatus normal, bladder non-tender  Vaginal: normal mucosa without prolapse or lesions  Normal-appearing physiologic discharge  Cervix: Normal-appearing, well-epithelialized, no gross lesions or masses  No cervical motion tenderness  Adnexa: No adnexal masses or tenderness noted  Uterus: Normal-sized, regular contour, midline, mobile, no uterine tenderness  Extremities: Normal range of motion     Skin: normal, no rash or abnormalities  Neurologic: alert, oriented x3  Psychiatric: Appropriate affect, mood stable, cooperative with exam         Allyssa Hubbard MD  5/5/2023 11:15 AM

## 2024-08-21 LAB
EXTERNAL CHLAMYDIA RESULT: NEGATIVE
EXTERNAL HIV SCREEN: NORMAL
HCV AB SER-ACNC: NON REACTIVE
N GONORRHOEA RRNA SPEC QL PROBE: NEGATIVE

## 2024-10-23 ENCOUNTER — ULTRASOUND (OUTPATIENT)
Dept: OBGYN CLINIC | Facility: CLINIC | Age: 39
End: 2024-10-23
Payer: COMMERCIAL

## 2024-10-23 VITALS
WEIGHT: 179.8 LBS | BODY MASS INDEX: 24.35 KG/M2 | SYSTOLIC BLOOD PRESSURE: 142 MMHG | DIASTOLIC BLOOD PRESSURE: 70 MMHG | HEIGHT: 72 IN

## 2024-10-23 DIAGNOSIS — O09.811 SUPERVISION OF PREGNANCY RESULTING FROM ASSISTED REPRODUCTIVE TECHNOLOGY IN FIRST TRIMESTER: ICD-10-CM

## 2024-10-23 DIAGNOSIS — N91.2 AMENORRHEA: Primary | ICD-10-CM

## 2024-10-23 DIAGNOSIS — Z3A.09 9 WEEKS GESTATION OF PREGNANCY: ICD-10-CM

## 2024-10-23 PROCEDURE — 99213 OFFICE O/P EST LOW 20 MIN: CPT | Performed by: OBSTETRICS & GYNECOLOGY

## 2024-10-23 PROCEDURE — 76817 TRANSVAGINAL US OBSTETRIC: CPT | Performed by: OBSTETRICS & GYNECOLOGY

## 2024-10-23 RX ORDER — FOLIC ACID 1 MG/1
TABLET ORAL DAILY
COMMUNITY
End: 2024-11-12

## 2024-10-23 RX ORDER — ESTRADIOL 2 MG/1
TABLET ORAL
COMMUNITY
Start: 2024-09-06

## 2024-10-23 RX ORDER — PROGESTERONE 90 MG/1.125G
GEL VAGINAL
COMMUNITY
Start: 2024-09-30

## 2024-10-23 RX ORDER — PROGESTERONE 50 MG/ML
INJECTION, SOLUTION INTRAMUSCULAR
COMMUNITY
Start: 2024-10-05

## 2024-11-05 ENCOUNTER — PATIENT OUTREACH (OUTPATIENT)
Dept: OBGYN CLINIC | Facility: CLINIC | Age: 39
End: 2024-11-05

## 2024-11-05 ENCOUNTER — INITIAL PRENATAL (OUTPATIENT)
Dept: OBGYN CLINIC | Facility: CLINIC | Age: 39
End: 2024-11-05

## 2024-11-05 VITALS — HEIGHT: 72 IN | BODY MASS INDEX: 23.72 KG/M2

## 2024-11-05 DIAGNOSIS — Z36.89 ENCOUNTER FOR OTHER SPECIFIED ANTENATAL SCREENING: Primary | ICD-10-CM

## 2024-11-05 DIAGNOSIS — Z3A.11 11 WEEKS GESTATION OF PREGNANCY: ICD-10-CM

## 2024-11-05 NOTE — PROGRESS NOTES
SW referred for initial prenatal assessment. Patient is , [id number] with an TIFFANY of 25. Patient agreeable to speaking with SW by phone today.    Patient reports this is a planned pregnancy ([facility] - [facility]). She and FOB ( Anup) both work and drive. Patient denies needing information for MA/WIC/SNAP, parenting education, baby supply resources, or ACP today.    Patient denies current or h/o mental health, substance use, DV/IPV, CYS Involvement, and legal concerns. Former cigarette smoker - quit over 10 years ago. No h/o PPD or PPA. Has good support from FOB, family, and friends. Enjoys reading, going for walks, and spending time outside with her son for stress relief.    No SW needs identified at this time, SW closing referral. Please re-refer as needed.

## 2024-11-05 NOTE — PATIENT INSTRUCTIONS
Congratulation!! Please review our Pregnancy Essential Guide and [facility] L&D Virtual tour from our networks website.     [facility] Pregnancy Essentials Guide  [facility] ([email or web address])     [facility] - Virtual Tour ([employer])  Yue James,    It was so nice getting to know you today. Remember if you have an urgent or time sensitive concern, please call the practice phone number so a clinical triage team member can review your symptoms and get in touch with our on call provider if necessary. If you have general questions or need help navigating our services please REPLY to this message so it comes directly to me and I will respond between other patients. If I am out of the office for any reason, another nurse navigator may reach out to help you. Our Pregnancy Essential Guide is a great online resource--please use the link below.     [facility] Pregnancy Essentials Guide  [facility] ([email or web address])     Again, Congratulations and Thank You for choosing [facility]. I look forward to helping you through this journey. Reach out -   Mariam TSANG RN

## 2024-11-05 NOTE — PROGRESS NOTES
"Routine Prenatal Visit  [facility] - [address], [place] [address]      Assessment/Plan:  38 y.o.  presenting with missed menses.  Viable pregnancy (A) 9w2d by LMP consistent with ultrasound today wit a loss of embryo B  - Continue/start prenatal vitamin  - We reviewed her current medications and discussed which are safe to continue in pregnancy  - We briefly discussed options for aneuploidy screening, to be discussed further at the prenatal intake  - Schedule prenatal intake with RN and initial prenatal visit; prenatal labs will be ordered during the prenatal intake      Subjective:    CC: Missed period    Erika Coburn is a 38 y.o.  who presents with missed menses.  No LMP recorded. Patient is pregnant.    Patient notes that this pregnancy was planned and desired.  She was not using contraception at the time of conception. She reports she is certain of her LMP and that she has regular menses. She has has no vaginal bleeding since her LMP.    Objective:  /70 (BP Location: Right arm, Patient Position: Sitting, Cuff Size: Large)   Ht 5' 11.75\" (1.822 m)   Wt 81.6 kg (179 lb 12.8 oz)   Breastfeeding No Comment: IVF transfer 24  BMI 24.56 kg/m²     Physical Exam:  General: Well appearing, no distress  CV: Regular rate  Respiratory: Unlabored breathing  Abdomen: Soft, nontender  Extremities: Without edema  Mood and Affect: Appropriate    Transvaginal Pelvic Ultrasound  Boyer IUP  Yolk sac: Present  Fetal Pole: Present  CRL consistent with EGA 9w 2d  Cardiac activity: Present   bpm  No adnexal masses appreciated    "

## 2024-11-05 NOTE — PROGRESS NOTES
OB INTAKE INTERVIEW  Patient is 38 y.o. who presents for OB intake at 11 wks 2 days  She is accompanied by herself during this encounter  The father of her baby (Anup Coburn) is involved in the pregnancy and is 48 years old.      Last Menstrual Period: 2024  Ultrasound: Measured 9  weeks 2 days on 10/23/2024  Estimated Date of Delivery: 2025 confirmed by US    Signs/Symptoms of Pregnancy  Current pregnancy symptoms: urinary frequency, nausea, no vomiting  Constipation no  Headaches no  Cramping/spotting no  PICA cravings no    Diabetes-  There is no height or weight on file to calculate BMI.  If patient has 1 or more, please order early 1 hour GTT  History of GDM no  BMI >35 no  History of PCOS or current metformin use no  History of LGA/macrosomic infant (4000g/9lbs) no    If patient has 2 or more, please order early 1 hour GTT  BMI>30 no  AMA YES  First degree relative with type 2 diabetes no  History of chronic HTN, hyperlipidemia, elevated A1C no  High risk race (, , ,  or ) no    Hypertension- if you answer yes to any of the following, please order baseline preeclampsia labs (cbc, comprehensive metabolic panel, urine protein creatinine ratio, uric acid)  History of of chronic HTN no  History of gestational HTN no  History of preeclampsia, eclampsia, or HELLP syndrome no  History of diabetes no  History of lupus,sjogrens syndrome, kidney disease no    Thyroid- if yes order TSH with reflex T4  History of thyroid disease no    Bleeding Disorder or Hx of DVT-patient or first degree relative with history of. Order the following if not done previously.   (Factor V, antithrombin III, prothrombin gene mutation, protein C and S Ag, lupus anticoagulant, anticardiolipin, beta-2 glycoprotein)   no    OB/GYN-  History of abnormal pap smear no       Date of last pap smear 2021 (wnl (-) hpv)  History of HPV no  History of Herpes/HSV no  History  of other STI (gonorrhea, chlamydia, trich) no  History of prior  no  History of prior  YES (37 wk scheduled C/S for placenta previa)  History of  delivery prior to 36 weeks 6 days no  History of Varicella or Vaccination patient had chickenpox in childhood  History of blood transfusion no  Ok for blood transfusion YES    Substance screening-   History of tobacco use YES (quit )  Currently using tobacco no  Substance Use Screen Level (N/A, LOW, HIGH) N/A    MRSA Screening-   Does the pt have a hx of MRSA? no    Immunizations:  Influenza vaccine given this season NO - patient does not usually get flu vaccine - recommended in pregnancy  Discussed Tdap vaccine YES  Discussed COVID Vaccine YES    Genetic/MFM-  Do you or your partner have a history of any of the following in yourselves or first degree relatives?  Cystic fibrosis no  Spinal muscular atrophy no  Hemoglobinopathy/Sickle Cell/Thalassemia no  Fragile X Intellectual Disability no    If yes, discuss Carrier Screening and recommend consultation with MFM/Genetic Counseling and place specific [facility] Referral for.    If no, discuss Carrier Screening being completed once in a lifetime as a standard of care lab test. Place orders for Cystic Fibrosis Gene Test (FHV868) and Spinal Muscular Atrophy DNA ([id number])  - previous (-) CF & SMA carrier screening     Appointment for Nuchal Translucency Ultrasound at [facility] scheduled for - patient will call & schedule NT ultrasound & will have NIPT/MSAFP - referral was placed in chart today      Interview education  [facility] Pregnancy Essentials Book reviewed, discussed and attached to their AVS YES    Nurse/Family Partnership- patient may qualify; referral placed NO    Prenatal lab work scripts YES ([facility]) - lab order mailed to pt  Extra labs ordered: none      Aspirin/Preeclampsia Screen    Risk Level Risk Factor Recommendation   LOW Prior Uncomplicated full-term delivery no No Aspirin recommendation         MODERATE Nulliparity no Recommend low-dose aspirin if     BMI>30 no 2 or more moderate risk factors    Family History Preeclampsia (mother/sister) YES x 1 preg     35yr old or greater YES     Black Race, Concern for SDOH/Low Socioeconomic no     IVF Pregnancy  YES     Personal History Risks (low birth weight, prior adverse preg outcome, >10yr preg interval)         HIGH History of Preeclampsia no Recommend low-dose aspirin if     Multifetal gestation YES (loss of twin x last preg & this preg) 1 or more high risk factors    Chronic HTN no     Type 1 or 2 Diabetes no     Renal Disease no     Autoimmune Disease  no      Contraindications to ASA therapy:  NSAID/ ASA allergy: no  Nasal polyps: no  Asthma with history of ASA induced bronchospasm: no  Relative contraindications:  History of GI bleed: no  Active peptic ulcer disease: no  Severe hepatic dysfunction: no    Patient should be recommended to take ASA 162mg during this pregnancy from 12-36wks to lower her risk of preeclampsia: recommended to take LDASA 162 mg/day from wk 12-36          The patient has a history now or in prior pregnancy notable for:    - IVF pregnancy - transfer date 9/1/2024 (loss of twin) - [facility]  - hx primary C/S @ 37 wk - placenta previa 3/14/2023 (also had loss of twin) - probably interested in repeat C/S  - currently still taking progesterone IM, crinone gel & estradiol  - Rh NEGATIVE      Details that I feel the provider should be aware of:   - patient is up to date on q 6 month dental cleanings  - patient had cat x 1 - FOB changes cat litter  - no dietary restrictions  - patient works @ [employer] - vaccine  - discussed weight restriction in pregnancy        PN1 visit scheduled. The patient was oriented to our practice, the navigator role, reviewed delivering physicians and [facility] for Delivery. All questions were answered.    Interviewed by: ALFONZO Ceballos, RN

## 2024-11-11 PROBLEM — O09.291: Status: ACTIVE | Noted: 2024-11-11

## 2024-11-11 PROBLEM — O13.3 GESTATIONAL HYPERTENSION, THIRD TRIMESTER: Status: RESOLVED | Noted: 2023-03-14 | Resolved: 2024-11-11

## 2024-11-11 PROBLEM — R03.0 ELEVATED BP WITHOUT DIAGNOSIS OF HYPERTENSION: Status: RESOLVED | Noted: 2022-12-12 | Resolved: 2024-11-11

## 2024-11-11 PROBLEM — O09.811 PREGNANCY RESULTING FROM IN VITRO FERTILIZATION IN FIRST TRIMESTER: Status: ACTIVE | Noted: 2024-11-11

## 2024-11-11 PROBLEM — O44.03 PLACENTA PREVIA, THIRD TRIMESTER: Status: RESOLVED | Noted: 2022-11-23 | Resolved: 2024-11-11

## 2024-11-11 PROBLEM — E83.51 HYPOCALCEMIA: Status: RESOLVED | Noted: 2022-10-12 | Resolved: 2024-11-11

## 2024-11-11 PROBLEM — O34.219 HISTORY OF CESAREAN DELIVERY, ANTEPARTUM: Status: ACTIVE | Noted: 2024-11-11

## 2024-11-11 PROBLEM — O09.521 MULTIGRAVIDA OF ADVANCED MATERNAL AGE IN FIRST TRIMESTER: Status: ACTIVE | Noted: 2024-11-11

## 2024-11-12 ENCOUNTER — ROUTINE PRENATAL (OUTPATIENT)
Dept: PERINATAL CARE | Facility: OTHER | Age: 39
End: 2024-11-12
Payer: COMMERCIAL

## 2024-11-12 VITALS
DIASTOLIC BLOOD PRESSURE: 64 MMHG | BODY MASS INDEX: 24.65 KG/M2 | SYSTOLIC BLOOD PRESSURE: 122 MMHG | HEIGHT: 72 IN | HEART RATE: 92 BPM | WEIGHT: 182 LBS

## 2024-11-12 DIAGNOSIS — O09.811 PREGNANCY RESULTING FROM IN VITRO FERTILIZATION IN FIRST TRIMESTER: Primary | ICD-10-CM

## 2024-11-12 DIAGNOSIS — O34.219 HISTORY OF CESAREAN DELIVERY, ANTEPARTUM: ICD-10-CM

## 2024-11-12 DIAGNOSIS — O09.291: ICD-10-CM

## 2024-11-12 DIAGNOSIS — O09.521 MULTIGRAVIDA OF ADVANCED MATERNAL AGE IN FIRST TRIMESTER: ICD-10-CM

## 2024-11-12 DIAGNOSIS — Z36.82 ENCOUNTER FOR (NT) NUCHAL TRANSLUCENCY SCAN: ICD-10-CM

## 2024-11-12 DIAGNOSIS — O31.21X1 TWIN PREGNANCY WITH SINGLE INTRAUTERINE DEATH, FIRST TRIMESTER, FETUS 1: ICD-10-CM

## 2024-11-12 DIAGNOSIS — Z3A.12 12 WEEKS GESTATION OF PREGNANCY: ICD-10-CM

## 2024-11-12 DIAGNOSIS — Z87.59 HISTORY OF GESTATIONAL HYPERTENSION: ICD-10-CM

## 2024-11-12 PROCEDURE — 76813 OB US NUCHAL MEAS 1 GEST: CPT | Performed by: OBSTETRICS & GYNECOLOGY

## 2024-11-12 PROCEDURE — 99214 OFFICE O/P EST MOD 30 MIN: CPT | Performed by: OBSTETRICS & GYNECOLOGY

## 2024-11-12 PROCEDURE — 76801 OB US < 14 WKS SINGLE FETUS: CPT | Performed by: OBSTETRICS & GYNECOLOGY

## 2024-11-12 RX ORDER — ASPIRIN 81 MG/1
162 TABLET ORAL DAILY
COMMUNITY
Start: 2024-11-10

## 2024-11-12 NOTE — LETTER
2024     Raf Tse MD  [address]  [address]  [facility] [address]    Patient: Erika Coburn   YOB: 1985   Date of Visit: 2024       Dear Dr. Tse:    Thank you for referring Erika Coburn to me for evaluation. Below are my notes for this consultation.    If you have questions, please do not hesitate to call me. I look forward to following your patient along with you.         Sincerely,        Otilia Osborne MD        CC: No Recipients    Otilia Osborne MD  2024  9:54 PM  Sign when Signing Visit  OFFICE CONSULT  Referring physician:   Raf Tse Md  [address]  [address]  [place] [address]      Dear Dr. Tse      Thank you for requesting a  consultation on your patient Ms. Erika Coburn for the following indications:  Genetic screening    This is an IVF pregnancy they did not undergo preimplantation genetics.  Two  3 day old frozen embryos were implanted and there was a 8-week loss found for baby A.  These were the last 2 embryos that Erika and her partner Anup had left.  In a prior pregnancy she had a similar outcome - 2 embryos were implanted and only 1 remained viable.  They report their child did well and is normal today.    Her IVF providers have recommended using her due date of 2025 based on her transfer date of 24 using a 3 day old embryo.     History  Medications: Aspirin 162 mg daily, Crinone gel, Estrace, prenatal vitamins and progesterone injections as per AYLEEN recommendations.  She reports she has a plan in process to wean her hormones over time.  Allergies to medications: None  Past medical history: Advanced maternal age, Rh-  Past surgical history: Prior  section  Past obstetrical history:   3/14/2023 at 37 weeks and 0 days she had a 5 pound 15 ounce baby boy by low-transverse .  Delivery was early secondary to a previa.  She also had gestational hypertension. She is  considering a repeat .  Social history: She reports no substance use  First generation family history: She reports her mother had preeclampsia with her last child.     Ultrasound findings: The ultrasound shows a fetus concordant with dates. The nasal bone and nuchal translucency appears normal. No malformations are seen on today's early ultrasound.     The patient was informed of the findings and counseled about the limitations of the exam in detecting all forms of fetal congenital abnormalities.    She does not report any vaginal bleeding or uterine cramping or contractions.      Specific counseling was provided on the following problems:  We discussed the options for genetic screening which include invasive testing on the fetal placenta or on fetal skin cells within the amniotic fluid and compared this to noninvasive testing which includes cell free DNA screening.  We reviewed the risks, the benefits and the limitations of each.  In the end patient would like to complete cell free DNA screening.  In her last pregnancy she had completed NIPT in the first trimester with the understanding that if it returned as abnormal then we would do an early 16-week ultrasound to look for any sign of anomalies that would go along with the diagnosis of aneuploidy in the remaining twin. Other option is to delay testing for 8-10 weeks from the time of the loss. For now will plan for NIPT to coinside with her 20 week anatomy scan.   [facility] IVF pregnancies with or without ICSI are at a higher risk of  birth, preeclampsia and low birth weight ( <2500 gms).  Women who undergo ART appear to be at increased risk of delivering offspring with congenital malformations compared with fertile women who conceive naturally, but the reason for this increase is unclear. The increased risk of birth defects was observed for all major organ systems, and was highest for the nervous system. A small increased risk for cardiac  malformations is also noted for which I recommend a 22-24 week fetal echo. Recommend a third trimester growth scan. American College of ObGYN recommend weekly nst/bee from 36 weeks on in IVF pregnancies. Daily baby aspirin started before 16 weeks and continued till 36 weeks may lessen her risk for preeclampsia to develop.   With her history of gestational hypertension in a prior pregnancy recommend baseline preeclamptic labs.   Advanced Maternal Age (AMA) is defined as maternal age 35 or greater at the best estimation of the due date. Advanced maternal age is associated with an increased risk of several pregnancy outcomes, including aneuploidy/genetic syndromes, growth abnormalities,  delivery, stillbirth, maternal hypertensive disorders, and gestational diabetes. Despite these increased risks, many women of advanced maternal age have normal, healthy pregnancy outcomes, particularly if they have no co-existing medical conditions. Risk of adverse outcomes is proportional to patient age. These risks can be mitigated but not eliminated by optimizing maternal medical health preconception, aspirin prophylaxis when indicated, and optimizing weight gain.      Future tests recommended:  Her NIPT screen will be drawn at 20 weeks unless Erika calls to set up the earlier nursing visit to draw it sooner. Screening for spina bifida with an MSAFP screen is a future test that can be prescribed through her OB office.  This blood work should be drawn preferably at 16 to 18 weeks so that the results return prior to her next scan.  The test though can be run until 21 weeks and 6 days if needed.    Future ultrasounds ordered today:   Fetal Level II ultrasound imaging is recommended at 19-20 weeks' gestation.    Pre visit time reviewing her records   5 minutes  Face to face time 15 minutes  Post visit time on documentation of note, updating her problem list, adding orders and prescriptions 15 minutes.  Procedures that were  completed today were charged separately.   The level of decision making was low level complexity.    Otilia Osborne MD

## 2024-11-12 NOTE — PROGRESS NOTES
OFFICE CONSULT  Referring physician:   Raf Tse Md  [address] [place] [address]  [address]  [place] [address]      Dear Dr. Tse      Thank you for requesting a  consultation on your patient Ms. Erika Coburn for the following indications:  Genetic screening    This is an IVF pregnancy they did not undergo preimplantation genetics.  Two  3 day old frozen embryos were implanted and there was a 8-week loss found for baby A.  These were the last 2 embryos that Erika and her partner Anup had left.  In a prior pregnancy she had a similar outcome - 2 embryos were implanted and only 1 remained viable.  They report their child did well and is normal today.    Her IVF providers have recommended using her due date of 2025 based on her transfer date of 24 using a 3 day old embryo.     History  Medications: Aspirin 162 mg daily, Crinone gel, Estrace, prenatal vitamins and progesterone injections as per AYLEEN recommendations.  She reports she has a plan in process to wean her hormones over time.  Allergies to medications: None  Past medical history: Advanced maternal age, Rh-  Past surgical history: Prior  section  Past obstetrical history:   3/14/2023 at 37 weeks and 0 days she had a 5 pound 15 ounce baby boy by low-transverse .  Delivery was early secondary to a previa.  She also had gestational hypertension. She is considering a repeat .  Social history: She reports no substance use  First generation family history: She reports her mother had preeclampsia with her last child.     Ultrasound findings: The ultrasound shows a fetus concordant with dates. The nasal bone and nuchal translucency appears normal. No malformations are seen on today's early ultrasound.     The patient was informed of the findings and counseled about the limitations of the exam in detecting all forms of fetal congenital abnormalities.    She does not report any vaginal bleeding or uterine cramping or  contractions.      Specific counseling was provided on the following problems:  We discussed the options for genetic screening which include invasive testing on the fetal placenta or on fetal skin cells within the amniotic fluid and compared this to noninvasive testing which includes cell free DNA screening.  We reviewed the risks, the benefits and the limitations of each.  In the end patient would like to complete cell free DNA screening.  In her last pregnancy she had completed NIPT in the first trimester with the understanding that if it returned as abnormal then we would do an early 16-week ultrasound to look for any sign of anomalies that would go along with the diagnosis of aneuploidy in the remaining twin. Other option is to delay testing for 8-10 weeks from the time of the loss. For now will plan for NIPT to coinside with her 20 week anatomy scan.   [facility] IVF pregnancies with or without ICSI are at a higher risk of  birth, preeclampsia and low birth weight ( <2500 gms).  Women who undergo ART appear to be at increased risk of delivering offspring with congenital malformations compared with fertile women who conceive naturally, but the reason for this increase is unclear. The increased risk of birth defects was observed for all major organ systems, and was highest for the nervous system. A small increased risk for cardiac malformations is also noted for which I recommend a 22-24 week fetal echo. Recommend a third trimester growth scan. American College of ObGYN recommend weekly nst/bee from 36 weeks on in IVF pregnancies. Daily baby aspirin started before 16 weeks and continued till 36 weeks may lessen her risk for preeclampsia to develop.   With her history of gestational hypertension in a prior pregnancy recommend baseline preeclamptic labs.   Advanced Maternal Age (AMA) is defined as maternal age 35 or greater at the best estimation of the due date. Advanced maternal age is associated with an  increased risk of several pregnancy outcomes, including aneuploidy/genetic syndromes, growth abnormalities,  delivery, stillbirth, maternal hypertensive disorders, and gestational diabetes. Despite these increased risks, many women of advanced maternal age have normal, healthy pregnancy outcomes, particularly if they have no co-existing medical conditions. Risk of adverse outcomes is proportional to patient age. These risks can be mitigated but not eliminated by optimizing maternal medical health preconception, aspirin prophylaxis when indicated, and optimizing weight gain.      Future tests recommended:  Her NIPT screen will be drawn at 20 weeks unless Erika calls to set up the earlier nursing visit to draw it sooner. Screening for spina bifida with an MSAFP screen is a future test that can be prescribed through her OB office.  This blood work should be drawn preferably at 16 to 18 weeks so that the results return prior to her next scan.  The test though can be run until 21 weeks and 6 days if needed.    Future ultrasounds ordered today:   Fetal Level II ultrasound imaging is recommended at 19-20 weeks' gestation.    Pre visit time reviewing her records   5 minutes  Face to face time 15 minutes  Post visit time on documentation of note, updating her problem list, adding orders and prescriptions 15 minutes.  Procedures that were completed today were charged separately.   The level of decision making was low level complexity.    Otilia Osborne MD

## 2024-11-13 LAB
EXTERNAL ANTIBODY SCREEN: NORMAL
EXTERNAL HEMOGLOBIN: 13.6 G/DL
EXTERNAL HEPATITIS B SURFACE ANTIGEN: NEGATIVE
EXTERNAL HIV-1 AB: NEGATIVE
EXTERNAL HIV-1 P24 ANTIGEN: NEGATIVE
EXTERNAL HIV-2 AB: NEGATIVE
EXTERNAL PLATELET COUNT: 296 K/ÂΜL
EXTERNAL RH FACTOR: NEGATIVE
EXTERNAL RUBELLA IGG QUANTITATION: NORMAL
EXTERNAL SYPHILIS TOTAL IGG/IGM SCREENING: NEGATIVE

## 2024-11-15 LAB
ABO GROUP BLD: ABNORMAL
APPEARANCE UR: CLEAR
BACTERIA UR CULT: ABNORMAL
BACTERIA UR CULT: NO GROWTH
BACTERIA URNS QL MICRO: NORMAL
BASOPHILS # BLD AUTO: 0.1 X10E3/UL (ref 0–0.2)
BASOPHILS NFR BLD AUTO: 1 %
BILIRUB UR QL STRIP: NEGATIVE
BLD GP AB SCN SERPL QL: NEGATIVE
C TRACH RRNA SPEC QL NAA+PROBE: NEGATIVE
CASTS URNS QL MICRO: NORMAL /LPF
COLOR UR: YELLOW
EOSINOPHIL # BLD AUTO: 1.6 X10E3/UL (ref 0–0.4)
EOSINOPHIL NFR BLD AUTO: 15 %
EPI CELLS #/AREA URNS HPF: NORMAL /HPF (ref 0–10)
ERYTHROCYTE [DISTWIDTH] IN BLOOD BY AUTOMATED COUNT: 11.3 % (ref 11.7–15.4)
GLUCOSE UR QL: NEGATIVE
HBV SURFACE AG SERPL QL IA: NEGATIVE
HCT VFR BLD AUTO: 41.7 % (ref 34–46.6)
HCV AB S/CO SERPL IA: NON REACTIVE
HGB BLD-MCNC: 13.6 G/DL (ref 11.1–15.9)
HGB UR QL STRIP: NEGATIVE
HIV 1+2 AB+HIV1 P24 AG SERPL QL IA: NON REACTIVE
IMM GRANULOCYTES # BLD: 0 X10E3/UL (ref 0–0.1)
IMM GRANULOCYTES NFR BLD: 0 %
KETONES UR QL STRIP: NEGATIVE
LEUKOCYTE ESTERASE UR QL STRIP: NEGATIVE
LYMPHOCYTES # BLD AUTO: 2.6 X10E3/UL (ref 0.7–3.1)
LYMPHOCYTES NFR BLD AUTO: 25 %
MCH RBC QN AUTO: 32.2 PG (ref 26.6–33)
MCHC RBC AUTO-ENTMCNC: 32.6 G/DL (ref 31.5–35.7)
MCV RBC AUTO: 99 FL (ref 79–97)
MICRO URNS: ABNORMAL
MICRO URNS: ABNORMAL
MONOCYTES # BLD AUTO: 0.8 X10E3/UL (ref 0.1–0.9)
MONOCYTES NFR BLD AUTO: 7 %
N GONORRHOEA RRNA SPEC QL NAA+PROBE: NEGATIVE
NEUTROPHILS # BLD AUTO: 5.5 X10E3/UL (ref 1.4–7)
NEUTROPHILS NFR BLD AUTO: 52 %
NITRITE UR QL STRIP: NEGATIVE
PH UR STRIP: 5.5 [PH] (ref 5–7.5)
PLATELET # BLD AUTO: 296 X10E3/UL (ref 150–450)
PROT UR QL STRIP: NEGATIVE
RBC # BLD AUTO: 4.23 X10E6/UL (ref 3.77–5.28)
RBC #/AREA URNS HPF: NORMAL /HPF (ref 0–2)
RH BLD: NEGATIVE
RPR SER QL: NON REACTIVE
RUBV IGG SERPL IA-ACNC: 2.96 INDEX
SL AMB INTERPRETATION: NORMAL
SP GR UR: 1.02 (ref 1–1.03)
UROBILINOGEN UR STRIP-ACNC: 0.2 MG/DL (ref 0.2–1)
WBC # BLD AUTO: 10.4 X10E3/UL (ref 3.4–10.8)
WBC #/AREA URNS HPF: NORMAL /HPF (ref 0–5)

## 2024-11-20 ENCOUNTER — INITIAL PRENATAL (OUTPATIENT)
Dept: OBGYN CLINIC | Facility: CLINIC | Age: 39
End: 2024-11-20
Payer: COMMERCIAL

## 2024-11-20 VITALS
WEIGHT: 183 LBS | SYSTOLIC BLOOD PRESSURE: 110 MMHG | HEIGHT: 72 IN | BODY MASS INDEX: 24.79 KG/M2 | DIASTOLIC BLOOD PRESSURE: 68 MMHG

## 2024-11-20 DIAGNOSIS — Z3A.13 13 WEEKS GESTATION OF PREGNANCY: ICD-10-CM

## 2024-11-20 DIAGNOSIS — O26.899 RH NEGATIVE STATE IN ANTEPARTUM PERIOD: ICD-10-CM

## 2024-11-20 DIAGNOSIS — O09.291: ICD-10-CM

## 2024-11-20 DIAGNOSIS — Z67.91 RH NEGATIVE STATE IN ANTEPARTUM PERIOD: ICD-10-CM

## 2024-11-20 DIAGNOSIS — Z12.4 SCREENING FOR MALIGNANT NEOPLASM OF THE CERVIX: ICD-10-CM

## 2024-11-20 DIAGNOSIS — O09.521 MULTIGRAVIDA OF ADVANCED MATERNAL AGE IN FIRST TRIMESTER: ICD-10-CM

## 2024-11-20 DIAGNOSIS — Z36.1 NEED FOR MATERNAL SERUM ALPHA-PROTEIN (MSAFP) SCREENING: ICD-10-CM

## 2024-11-20 DIAGNOSIS — O34.211 MATERNAL CARE DUE TO LOW TRANSVERSE UTERINE SCAR FROM PREVIOUS CESAREAN DELIVERY: Primary | ICD-10-CM

## 2024-11-20 DIAGNOSIS — O09.811 PREGNANCY RESULTING FROM IN VITRO FERTILIZATION IN FIRST TRIMESTER: ICD-10-CM

## 2024-11-20 LAB
SL AMB  POCT GLUCOSE, UA: NEGATIVE
SL AMB POCT URINE PROTEIN: NEGATIVE

## 2024-11-20 PROCEDURE — PNV: Performed by: OBSTETRICS & GYNECOLOGY

## 2024-11-20 PROCEDURE — 81002 URINALYSIS NONAUTO W/O SCOPE: CPT | Performed by: OBSTETRICS & GYNECOLOGY

## 2024-11-20 NOTE — PROGRESS NOTES
Routine Prenatal Visit  [facility] - [address], [address], [place] [address]    Assessment/Plan:  Erika is a 38 y.o. year old  at 13w3d who presents for routine prenatal visit.     1. Maternal care due to low transverse uterine scar from previous  delivery  Assessment & Plan:  Pt would like a repeat LTCS.  2. Pregnancy resulting from in vitro fertilization in first trimester  3. Rh negative state in antepartum period  4. Multigravida of advanced maternal age in first trimester  5. Current pregnancy in first trimester with history of placenta previa during prior pregnancy  6. 13 weeks gestation of pregnancy  -     POCT urine dip  7. Screening for malignant neoplasm of the cervix  -     IGP,CtNg,AptimaHPV,rfx16/18,45  8. Need for maternal serum alpha-protein (MSAFP) screening  -     Alpha fetoprotein, maternal; Future; Expected date: 2024  -     Alpha fetoprotein, maternal        Subjective:     CC: Prenatal care    Erika Coburn is a 38 y.o.  female who presents for routine prenatal care at 13w3d.  Pregnancy ROS: no leakage of fluid, pelvic pain, or vaginal bleeding.  no fetal movement.    The following portions of the patient's history were reviewed and updated as appropriate: allergies, current medications, past family history, past medical history, obstetric history, gynecologic history, past social history, past surgical history and problem list.      Objective:  /68   Ht 6' (1.829 m)   Wt 83 kg (183 lb)   BMI 24.82 kg/m²   Pregravid Weight/BMI: 80.3 kg (177 lb) (BMI 24.00)  Current Weight: 83 kg (183 lb)   Total Weight Gain: 2.722 kg (6 lb)   Pre-Katelynn Vitals      Flowsheet Row Most Recent Value   Prenatal Assessment    Fetal Heart Rate 160   Fundal Height (cm) 14 cm   Movement Absent   Prenatal Vitals    Blood Pressure 110/68   Weight - Scale 83 kg (183 lb)   Urine Albumin/Glucose    Dilation/Effacement/Station    Vaginal Drainage     Edema              General: Well appearing, no distress  Respiratory: Unlabored breathing  Cardiovascular: Regular rate.  Abdomen: Soft, gravid, nontender  Fundal Height: Appropriate for gestational age.  Extremities: Warm and well perfused.  Non tender.

## 2024-11-22 NOTE — PROGRESS NOTES
Please refer to the [facility] ultrasound report in Ob Procedures for additional information regarding today's visit normal...

## 2024-12-04 LAB
C TRACH RRNA CVX QL NAA+PROBE: NEGATIVE
CYTOLOGIST CVX/VAG CYTO: NORMAL
DX ICD CODE: NORMAL
HPV GENOTYPE REFLEX: NORMAL
HPV I/H RISK 4 DNA CVX QL PROBE+SIG AMP: NEGATIVE
Lab: NORMAL
N GONORRHOEA RRNA CVX QL NAA+PROBE: NEGATIVE
OTHER STN SPEC: NORMAL
PATH REPORT.FINAL DX SPEC: NORMAL
SL AMB NOTE:: NORMAL
SL AMB SPECIMEN ADEQUACY: NORMAL
SL AMB TEST METHODOLOGY: NORMAL

## 2024-12-17 ENCOUNTER — ROUTINE PRENATAL (OUTPATIENT)
Dept: OBGYN CLINIC | Facility: CLINIC | Age: 39
End: 2024-12-17
Payer: COMMERCIAL

## 2024-12-17 VITALS
WEIGHT: 187 LBS | HEIGHT: 72 IN | SYSTOLIC BLOOD PRESSURE: 126 MMHG | BODY MASS INDEX: 25.33 KG/M2 | DIASTOLIC BLOOD PRESSURE: 62 MMHG

## 2024-12-17 DIAGNOSIS — Z3A.17 17 WEEKS GESTATION OF PREGNANCY: Primary | ICD-10-CM

## 2024-12-17 DIAGNOSIS — O34.211 MATERNAL CARE DUE TO LOW TRANSVERSE UTERINE SCAR FROM PREVIOUS CESAREAN DELIVERY: ICD-10-CM

## 2024-12-17 DIAGNOSIS — O26.899 RH NEGATIVE STATE IN ANTEPARTUM PERIOD: ICD-10-CM

## 2024-12-17 DIAGNOSIS — O09.521 MULTIGRAVIDA OF ADVANCED MATERNAL AGE IN FIRST TRIMESTER: ICD-10-CM

## 2024-12-17 DIAGNOSIS — Z67.91 RH NEGATIVE STATE IN ANTEPARTUM PERIOD: ICD-10-CM

## 2024-12-17 DIAGNOSIS — O09.812 PREGNANCY RESULTING FROM IN VITRO FERTILIZATION IN SECOND TRIMESTER: ICD-10-CM

## 2024-12-17 LAB
SL AMB  POCT GLUCOSE, UA: NORMAL
SL AMB POCT URINE PROTEIN: NORMAL

## 2024-12-17 PROCEDURE — 81002 URINALYSIS NONAUTO W/O SCOPE: CPT | Performed by: OBSTETRICS & GYNECOLOGY

## 2024-12-17 PROCEDURE — PNV: Performed by: OBSTETRICS & GYNECOLOGY

## 2024-12-17 NOTE — PROGRESS NOTES
"Routine Prenatal Visit  [facility] - [address], [address], [place] [address]    Assessment/Plan:  Erika is a 39 y.o. year old  at 17w2d who presents for routine prenatal visit.     1. 17 weeks gestation of pregnancy  -     POCT urine dip  2. Maternal care due to low transverse uterine scar from previous  delivery  3. Rh negative state in antepartum period  4. Multigravida of advanced maternal age in first trimester  5. Pregnancy resulting from in vitro fertilization in second trimester        Subjective:     CC: Prenatal care    Erika Coburn is a 39 y.o.  female who presents for routine prenatal care at 17w2d.  Pregnancy ROS: no  leakage of fluid, pelvic pain, or vaginal bleeding.  + fetal movement.    The following portions of the patient's history were reviewed and updated as appropriate: allergies, current medications, past family history, past medical history, obstetric history, gynecologic history, past social history, past surgical history and problem list.      Objective:  /62 (BP Location: Left arm, Patient Position: Sitting, Cuff Size: Standard)   Ht 6' 1\" (1.854 m)   Wt 84.8 kg (187 lb)   BMI 24.67 kg/m²   Pregravid Weight/BMI: 80.3 kg (177 lb) (BMI 23.36)  Current Weight: 84.8 kg (187 lb)   Total Weight Gain: 4.536 kg (10 lb)   Pre-Katelynn Vitals    Flowsheet Row Most Recent Value   Prenatal Assessment    Fetal Heart Rate 164   Fundal Height (cm) 17 cm   Movement Present   Prenatal Vitals    Blood Pressure 126/62   Weight - Scale 84.8 kg (187 lb)   Urine Albumin/Glucose    Dilation/Effacement/Station    Vaginal Drainage    Draining Fluid No   Edema    LLE Edema None   RLE Edema None   Facial Edema None           General: Well appearing, no distress  Respiratory: Unlabored breathing  Cardiovascular: Regular rate.  Abdomen: Soft, gravid, nontender  Fundal Height: Appropriate for gestational age.  Extremities: Warm and well perfused.  Non " tender.

## 2024-12-17 NOTE — PATIENT INSTRUCTIONS
NUTRITION IN PREGNANCY  Good Nutrition is a VERY important part of having a healthy pregnancy and healthy baby.  You should follow a healthy diet which include the following:   * Vegetables (which are dark green and leafy): at least 2 servings each day   * Protein (meat, eggs, beans, nuts, peanut butter): 3-4 servings each day   * Breads/whole grains (bread, pasta, rice, tortillas, potatoes): 3 servings each day   * Dairy (milk, yogurt, cheese): 3-4 servings each day   * Water: 6-8 glasses per day   * Calories: approximately 2000 to 2200 calories per day     WEIGHT GAIN   Recommended weight gain for you during your pregnancy is based on your body mass index (BMI) at the time that you became pregnant.   Pre-pregnant BMI Recommended weight gain   Underweight (BMI less than 18.5) 28 to 40 pounds   Normal weight (BMI 18.5-24.9) 25 to 35 pounds  Overweight (BMI 25-29.9) 15 to 25 pounds   Obese (BMI 30 or greater) 11 to 20 pounds     FOOD SAFETY   It is VERY important to eat only safely-prepared foods during pregnancy as you and your baby have a higher risk than usual for being affected by foodborne illnesses.  Follow these steps to ensure that you and your baby are safe from foodborne illnesses while you are pregnant:   wash hands thoroughly with warm water and soap before and after handling any foods   wash cutting boards, dishes, utensils, and countertops with hot water and soap before and after preparing any foods   rinse raw fruits and vegetables thoroughly under running water before eating   keep raw meat and seafood separate from other foods and use different cutting boards/utensils to handle raw meat than for other foods   put cooked food on a freshly clean plate   cook all of your foods thoroughly   discard foods that have been left out for more than 2 hours   refrigerate or freeze any foods than can spoil     There are three particular foodborne risks that you should be aware of and avoid as they can cause  serious harm to your unborn child.     * Listeria (a harmful bacteria)   don’t eat hot dogs or deli meats (unless they’re reheated until steaming hot)   don’t eat soft cheeses (such as Feta, Brie, Camembert) unless they are specifically labeled as being “made with pasteurized milk”   don’t drink raw (unpasteurized) milk   don’t eat refrigerated pates or meat spreads   don’t eat refrigerated smoked seafood unless it’s in a cooked dish like a casserole     * Mercury (a metal which is found in certain fish in high levels)   don’t eat shark, tilefish, babak mackerel, or swordfish   don’t eat more than 12 ounces per week of shrimp, salmon, pollock, or catfish   when eating tuna fish, you can have up to 6 ounces per week of canned albacore tuna OR up to 12 ounces of canned light tuna     * Toxoplasma (a harmful parasite)   cook meat thoroughly before eating   wear gloves when gardening or handling sand from a child’s sandbox   if you ha tve a cat, have someone else change the litter box while you are pregnant.    if you HAVE to clean it yourself, be sure to wash your hands thoroughly afterwards with warm water and soap.   don’t get a NEW cat while you are pregnant

## 2024-12-24 ENCOUNTER — RESULTS FOLLOW-UP (OUTPATIENT)
Dept: OBGYN CLINIC | Facility: CLINIC | Age: 39
End: 2024-12-24

## 2024-12-24 LAB
ALBUMIN SERPL-MCNC: 4.1 G/DL (ref 3.9–4.9)
ALP SERPL-CCNC: 43 IU/L (ref 44–121)
ALT SERPL-CCNC: 17 IU/L (ref 0–32)
AST SERPL-CCNC: 20 IU/L (ref 0–40)
BILIRUB SERPL-MCNC: 0.4 MG/DL (ref 0–1.2)
BUN SERPL-MCNC: 9 MG/DL (ref 6–20)
BUN/CREAT SERPL: 15 (ref 9–23)
CALCIUM SERPL-MCNC: 8.8 MG/DL (ref 8.7–10.2)
CHLORIDE SERPL-SCNC: 105 MMOL/L (ref 96–106)
CO2 SERPL-SCNC: 23 MMOL/L (ref 20–29)
CREAT SERPL-MCNC: 0.62 MG/DL (ref 0.57–1)
CREAT UR-MCNC: 81.1 MG/DL
EGFR: 117 ML/MIN/1.73
GLOBULIN SER-MCNC: 2.3 G/DL (ref 1.5–4.5)
GLUCOSE SERPL-MCNC: 61 MG/DL (ref 70–99)
LDH SERPL-CCNC: 153 IU/L (ref 119–226)
POTASSIUM SERPL-SCNC: 4.4 MMOL/L (ref 3.5–5.2)
PROT SERPL-MCNC: 6.4 G/DL (ref 6–8.5)
PROT UR-MCNC: 7.4 MG/DL
PROT/CREAT UR: 91 MG/G CREAT (ref 0–200)
SODIUM SERPL-SCNC: 140 MMOL/L (ref 134–144)
URATE SERPL-MCNC: 3.1 MG/DL (ref 2.6–6.2)

## 2024-12-25 ENCOUNTER — RESULTS FOLLOW-UP (OUTPATIENT)
Dept: OTHER | Facility: HOSPITAL | Age: 39
End: 2024-12-25

## 2024-12-25 LAB
2ND TRIMESTER 4 SCREEN SERPL-IMP: NORMAL
AFP ADJ MOM SERPL: 1.37
AFP INTERP AMN-IMP: NORMAL
AFP INTERP SERPL-IMP: NORMAL
AFP INTERP SERPL-IMP: NORMAL
AFP SERPL-MCNC: 51.8 NG/ML
AGE AT DELIVERY: 38.4 YR
GA METHOD: NORMAL
GA: 18.1 WEEKS
IDDM PATIENT QL: NO
MULTIPLE PREGNANCY: NO
NEURAL TUBE DEFECT RISK FETUS: 3920 %

## 2024-12-25 NOTE — RESULT ENCOUNTER NOTE
Erika Coburn   Your labs results below returned as normal.     Baseline preeclamptic labs    Otilia Osborne MD

## 2025-01-09 ENCOUNTER — TELEPHONE (OUTPATIENT)
Dept: OBGYN CLINIC | Facility: CLINIC | Age: 40
End: 2025-01-09

## 2025-01-09 NOTE — TELEPHONE ENCOUNTER
Second Trimester Calls:  Attempt to call, L/m on pt's vm and [employer]com[employer] message sent.     Overall how are you doing?     Compliant with routine OB care appointments? Yes    Have you completed your 1st trimester labs? yes    If you had NIPS with MFM, do you have a order for MSAFP? No, will receive it on 01/15/25 appt.   Can be completed 15w-22w6d, ideally 16w-18w    Have you seen MFM and do you have your detailed US scheduled? 1/17/25    Pregnancy Education-have you had a chance to review the classes offered and registered?

## 2025-01-14 ENCOUNTER — ROUTINE PRENATAL (OUTPATIENT)
Dept: OBGYN CLINIC | Facility: CLINIC | Age: 40
End: 2025-01-14
Payer: COMMERCIAL

## 2025-01-14 VITALS
BODY MASS INDEX: 25.6 KG/M2 | DIASTOLIC BLOOD PRESSURE: 80 MMHG | HEIGHT: 72 IN | SYSTOLIC BLOOD PRESSURE: 118 MMHG | WEIGHT: 189 LBS

## 2025-01-14 DIAGNOSIS — O34.211 MATERNAL CARE DUE TO LOW TRANSVERSE UTERINE SCAR FROM PREVIOUS CESAREAN DELIVERY: Primary | ICD-10-CM

## 2025-01-14 DIAGNOSIS — Z3A.21 21 WEEKS GESTATION OF PREGNANCY: ICD-10-CM

## 2025-01-14 DIAGNOSIS — O09.812 PREGNANCY RESULTING FROM IN VITRO FERTILIZATION IN SECOND TRIMESTER: ICD-10-CM

## 2025-01-14 LAB
SL AMB  POCT GLUCOSE, UA: NORMAL
SL AMB POCT URINE PROTEIN: NORMAL

## 2025-01-14 PROCEDURE — PNV: Performed by: OBSTETRICS & GYNECOLOGY

## 2025-01-14 PROCEDURE — 81002 URINALYSIS NONAUTO W/O SCOPE: CPT | Performed by: OBSTETRICS & GYNECOLOGY

## 2025-01-14 RX ORDER — PROGESTERONE 200 MG/1
CAPSULE ORAL
COMMUNITY
Start: 2024-12-23 | End: 2025-02-05 | Stop reason: ALTCHOICE

## 2025-01-15 ENCOUNTER — ROUTINE PRENATAL (OUTPATIENT)
Dept: PERINATAL CARE | Facility: OTHER | Age: 40
End: 2025-01-15
Payer: COMMERCIAL

## 2025-01-15 VITALS
WEIGHT: 193.4 LBS | DIASTOLIC BLOOD PRESSURE: 76 MMHG | BODY MASS INDEX: 26.19 KG/M2 | SYSTOLIC BLOOD PRESSURE: 130 MMHG | HEIGHT: 72 IN | HEART RATE: 78 BPM

## 2025-01-15 DIAGNOSIS — O09.521 MULTIGRAVIDA OF ADVANCED MATERNAL AGE IN FIRST TRIMESTER: ICD-10-CM

## 2025-01-15 DIAGNOSIS — Z36.86 ENCOUNTER FOR ANTENATAL SCREENING FOR CERVICAL LENGTH: ICD-10-CM

## 2025-01-15 DIAGNOSIS — Z3A.21 21 WEEKS GESTATION OF PREGNANCY: Primary | ICD-10-CM

## 2025-01-15 DIAGNOSIS — Z33.1 PREGNANT STATE, INCIDENTAL: ICD-10-CM

## 2025-01-15 PROCEDURE — 76817 TRANSVAGINAL US OBSTETRIC: CPT | Performed by: OBSTETRICS & GYNECOLOGY

## 2025-01-15 PROCEDURE — 76811 OB US DETAILED SNGL FETUS: CPT | Performed by: OBSTETRICS & GYNECOLOGY

## 2025-01-15 PROCEDURE — 36415 COLL VENOUS BLD VENIPUNCTURE: CPT | Performed by: OBSTETRICS & GYNECOLOGY

## 2025-01-15 PROCEDURE — 99214 OFFICE O/P EST MOD 30 MIN: CPT | Performed by: OBSTETRICS & GYNECOLOGY

## 2025-01-15 NOTE — PROGRESS NOTES
Ultrasound Probe Disinfection    A transvaginal ultrasound was performed.   Prior to use, disinfection was performed with High Level Disinfection Process ([employer]).  Probe serial number [id number] was used.    Patti Good  01/15/25  12:47 PM

## 2025-01-15 NOTE — PROGRESS NOTES
Patient chose to have [facility] [id number] Non-Invasive Prenatal Screen [id number] PLUS w/ SCA, WITH fetal sex.  Patient choose to be billed through insurance.     Patient given brochure and is aware [facility] will contact patient's insurance and coordinate coverage.  Provided [facility] contact information. General inquiries [phone], Cost estimates [phone] and [facility] Billing [phone]. Website [email or web address].     Blood collection tubes labeled with patient identifiers (name, medical record number, and date of birth).     Filled out [facility] order form. Patient chose to have blood drawn in our office at time of visit. NIPS was drawn from right arm with a butterfly needle by PAMLIRA Oliveira MA. .      If patient chose to have blood work drawn at a [facility] lab we requested patient notify MFM (via phone call or [employer] message) when blood collected so office can follow up on results.       Maternal Fetal Medicine will have results in approximately 5-7 business days and will call patient or notify via [employer].  Patient aware viewing lab result online will reveal fetal sex if ordered.    Patient verbalized understanding of all instructions and no questions at this time.

## 2025-01-20 LAB
CFDNA.FET/CFDNA.TOTAL SFR FETUS: NORMAL %
CITATION REF LAB TEST: NORMAL
FET 13+18+21+X+Y ANEUP PLAS.CFDNA: NEGATIVE
FET CHR 21 TS PLAS.CFDNA QL: NEGATIVE
FET CHR 21 TS PLAS.CFDNA QL: NEGATIVE
FET MS X RISK WBC.DNA+CFDNA QL: NOT DETECTED
FET SEX PLAS.CFDNA DOSAGE CFDNA: NORMAL
FET TS 13 RISK PLAS.CFDNA QL: NEGATIVE
FET X + Y ANEUP RISK PLAS.CFDNA SEQ-IMP: NOT DETECTED
GA EST FROM CONCEPTION DATE: NORMAL D
GESTATIONAL AGE > 9:: YES
LAB DIRECTOR NAME PROVIDER: NORMAL
LAB DIRECTOR NAME PROVIDER: NORMAL
LABORATORY COMMENT REPORT: NORMAL
LIMITATIONS OF THE TEST: NORMAL
NEGATIVE PREDICTIVE VALUE: NORMAL
PERFORMANCE CHARACTERISTICS: NORMAL
POSITIVE PREDICTIVE VALUE: NORMAL
REF LAB TEST METHOD: NORMAL
SL AMB NOTE:: NORMAL
TEST PERFORMANCE INFO SPEC: NORMAL

## 2025-01-21 ENCOUNTER — RESULTS FOLLOW-UP (OUTPATIENT)
Facility: HOSPITAL | Age: 40
End: 2025-01-21

## 2025-01-21 NOTE — RESULT ENCOUNTER NOTE
I have reviewed the results of the NIPS which are low risk.  Please call patient and notify her of these reassuring results if she has not viewed on MyChart. Please ensure she is notified of recommendation of MSAFP to be ordered and followed up through her primary Obstetrician's office.      Thank you, Abraham Mays MD

## 2025-01-28 NOTE — PROGRESS NOTES
The patient was seen today for an ultrasound.  Please see ultrasound report (located under Ob Procedures) for additional details.   Thank you very much for allowing us to participate in the care of this very nice patient.  Should you have any questions, please do not hesitate to contact me.     Abraham Mays MD FACOG  Attending Physician, Maternal-Fetal Medicine  [facility]

## 2025-02-05 ENCOUNTER — ROUTINE PRENATAL (OUTPATIENT)
Dept: PERINATAL CARE | Facility: OTHER | Age: 40
End: 2025-02-05
Payer: COMMERCIAL

## 2025-02-05 VITALS
WEIGHT: 194.8 LBS | BODY MASS INDEX: 26.38 KG/M2 | HEART RATE: 88 BPM | SYSTOLIC BLOOD PRESSURE: 124 MMHG | HEIGHT: 72 IN | DIASTOLIC BLOOD PRESSURE: 66 MMHG

## 2025-02-05 DIAGNOSIS — Z3A.24 24 WEEKS GESTATION OF PREGNANCY: Primary | ICD-10-CM

## 2025-02-05 DIAGNOSIS — O09.812 PREGNANCY RESULTING FROM IN VITRO FERTILIZATION IN SECOND TRIMESTER: ICD-10-CM

## 2025-02-05 PROCEDURE — 99213 OFFICE O/P EST LOW 20 MIN: CPT | Performed by: OBSTETRICS & GYNECOLOGY

## 2025-02-05 PROCEDURE — 76827 ECHO EXAM OF FETAL HEART: CPT | Performed by: OBSTETRICS & GYNECOLOGY

## 2025-02-05 PROCEDURE — 93325 DOPPLER ECHO COLOR FLOW MAPG: CPT | Performed by: OBSTETRICS & GYNECOLOGY

## 2025-02-05 PROCEDURE — 76825 ECHO EXAM OF FETAL HEART: CPT | Performed by: OBSTETRICS & GYNECOLOGY

## 2025-02-05 NOTE — PROGRESS NOTES
The patient was seen today for an ultrasound.  Please see ultrasound report (located under Ob Procedures) for additional details.   Thank you very much for allowing us to participate in the care of this very nice patient.  Should you have any questions, please do not hesitate to contact me.     Abraham Mays MD FACOG  Attending Physician, Maternal-Fetal Medicine  [facility]

## 2025-02-09 NOTE — PROGRESS NOTES
"Routine Prenatal Visit  [facility] - [address], [address], [place] [address]    Assessment/Plan:  Erika is a 39 y.o. year old  at 21w2d who presents for routine prenatal visit.     1. Maternal care due to low transverse uterine scar from previous  delivery  2. Pregnancy resulting from in vitro fertilization in second trimester  3. 21 weeks gestation of pregnancy  -     POCT urine dip        Subjective:     CC: Prenatal care    Erika Coburn is a 39 y.o.  female who presents for routine prenatal care at 21w2d.  Pregnancy ROS: no leakage of fluid, pelvic pain, or vaginal bleeding.  normal fetal movement.    The following portions of the patient's history were reviewed and updated as appropriate: allergies, current medications, past family history, past medical history, obstetric history, gynecologic history, past social history, past surgical history and problem list.      Objective:  /80 (BP Location: Right arm, Patient Position: Sitting, Cuff Size: Standard)   Ht 6' 1\" (1.854 m)   Wt 85.7 kg (189 lb)   BMI 24.94 kg/m²   Pregravid Weight/BMI: 80.3 kg (177 lb) (BMI 23.36)  Current Weight: 85.7 kg (189 lb)   Total Weight Gain: 5.443 kg (12 lb)   Pre-Katelynn Vitals      Flowsheet Row Most Recent Value   Prenatal Assessment    Fetal Heart Rate 150   Fundal Height (cm) 22 cm   Movement Present   Prenatal Vitals    Blood Pressure 118/80   Weight - Scale 85.7 kg (189 lb)   Urine Albumin/Glucose    Dilation/Effacement/Station    Vaginal Drainage    Edema              General: Well appearing, no distress  Respiratory: Unlabored breathing  Cardiovascular: Regular rate.  Abdomen: Soft, gravid, nontender  Fundal Height: Appropriate for gestational age.  Extremities: Warm and well perfused.  Non tender.  "

## 2025-02-11 PROBLEM — Z87.59 HISTORY OF GESTATIONAL HYPERTENSION: Status: ACTIVE | Noted: 2025-02-11

## 2025-02-11 PROBLEM — Z3A.25 25 WEEKS GESTATION OF PREGNANCY: Status: ACTIVE | Noted: 2022-11-15

## 2025-02-12 ENCOUNTER — ROUTINE PRENATAL (OUTPATIENT)
Dept: OBGYN CLINIC | Facility: CLINIC | Age: 40
End: 2025-02-12
Payer: COMMERCIAL

## 2025-02-12 VITALS
DIASTOLIC BLOOD PRESSURE: 84 MMHG | SYSTOLIC BLOOD PRESSURE: 128 MMHG | WEIGHT: 190 LBS | HEIGHT: 72 IN | BODY MASS INDEX: 25.73 KG/M2

## 2025-02-12 DIAGNOSIS — O09.812 PREGNANCY RESULTING FROM IN VITRO FERTILIZATION IN SECOND TRIMESTER: Primary | ICD-10-CM

## 2025-02-12 DIAGNOSIS — O34.211 MATERNAL CARE DUE TO LOW TRANSVERSE UTERINE SCAR FROM PREVIOUS CESAREAN DELIVERY: ICD-10-CM

## 2025-02-12 DIAGNOSIS — Z67.91 RH NEGATIVE STATE IN ANTEPARTUM PERIOD: ICD-10-CM

## 2025-02-12 DIAGNOSIS — Z87.59 HISTORY OF GESTATIONAL HYPERTENSION: ICD-10-CM

## 2025-02-12 DIAGNOSIS — Z36.89 ENCOUNTER FOR OTHER SPECIFIED ANTENATAL SCREENING: ICD-10-CM

## 2025-02-12 DIAGNOSIS — O26.899 RH NEGATIVE STATE IN ANTEPARTUM PERIOD: ICD-10-CM

## 2025-02-12 DIAGNOSIS — Z3A.25 25 WEEKS GESTATION OF PREGNANCY: ICD-10-CM

## 2025-02-12 LAB
SL AMB  POCT GLUCOSE, UA: NORMAL
SL AMB POCT URINE PROTEIN: NORMAL

## 2025-02-12 PROCEDURE — 81002 URINALYSIS NONAUTO W/O SCOPE: CPT | Performed by: OBSTETRICS & GYNECOLOGY

## 2025-02-12 PROCEDURE — PNV: Performed by: OBSTETRICS & GYNECOLOGY

## 2025-02-12 NOTE — PROGRESS NOTES
"Routine Prenatal Visit  [facility] - [place] [address], [address], [place] [address]    Assessment/Plan:  Erika is a 39 y.o. year old  at 25w3d who presents for routine prenatal visit.     Assessment & Plan  Pregnancy resulting from in vitro fertilization in second trimester  Normal fetal echo.  Growth u/s 2025.         Maternal care due to low transverse uterine scar from previous  delivery  Wishes repeat LTCS, msg to  to call and schedule at 39 weeks.         Rh negative state in antepartum period  Has orders for T&S at 28 weeks.  Reviewed if done before visit can get Rhogam in office.         History of gestational hypertension  Taking ASA.  Normal BP.         Encounter for other specified  screening    Orders:    Glucose, 1H PG; Future    ABO/Rh; Future    Antibody screen; Future    CBC; Future    RPR, Rfx Qn RPR/Confirm TP; Future    25 weeks gestation of pregnancy    Orders:    POCT urine dip      Next OB Visit 4 weeks.    Subjective:     CC: Prenatal care    Erika Coburn is a 39 y.o.  female who presents for routine prenatal care at 25w3d.  Pregnancy ROS: no leakage of fluid, pelvic pain, or vaginal bleeding.  normal fetal movement.    The following portions of the patient's history were reviewed and updated as appropriate: allergies, current medications, past family history, past medical history, obstetric history, gynecologic history, past social history, past surgical history and problem list.      Objective:  /84 (BP Location: Left arm, Patient Position: Sitting, Cuff Size: Standard)   Ht 6' 1\" (1.854 m)   Wt 86.2 kg (190 lb)   BMI 25.07 kg/m²   Pregravid Weight/BMI: 80.3 kg (177 lb) (BMI 23.36)  Current Weight: 86.2 kg (190 lb)   Total Weight Gain: 5.897 kg (13 lb)   Pre-Katelynn Vitals      Flowsheet Row Most Recent Value   Prenatal Assessment    Fetal Heart Rate 145   Fundal Height (cm) 24 cm   Movement Present   Prenatal " Vitals    Blood Pressure 128/84   Weight - Scale 86.2 kg (190 lb)   Urine Albumin/Glucose    Dilation/Effacement/Station    Vaginal Drainage    Edema    LLE Edema None   RLE Edema None             General: Well appearing, no distress  Abdomen: Soft, gravid, nontender  Extremities: Non tender.

## 2025-02-18 ENCOUNTER — TELEPHONE (OUTPATIENT)
Dept: OBGYN CLINIC | Facility: CLINIC | Age: 40
End: 2025-02-18

## 2025-02-18 NOTE — TELEPHONE ENCOUNTER
----- Message from Janel Rasmussen MD sent at 2/12/2025 11:00 AM EST -----  Regarding: LTCS  Please call to schedule repeat LTCS at 39 weeks.

## 2025-02-26 LAB
EXTERNAL ANTIBODY SCREEN: NORMAL
EXTERNAL HEMOGLOBIN: 12.2 G/DL
EXTERNAL PLATELET COUNT: 257 K/ÂΜL
EXTERNAL RH FACTOR: NEGATIVE
EXTERNAL SYPHILIS TOTAL IGG/IGM SCREENING: NORMAL

## 2025-02-27 ENCOUNTER — RESULTS FOLLOW-UP (OUTPATIENT)
Dept: OBGYN CLINIC | Facility: CLINIC | Age: 40
End: 2025-02-27

## 2025-02-27 LAB
ABO GROUP BLD: NORMAL
BLD GP AB SCN SERPL QL: NEGATIVE
ERYTHROCYTE [DISTWIDTH] IN BLOOD BY AUTOMATED COUNT: 12.5 % (ref 11.7–15.4)
GLUCOSE 1H P 50 G GLC PO SERPL-MCNC: 68 MG/DL (ref 70–139)
HCT VFR BLD AUTO: 36.6 % (ref 34–46.6)
HGB BLD-MCNC: 12.2 G/DL (ref 11.1–15.9)
MCH RBC QN AUTO: 32.7 PG (ref 26.6–33)
MCHC RBC AUTO-ENTMCNC: 33.3 G/DL (ref 31.5–35.7)
MCV RBC AUTO: 98 FL (ref 79–97)
PLATELET # BLD AUTO: 257 X10E3/UL (ref 150–450)
RBC # BLD AUTO: 3.73 X10E6/UL (ref 3.77–5.28)
RH BLD: NEGATIVE
RPR SER QL: NON REACTIVE
WBC # BLD AUTO: 11.1 X10E3/UL (ref 3.4–10.8)

## 2025-03-05 ENCOUNTER — ROUTINE PRENATAL (OUTPATIENT)
Dept: OBGYN CLINIC | Facility: CLINIC | Age: 40
End: 2025-03-05
Payer: COMMERCIAL

## 2025-03-05 VITALS
WEIGHT: 198 LBS | BODY MASS INDEX: 26.82 KG/M2 | HEIGHT: 72 IN | DIASTOLIC BLOOD PRESSURE: 84 MMHG | SYSTOLIC BLOOD PRESSURE: 128 MMHG

## 2025-03-05 DIAGNOSIS — O09.291: ICD-10-CM

## 2025-03-05 DIAGNOSIS — Z87.59 HISTORY OF GESTATIONAL HYPERTENSION: ICD-10-CM

## 2025-03-05 DIAGNOSIS — Z3A.28 28 WEEKS GESTATION OF PREGNANCY: ICD-10-CM

## 2025-03-05 DIAGNOSIS — O34.211 MATERNAL CARE DUE TO LOW TRANSVERSE UTERINE SCAR FROM PREVIOUS CESAREAN DELIVERY: ICD-10-CM

## 2025-03-05 DIAGNOSIS — Z67.91 RH NEGATIVE STATE IN ANTEPARTUM PERIOD: ICD-10-CM

## 2025-03-05 DIAGNOSIS — O09.813 PREGNANCY RESULTING FROM IN VITRO FERTILIZATION IN THIRD TRIMESTER: Primary | ICD-10-CM

## 2025-03-05 DIAGNOSIS — O09.523 MULTIGRAVIDA OF ADVANCED MATERNAL AGE IN THIRD TRIMESTER: ICD-10-CM

## 2025-03-05 DIAGNOSIS — O26.899 RH NEGATIVE STATE IN ANTEPARTUM PERIOD: ICD-10-CM

## 2025-03-05 DIAGNOSIS — O31.10X0 VANISHING TWIN SYNDROME: ICD-10-CM

## 2025-03-05 PROBLEM — O09.819 PREGNANCY RESULTING FROM IN VITRO FERTILIZATION: Status: ACTIVE | Noted: 2022-09-19

## 2025-03-05 PROBLEM — O09.529 MATERNAL AGE > 35, MULTIGRAVIDA: Status: ACTIVE | Noted: 2024-11-11

## 2025-03-05 LAB
SL AMB  POCT GLUCOSE, UA: NORMAL
SL AMB POCT URINE PROTEIN: NORMAL

## 2025-03-05 PROCEDURE — PNV: Performed by: STUDENT IN AN ORGANIZED HEALTH CARE EDUCATION/TRAINING PROGRAM

## 2025-03-05 PROCEDURE — 96372 THER/PROPH/DIAG INJ SC/IM: CPT | Performed by: STUDENT IN AN ORGANIZED HEALTH CARE EDUCATION/TRAINING PROGRAM

## 2025-03-05 PROCEDURE — 81002 URINALYSIS NONAUTO W/O SCOPE: CPT | Performed by: STUDENT IN AN ORGANIZED HEALTH CARE EDUCATION/TRAINING PROGRAM

## 2025-03-05 NOTE — ASSESSMENT & PLAN NOTE
- Rhogam administered today.   Orders:  •  Rho(D) immune globulin (RHOGAM ULTRA-FILTERED PLUS) IM injection 300 mcg

## 2025-03-05 NOTE — PROGRESS NOTES
"Routine Prenatal Visit  [facility] - [place]  [address] [place] [address]  Assessment & Plan  Pregnancy resulting from in vitro fertilization in third trimester  - Normal fetal echo.   - Plan for 3rd trimester growth US and APFS.        Maternal care due to low transverse uterine scar from previous  delivery  - Declines TOLAC. Repeat  delivery scheduled 2025.       Current pregnancy in first trimester with history of placenta previa during prior pregnancy         Rh negative state in antepartum period  - Rhogam administered today.   Orders:  •  Rho(D) immune globulin (RHOGAM ULTRA-FILTERED PLUS) IM injection 300 mcg    Multigravida of advanced maternal age in third trimester         History of gestational hypertension  - Continue  mg PO daily until 36 weeks for pre-eclampsia risk reduction.        Vanishing twin syndrome         28 weeks gestation of pregnancy  - PTL/PPROM/Bleeding precautions given. Kick counts reviewed  - Third trimester labs reviewed.  - Recommendation for administration of TDaP was reviewed. Defers to next visit.  - Problem list updated, results console reviewed and updated with pertinent prenatal labs.  - PMH, PSH, medications reviewed and updated as needed  - Return to office in 2 wk(s) for routine prenatal care    Orders:  •  POCT urine dip    Subjective:   Erika Coburn is a 39 y.o.  who presents for routine prenatal care at 28w3d.  Complaints today: None  LOF: No; VB: No; Contractions: No; FM: Present    Objective:  /84 (BP Location: Right arm, Patient Position: Sitting, Cuff Size: Standard)   Ht 6' 1\" (1.854 m)   Wt 89.8 kg (198 lb)   BMI 26.12 kg/m²     General: Well appearing, no distress  Respiratory: Unlabored breathing  Cardiovascular: Regular rate.  Abdomen: Soft, gravid, nontender  Extremities: Warm and well perfused.  Non tender.    Pregravid Weight/BMI: 80.3 kg (177 lb) (BMI 23.36)  Current Weight: 89.8 kg " (198 lb)   Total Weight Gain: 9.526 kg (21 lb)     Pre-Katelynn Vitals    Flowsheet Row Most Recent Value   Prenatal Assessment    Fetal Heart Rate 150   Fundal Height (cm) 28 cm   Movement Present   Presentation Vertex   Prenatal Vitals    Blood Pressure 128/84   Weight - Scale 89.8 kg (198 lb)   Urine Albumin/Glucose    Dilation/Effacement/Station    Vaginal Drainage    Draining Fluid No   Edema    LLE Edema None   RLE Edema None   Facial Edema None           Gregory Moore MD  3/5/2025 11:12 AM

## 2025-03-05 NOTE — ASSESSMENT & PLAN NOTE
- PTL/PPROM/Bleeding precautions given. Kick counts reviewed  - Third trimester labs reviewed.  - Recommendation for administration of TDaP was reviewed. Defers to next visit.  - Problem list updated, results console reviewed and updated with pertinent prenatal labs.  - PMH, PSH, medications reviewed and updated as needed  - Return to office in 2 wk(s) for routine prenatal care    Orders:  •  POCT urine dip

## 2025-03-13 LAB
DME PARACHUTE DELIVERY DATE REQUESTED: NORMAL
DME PARACHUTE ITEM DESCRIPTION: NORMAL
DME PARACHUTE ORDER STATUS: NORMAL
DME PARACHUTE SUPPLIER NAME: NORMAL
DME PARACHUTE SUPPLIER PHONE: NORMAL

## 2025-03-19 ENCOUNTER — ROUTINE PRENATAL (OUTPATIENT)
Dept: OBGYN CLINIC | Facility: CLINIC | Age: 40
End: 2025-03-19
Payer: COMMERCIAL

## 2025-03-19 VITALS
BODY MASS INDEX: 27.36 KG/M2 | WEIGHT: 202 LBS | HEIGHT: 72 IN | SYSTOLIC BLOOD PRESSURE: 122 MMHG | DIASTOLIC BLOOD PRESSURE: 66 MMHG

## 2025-03-19 DIAGNOSIS — O26.899 RH NEGATIVE STATE IN ANTEPARTUM PERIOD: ICD-10-CM

## 2025-03-19 DIAGNOSIS — Z67.91 RH NEGATIVE STATE IN ANTEPARTUM PERIOD: ICD-10-CM

## 2025-03-19 DIAGNOSIS — O34.211 MATERNAL CARE DUE TO LOW TRANSVERSE UTERINE SCAR FROM PREVIOUS CESAREAN DELIVERY: Primary | ICD-10-CM

## 2025-03-19 DIAGNOSIS — Z23 NEED FOR TDAP VACCINATION: ICD-10-CM

## 2025-03-19 DIAGNOSIS — O09.813 PREGNANCY RESULTING FROM IN VITRO FERTILIZATION IN THIRD TRIMESTER: ICD-10-CM

## 2025-03-19 DIAGNOSIS — Z3A.30 30 WEEKS GESTATION OF PREGNANCY: ICD-10-CM

## 2025-03-19 LAB
SL AMB  POCT GLUCOSE, UA: NORMAL
SL AMB POCT URINE PROTEIN: NORMAL

## 2025-03-19 PROCEDURE — 81002 URINALYSIS NONAUTO W/O SCOPE: CPT | Performed by: OBSTETRICS & GYNECOLOGY

## 2025-03-19 PROCEDURE — 90471 IMMUNIZATION ADMIN: CPT | Performed by: OBSTETRICS & GYNECOLOGY

## 2025-03-19 PROCEDURE — 90715 TDAP VACCINE 7 YRS/> IM: CPT | Performed by: OBSTETRICS & GYNECOLOGY

## 2025-03-19 PROCEDURE — PNV: Performed by: OBSTETRICS & GYNECOLOGY

## 2025-03-19 NOTE — PROGRESS NOTES
"Routine Prenatal Visit  [facility] - [address], [address], [place] [address]    Assessment/Plan:  Erika is a 39 y.o. year old  at 30w3d who presents for routine prenatal visit.     1. Maternal care due to low transverse uterine scar from previous  delivery  2. Rh negative state in antepartum period  3. Pregnancy resulting from in vitro fertilization in third trimester  4. 30 weeks gestation of pregnancy  -     POCT urine dip  5. Need for Tdap vaccination  -     TDAP VACCINE GREATER THAN OR EQUAL TO 8YO IM        Subjective:     CC: Prenatal care    Erika Coburn is a 39 y.o.  female who presents for routine prenatal care at 30w3d.  Pregnancy ROS: no leakage of fluid, pelvic pain, or vaginal bleeding.  normal fetal movement.    The following portions of the patient's history were reviewed and updated as appropriate: allergies, current medications, past family history, past medical history, obstetric history, gynecologic history, past social history, past surgical history and problem list.      Objective:  /66 (BP Location: Left arm, Patient Position: Sitting, Cuff Size: Large)   Ht 6' 1\" (1.854 m)   Wt 91.6 kg (202 lb)   BMI 26.65 kg/m²   Pregravid Weight/BMI: 80.3 kg (177 lb) (BMI 23.36)  Current Weight: 91.6 kg (202 lb)   Total Weight Gain: 11.3 kg (25 lb)   Pre-Katelynn Vitals      Flowsheet Row Most Recent Value   Prenatal Assessment    Fetal Heart Rate 135   Fundal Height (cm) 30 cm   Movement Present   Presentation Vertex   Prenatal Vitals    Blood Pressure 122/66   Weight - Scale 91.6 kg (202 lb)   Urine Albumin/Glucose    Dilation/Effacement/Station    Vaginal Drainage    Edema              General: Well appearing, no distress  Respiratory: Unlabored breathing  Cardiovascular: Regular rate.  Abdomen: Soft, gravid, nontender  Fundal Height: Appropriate for gestational age.  Extremities: Warm and well perfused.  Non tender.  "

## 2025-03-20 ENCOUNTER — TELEPHONE (OUTPATIENT)
Dept: OBGYN CLINIC | Facility: CLINIC | Age: 40
End: 2025-03-20

## 2025-03-20 NOTE — TELEPHONE ENCOUNTER
Third Trimester Call (30-34 weeks)   Attempt to call. Left message on [employer] and [employer] message sent.     Overall how are you feeling?     Compliant with routine OB appointments? Yes    Have you completed your 3rd trimester lab work? Yes    Have you reviewed the contents of 3rd trimester folder from office?    Have you decided on a pediatrician?     If yes, who     If no, reviewed practices and transferred call to [phone] to set up appointment with pediatric office.     Questions on paperwork to go back to office?    Questions on the baby birth certificate and photography forms?       Send link for the Hospital Readiness Video via [employer]

## 2025-03-24 LAB
DME PARACHUTE DELIVERY DATE ACTUAL: NORMAL
DME PARACHUTE DELIVERY DATE REQUESTED: NORMAL
DME PARACHUTE ITEM DESCRIPTION: NORMAL
DME PARACHUTE ORDER STATUS: NORMAL
DME PARACHUTE SUPPLIER NAME: NORMAL
DME PARACHUTE SUPPLIER PHONE: NORMAL

## 2025-03-31 NOTE — PATIENT INSTRUCTIONS
"Patient Education     Your baby's movement before birth   The Basics   Written by the doctors and editors at [facility]   When should I start feeling my baby move? -- It depends. Most people first feel their baby moving in the uterus between about 16 and 20 weeks of pregnancy. It might take longer to feel movement if this is your first pregnancy or if the placenta is in the front of your uterus.  What kinds of movements should I feel? -- When you first feel your baby move, it might feel like a gentle flutter in your belly. This is sometimes called \"quickening.\" As the baby grows, their movements will get stronger. You will probably feel them kicking, rolling, and stretching. Later in pregnancy, you might be able to see and feel the baby moving from the outside.  You might notice that your baby is more active at certain times of the day or night. Even before birth, babies have periods of being asleep and awake. When your baby is sleeping, you might notice that they do not move as much.  Should I keep track of my baby's movements? -- If your pregnancy is healthy, you probably do not need to count or record your baby's movements. Feeling regular movement is a good sign that the baby is doing well.  In some cases, your doctor or midwife might ask you to keep track of your baby's movements. If so, they will tell you how to do this and when to call them.  A change in your baby's movements does not always mean that there is a problem. But in some cases, it can be a sign that the baby is having trouble. If your doctor or midwife is concerned, they can do tests to check on the baby.  If I am asked to track movement, how should I do it? -- There are different ways of tracking your baby's movement. This is sometimes called \"kick counting.\"  Your doctor or midwife will tell you exactly what to track. For example, they might ask you to write down:   How long it takes to feel 10 kicks or movements   How many times your baby moves " in 1 hour  Many experts consider at least 10 movements in 2 hours to be a sign that the baby is doing well. But there is no specific cutoff for exactly how much movement is healthy or unhealthy. Some babies are more active than others, and some pregnant people feel movement more easily than others. The main goal of kick counting is to get to know your baby's normal patterns so you can tell if anything changes.  If you are doing kick counting:   Choose a time of day when your baby is usually active.   Find a quiet place where you will not be distracted.   Lie down on your side in a comfortable position.   Check the clock, or set a timer.   Each time you feel your baby move or kick, write down the time. Some people use a smartphone nader to keep track.   If your baby seems less active than usual, try moving around, eating a snack, and emptying your bladder. This can help wake the baby up if they are asleep.   Stop counting after you have felt 10 kicks, or after the length of time your doctor or midwife told you.  When should I call the doctor? -- Call your doctor or midwife for advice if:   You have concerns about your baby's movement.   Your baby is moving less than they normally do.   You notice a sudden change in the pattern of your baby's movements.   You have any other symptoms that worry you.  All topics are updated as new evidence becomes available and our peer review process is complete.  This topic retrieved from [employer] on: Feb 26, 2024.  Topic 307796 Version 1.0  Release: 32.2.4 - C32.56  © 2024 UpToDate, Inc. and/or its affiliates. All rights reserved.  Consumer Information Use and Disclaimer   Disclaimer: This generalized information is a limited summary of diagnosis, treatment, and/or medication information. It is not meant to be comprehensive and should be used as a tool to help the user understand and/or assess potential diagnostic and treatment options. It does NOT include all information about  conditions, treatments, medications, side effects, or risks that may apply to a specific patient. It is not intended to be medical advice or a substitute for the medical advice, diagnosis, or treatment of a health care provider based on the health care provider's examination and assessment of a patient's specific and unique circumstances. Patients must speak with a health care provider for complete information about their health, medical questions, and treatment options, including any risks or benefits regarding use of medications. This information does not endorse any treatments or medications as safe, effective, or approved for treating a specific patient. UpToDate, Inc. and its affiliates disclaim any warranty or liability relating to this information or the use thereof.The use of this information is governed by the Terms of Use, available at https://www.[employer].com/en/know/clinical-effectiveness-terms. © UpToDate, Inc. and its affiliates and/or licensors. All rights reserved.  Copyright   ©  UpToDate, Inc. and/or its affiliates. All rights reserved.  Thank you for choosing us for your  care today.  If you have any questions about your ultrasound or care, please do not hesitate to contact us or your primary obstetrician.        Some general instructions for your pregnancy are:    Exercise: Aim for 150 minutes per week of regular exercise.  Walking is great!  Nutrition: Choose healthy sources of calcium, iron, and protein.  Avoid ultraprocessed foods and added sugar.  Learn about Preeclampsia: preeclampsia is a common, potentially serious high blood pressure complication in pregnancy.  A blood pressure of 140mmHg (systolic or top number) or 90mmHg (diastolic or bottom number) should be evaluated by your doctor.  Aspirin is sometimes prescribed in early pregnancy to prevent preeclampsia in women with risk factors - ask your obstetrician if you should be on this medication.  For more  resources, visit:  https://www.highriskpregnancyinfo.org/preeclampsia  If you smoke, please try to quit completely but also try to reduce your smoking by as much as possible (as soon as possible).  Do not vape.  Please also avoid cannabis products.  Other warning signs to watch out for in pregnancy or postpartum: chest pain, obstructed breathing or shortness of breath, seizures, thoughts of hurting yourself or your baby, bleeding, a painful or swollen leg, fever, or headache (see [employer] POST-BIRTH Warning Signs campaign).  If these happen call 911.  Itching is also not normal in pregnancy and if you experience this, especially over your hands and feet, potentially worse at night, notify your doctors.

## 2025-04-02 ENCOUNTER — ROUTINE PRENATAL (OUTPATIENT)
Dept: OBGYN CLINIC | Facility: CLINIC | Age: 40
End: 2025-04-02
Payer: COMMERCIAL

## 2025-04-02 ENCOUNTER — ULTRASOUND (OUTPATIENT)
Dept: PERINATAL CARE | Facility: OTHER | Age: 40
End: 2025-04-02
Payer: COMMERCIAL

## 2025-04-02 VITALS
HEIGHT: 72 IN | SYSTOLIC BLOOD PRESSURE: 136 MMHG | HEART RATE: 81 BPM | BODY MASS INDEX: 27.74 KG/M2 | WEIGHT: 204.8 LBS | DIASTOLIC BLOOD PRESSURE: 74 MMHG

## 2025-04-02 VITALS
DIASTOLIC BLOOD PRESSURE: 86 MMHG | WEIGHT: 202 LBS | SYSTOLIC BLOOD PRESSURE: 122 MMHG | HEIGHT: 72 IN | BODY MASS INDEX: 27.36 KG/M2

## 2025-04-02 DIAGNOSIS — O09.523 ELDERLY MULTIGRAVIDA, THIRD TRIMESTER: Primary | ICD-10-CM

## 2025-04-02 DIAGNOSIS — Z3A.32 32 WEEKS GESTATION OF PREGNANCY: ICD-10-CM

## 2025-04-02 DIAGNOSIS — O31.10X0 VANISHING TWIN SYNDROME: ICD-10-CM

## 2025-04-02 DIAGNOSIS — Z67.91 RH NEGATIVE STATE IN ANTEPARTUM PERIOD: ICD-10-CM

## 2025-04-02 DIAGNOSIS — O26.899 RH NEGATIVE STATE IN ANTEPARTUM PERIOD: ICD-10-CM

## 2025-04-02 DIAGNOSIS — Z87.59 HISTORY OF GESTATIONAL HYPERTENSION: ICD-10-CM

## 2025-04-02 DIAGNOSIS — O09.813 PREGNANCY RESULTING FROM IN VITRO FERTILIZATION IN THIRD TRIMESTER: Primary | ICD-10-CM

## 2025-04-02 DIAGNOSIS — O09.813 PREGNANCY RESULTING FROM IN VITRO FERTILIZATION IN THIRD TRIMESTER: ICD-10-CM

## 2025-04-02 DIAGNOSIS — O34.211 MATERNAL CARE DUE TO LOW TRANSVERSE UTERINE SCAR FROM PREVIOUS CESAREAN DELIVERY: ICD-10-CM

## 2025-04-02 DIAGNOSIS — O09.523 MULTIGRAVIDA OF ADVANCED MATERNAL AGE IN THIRD TRIMESTER: ICD-10-CM

## 2025-04-02 LAB
SL AMB  POCT GLUCOSE, UA: NORMAL
SL AMB POCT URINE PROTEIN: NORMAL

## 2025-04-02 PROCEDURE — 76816 OB US FOLLOW-UP PER FETUS: CPT | Performed by: OBSTETRICS & GYNECOLOGY

## 2025-04-02 PROCEDURE — 81002 URINALYSIS NONAUTO W/O SCOPE: CPT | Performed by: STUDENT IN AN ORGANIZED HEALTH CARE EDUCATION/TRAINING PROGRAM

## 2025-04-02 PROCEDURE — 99214 OFFICE O/P EST MOD 30 MIN: CPT | Performed by: OBSTETRICS & GYNECOLOGY

## 2025-04-02 PROCEDURE — PNV: Performed by: STUDENT IN AN ORGANIZED HEALTH CARE EDUCATION/TRAINING PROGRAM

## 2025-04-02 NOTE — ASSESSMENT & PLAN NOTE
- PTL/PPROM/Bleeding precautions given. Kick counts reviewed.  - Problem list updated, results console reviewed and updated with pertinent prenatal labs.  - PMH, PSH, medications reviewed and updated as needed  - Return to office in 2 wk(s) for routine prenatal care    Orders:  •  POCT urine dip

## 2025-04-02 NOTE — LETTER
April 2, 2025     Raf Tse MD  [address]  [address]  [place] [address]    Patient: Erika Coburn   YOB: 1985   Date of Visit: 4/2/2025       Dear Dr. Raf Tse MD:    Thank you for referring Erika Coburn to me for evaluation. Below are my notes for this consultation.    If you have questions, please do not hesitate to call me. I look forward to following your patient along with you.         Sincerely,        Joon Curry MD        CC: No Recipients    Joon Curry MD  4/2/2025 11:37 AM  Sign when Signing Visit  Please refer to the [facility] ultrasound report in Ob Procedures for additional information regarding today's visit

## 2025-04-02 NOTE — PROGRESS NOTES
"Routine Prenatal Visit  [facility] - [place] [address], [address], [place] [address]  Assessment & Plan  Pregnancy resulting from in vitro fertilization in third trimester  - Growth US scheduled today with MFM.   - Plan for weekly APFS beginning at 36 weeks.        Maternal care due to low transverse uterine scar from previous  delivery  - Repeat cesaean scheduled. Declines TOLAC.        Rh negative state in antepartum period  - Rhogam administered 3/5/2025       Multigravida of advanced maternal age in third trimester         History of gestational hypertension  - Continue  mg PO daily until 36 weeks for pre-eclampsia risk reduction.        Vanishing twin syndrome         32 weeks gestation of pregnancy  - PTL/PPROM/Bleeding precautions given. Kick counts reviewed.  - Problem list updated, results console reviewed and updated with pertinent prenatal labs.  - PMH, PSH, medications reviewed and updated as needed  - Return to office in 2 wk(s) for routine prenatal care    Orders:  •  POCT urine dip    Subjective:   Erika Coburn is a 39 y.o.  who presents for routine prenatal care at 32w3d.  Complaints today: None  LOF: No; VB: No; Contractions: No; FM: Present and normal    Objective:  /86 (BP Location: Left arm, Patient Position: Sitting, Cuff Size: Standard)   Ht 6' 1\" (1.854 m)   Wt 91.6 kg (202 lb)   BMI 26.65 kg/m²     General: Well appearing, no distress  Respiratory: Unlabored breathing  Cardiovascular: Regular rate.  Abdomen: Soft, gravid, nontender  Extremities: Warm and well perfused.  Non tender.    Pregravid Weight/BMI: 80.3 kg (177 lb) (BMI 23.36)  Current Weight: 91.6 kg (202 lb)   Total Weight Gain: 11.3 kg (25 lb)     Pre- Vitals    Flowsheet Row Most Recent Value   Prenatal Assessment    Fetal Heart Rate 145   Fundal Height (cm) 32 cm   Movement Present   Prenatal Vitals    Blood Pressure 122/86   Weight - Scale 91.6 kg (202 lb)   Urine " Albumin/Glucose    Dilation/Effacement/Station    Vaginal Drainage    Draining Fluid No   Edema    LLE Edema None   RLE Edema None   Facial Edema None           Gregory Moore MD  4/2/2025 10:00 AM    [Alert] : alert [Normal Voice/Communication] : normal voice/communication [Healthy Appearing] : healthy appearing [No Acute Distress] : no acute distress [Sclera] : the sclera and conjunctiva were normal [Hearing Threshold Finger Rub Not [place]] : hearing was normal [Normal Lips/Gums] : the lips and gums were normal [Oropharynx] : the oropharynx was normal [Normal Appearance] : the appearance of the neck was normal [No Neck Mass] : no neck mass was observed [No Respiratory Distress] : no respiratory distress [No Acc Muscle Use] : no accessory muscle use [Respiration, Rhythm And Depth] : normal respiratory rhythm and effort [Auscultation Breath Sounds / Voice Sounds] : lungs were clear to auscultation bilaterally [Heart Rate And Rhythm] : heart rate was normal and rhythm regular [Normal S1, S2] : normal S1 and S2 [Murmurs] : no murmurs [Bowel Sounds] : normal bowel sounds [Abdomen Tenderness] : non-tender [No Masses] : no abdominal mass palpated [Abdomen Soft] : soft [] : no hepatosplenomegaly [Oriented To Time, Place, And Person] : oriented to person, place, and time

## 2025-04-16 ENCOUNTER — ROUTINE PRENATAL (OUTPATIENT)
Dept: OBGYN CLINIC | Facility: CLINIC | Age: 40
End: 2025-04-16
Payer: COMMERCIAL

## 2025-04-16 VITALS
BODY MASS INDEX: 27.52 KG/M2 | SYSTOLIC BLOOD PRESSURE: 118 MMHG | WEIGHT: 203.2 LBS | DIASTOLIC BLOOD PRESSURE: 72 MMHG | HEIGHT: 72 IN

## 2025-04-16 DIAGNOSIS — O09.813 PREGNANCY RESULTING FROM IN VITRO FERTILIZATION IN THIRD TRIMESTER: Primary | ICD-10-CM

## 2025-04-16 DIAGNOSIS — Z67.91 RH NEGATIVE STATE IN ANTEPARTUM PERIOD: ICD-10-CM

## 2025-04-16 DIAGNOSIS — Z3A.34 34 WEEKS GESTATION OF PREGNANCY: ICD-10-CM

## 2025-04-16 DIAGNOSIS — O26.899 RH NEGATIVE STATE IN ANTEPARTUM PERIOD: ICD-10-CM

## 2025-04-16 DIAGNOSIS — O09.291: ICD-10-CM

## 2025-04-16 DIAGNOSIS — O31.10X0 VANISHING TWIN SYNDROME: ICD-10-CM

## 2025-04-16 DIAGNOSIS — O34.211 MATERNAL CARE DUE TO LOW TRANSVERSE UTERINE SCAR FROM PREVIOUS CESAREAN DELIVERY: ICD-10-CM

## 2025-04-16 DIAGNOSIS — O09.523 MULTIGRAVIDA OF ADVANCED MATERNAL AGE IN THIRD TRIMESTER: ICD-10-CM

## 2025-04-16 DIAGNOSIS — Z87.59 HISTORY OF GESTATIONAL HYPERTENSION: ICD-10-CM

## 2025-04-16 LAB
SL AMB  POCT GLUCOSE, UA: NORMAL
SL AMB POCT URINE PROTEIN: NORMAL

## 2025-04-16 PROCEDURE — 81002 URINALYSIS NONAUTO W/O SCOPE: CPT | Performed by: STUDENT IN AN ORGANIZED HEALTH CARE EDUCATION/TRAINING PROGRAM

## 2025-04-16 PROCEDURE — PNV: Performed by: STUDENT IN AN ORGANIZED HEALTH CARE EDUCATION/TRAINING PROGRAM

## 2025-04-16 NOTE — ASSESSMENT & PLAN NOTE
- PTL/PPROM/Bleeding precautions given. Kick counts reviewed.  - Reviewed plan for collection of GBS swab at 36 weeks.  - Problem list updated, results console reviewed and updated with pertinent prenatal labs.  - PMH, PSH, medications reviewed and updated as needed  - Return to office in 2 wk(s) for routine prenatal care    Orders:  •  POCT urine dip   How Severe Are Your Spot(S)?: mild Have Your Spot(S) Been Treated In The Past?: has not been treated Hpi Title: Evaluation of Skin Lesions Year Removed: 1900

## 2025-04-16 NOTE — PROGRESS NOTES
"Routine Prenatal Visit  [facility] - [place] [address], [address], [place] [address]  Assessment & Plan  Pregnancy resulting from in vitro fertilization in third trimester  - Growth ultrasound and weekly APFS scheduled with MFM beginning at 36 weeks.        Maternal care due to low transverse uterine scar from previous  delivery  - Repeat  delivery is scheduled. Patient declines TOLAC.        Rh negative state in antepartum period  - Rhogam administered 3/5/2025.       Multigravida of advanced maternal age in third trimester         History of gestational hypertension  - Continue  mg PO daily until 36 weeks for pre-eclampsia risk reduction.        34 weeks gestation of pregnancy  - PTL/PPROM/Bleeding precautions given. Kick counts reviewed.  - Reviewed plan for collection of GBS swab at 36 weeks.  - Problem list updated, results console reviewed and updated with pertinent prenatal labs.  - PMH, PSH, medications reviewed and updated as needed  - Return to office in 2 wk(s) for routine prenatal care    Orders:  •  POCT urine dip    Subjective:   Erika Coburn is a 39 y.o.  who presents for routine prenatal care at 34w3d.  Complaints today: None  LOF: No; VB: No; Contractions: No; FM: Present and normal    Objective:  /72 (BP Location: Left arm, Patient Position: Sitting, Cuff Size: Standard)   Ht 6' 1\" (1.854 m)   Wt 92.2 kg (203 lb 3.2 oz)   BMI 26.81 kg/m²     General: Well appearing, no distress  Respiratory: Unlabored breathing  Cardiovascular: Regular rate.  Abdomen: Soft, gravid, nontender  Extremities: Warm and well perfused.  Non tender.    Pregravid Weight/BMI: 80.3 kg (177 lb) (BMI 23.36)  Current Weight: 92.2 kg (203 lb 3.2 oz)   Total Weight Gain: 11.9 kg (26 lb 3.2 oz)     Pre- Vitals    Flowsheet Row Most Recent Value   Prenatal Assessment    Fetal Heart Rate 145   Fundal Height (cm) 34 cm   Movement Present   Prenatal Vitals  "   Blood Pressure 118/72   Weight - Scale 92.2 kg (203 lb 3.2 oz)   Urine Albumin/Glucose    Dilation/Effacement/Station    Vaginal Drainage    Draining Fluid No   Edema    LLE Edema None   RLE Edema None   Facial Edema None           Gregory Moore MD  4/16/2025 3:28 PM

## 2025-04-30 ENCOUNTER — ULTRASOUND (OUTPATIENT)
Dept: PERINATAL CARE | Facility: OTHER | Age: 40
End: 2025-04-30
Attending: OBSTETRICS & GYNECOLOGY
Payer: COMMERCIAL

## 2025-04-30 ENCOUNTER — ROUTINE PRENATAL (OUTPATIENT)
Dept: OBGYN CLINIC | Facility: CLINIC | Age: 40
End: 2025-04-30
Payer: COMMERCIAL

## 2025-04-30 VITALS
HEIGHT: 72 IN | SYSTOLIC BLOOD PRESSURE: 124 MMHG | BODY MASS INDEX: 27.82 KG/M2 | DIASTOLIC BLOOD PRESSURE: 76 MMHG | WEIGHT: 205.4 LBS

## 2025-04-30 VITALS
DIASTOLIC BLOOD PRESSURE: 68 MMHG | HEIGHT: 72 IN | HEART RATE: 80 BPM | BODY MASS INDEX: 28.17 KG/M2 | WEIGHT: 208 LBS | SYSTOLIC BLOOD PRESSURE: 124 MMHG

## 2025-04-30 DIAGNOSIS — Z3A.36 36 WEEKS GESTATION OF PREGNANCY: ICD-10-CM

## 2025-04-30 DIAGNOSIS — Z36.89 ENCOUNTER FOR ULTRASOUND TO ASSESS FETAL GROWTH: ICD-10-CM

## 2025-04-30 DIAGNOSIS — O34.211 MATERNAL CARE DUE TO LOW TRANSVERSE UTERINE SCAR FROM PREVIOUS CESAREAN DELIVERY: ICD-10-CM

## 2025-04-30 DIAGNOSIS — O09.813 PREGNANCY RESULTING FROM IN VITRO FERTILIZATION IN THIRD TRIMESTER: ICD-10-CM

## 2025-04-30 DIAGNOSIS — O09.523 MULTIGRAVIDA OF ADVANCED MATERNAL AGE IN THIRD TRIMESTER: ICD-10-CM

## 2025-04-30 DIAGNOSIS — O43.103 PLACENTAL ABNORMALITY IN THIRD TRIMESTER: Primary | ICD-10-CM

## 2025-04-30 DIAGNOSIS — Z36.85 ANTENATAL SCREENING FOR STREPTOCOCCUS B: ICD-10-CM

## 2025-04-30 DIAGNOSIS — Z87.59 HISTORY OF GESTATIONAL HYPERTENSION: ICD-10-CM

## 2025-04-30 DIAGNOSIS — Z67.91 RH NEGATIVE STATE IN ANTEPARTUM PERIOD: ICD-10-CM

## 2025-04-30 DIAGNOSIS — O26.899 RH NEGATIVE STATE IN ANTEPARTUM PERIOD: ICD-10-CM

## 2025-04-30 DIAGNOSIS — O09.813 PREGNANCY RESULTING FROM IN VITRO FERTILIZATION IN THIRD TRIMESTER: Primary | ICD-10-CM

## 2025-04-30 LAB
SL AMB  POCT GLUCOSE, UA: NORMAL
SL AMB POCT URINE PROTEIN: NORMAL

## 2025-04-30 PROCEDURE — 76820 UMBILICAL ARTERY ECHO: CPT | Performed by: OBSTETRICS & GYNECOLOGY

## 2025-04-30 PROCEDURE — 99213 OFFICE O/P EST LOW 20 MIN: CPT | Performed by: OBSTETRICS & GYNECOLOGY

## 2025-04-30 PROCEDURE — 81002 URINALYSIS NONAUTO W/O SCOPE: CPT | Performed by: STUDENT IN AN ORGANIZED HEALTH CARE EDUCATION/TRAINING PROGRAM

## 2025-04-30 PROCEDURE — 59025 FETAL NON-STRESS TEST: CPT | Performed by: OBSTETRICS & GYNECOLOGY

## 2025-04-30 PROCEDURE — 59025 FETAL NON-STRESS TEST: CPT | Performed by: PHYSICIAN ASSISTANT

## 2025-04-30 PROCEDURE — 76816 OB US FOLLOW-UP PER FETUS: CPT | Performed by: OBSTETRICS & GYNECOLOGY

## 2025-04-30 PROCEDURE — PNV: Performed by: STUDENT IN AN ORGANIZED HEALTH CARE EDUCATION/TRAINING PROGRAM

## 2025-04-30 NOTE — PROGRESS NOTES
"Routine Prenatal Visit  [facility] - [place]  [address], [place] [address]  Assessment & Plan  Pregnancy resulting from in vitro fertilization in third trimester  - Scheduled for growth US and initiation of weekly APFS with MFM today.        Maternal care due to low transverse uterine scar from previous  delivery  - Repeat  scheduled.        Multigravida of advanced maternal age in third trimester         Rh negative state in antepartum period         History of gestational hypertension         36 weeks gestation of pregnancy  - Labor/Bleeding/ROM precautions given. Kick counts reviewed  - GBS swab collected today.  - Delivery consent reviewed and signed today. Risks of delivery reviewed, including bleeding, infection, damage to surrounding organs/structures (specifically bowel, bladder, vessels, nerves, ureters), need for operative vaginal delivery or  delivery, hysterectomy, need for blood transfusion, need for further surgery.   - Problem list updated, results console reviewed and updated with pertinent prenatal labs.  - PMH, PSH, medications reviewed and updated as needed  - Return to office in 1wk(s) for routine prenatal care    Orders:  •  POCT urine dip     screening for streptococcus B    Orders:  •  Strep B DNA probe, amplification    Subjective:   Erika Coburn is a 39 y.o.  who presents for routine prenatal care at 36w3d.  Complaints today: None  LOF: No; VB: No; Contractions: No; FM: Present and normal    Objective:  /76 (BP Location: Left arm, Patient Position: Sitting, Cuff Size: Standard)   Ht 6' 1\" (1.854 m)   Wt 93.2 kg (205 lb 6.4 oz)   BMI 27.10 kg/m²     General: Well appearing, no distress  Respiratory: Unlabored breathing  Cardiovascular: Regular rate.  Abdomen: Soft, gravid, nontender  Extremities: Warm and well perfused.  Non tender.    Pregravid Weight/BMI: 80.3 kg (177 lb) (BMI 23.36)  Current Weight: 93.2 kg (205 " lb 6.4 oz)   Total Weight Gain: 12.9 kg (28 lb 6.4 oz)     Pre- Vitals    Flowsheet Row Most Recent Value   Prenatal Assessment    Fetal Heart Rate 150   Fundal Height (cm) 36 cm   Movement Present   Prenatal Vitals    Blood Pressure 124/76   Weight - Scale 93.2 kg (205 lb 6.4 oz)   Urine Albumin/Glucose    Dilation/Effacement/Station    Vaginal Drainage    Draining Fluid No   Edema    LLE Edema None   RLE Edema None   Facial Edema None           Gregory Moore MD  2025 11:07 AM

## 2025-04-30 NOTE — PROGRESS NOTES
Repeat Non-Stress Testing:    Patient verbalizes +FM. Pt denies ALL:               Leaking of fluid   Contractions   Vaginal bleeding   Decreased fetal movement    Patient is performing daily kick counts. Patient has no questions or concerns.   NST strip reviewed by SARAH Haider in-person.

## 2025-04-30 NOTE — ASSESSMENT & PLAN NOTE
- Labor/Bleeding/ROM precautions given. Kick counts reviewed  - GBS swab collected today.  - Delivery consent reviewed and signed today. Risks of delivery reviewed, including bleeding, infection, damage to surrounding organs/structures (specifically bowel, bladder, vessels, nerves, ureters), need for operative vaginal delivery or  delivery, hysterectomy, need for blood transfusion, need for further surgery.   - Problem list updated, results console reviewed and updated with pertinent prenatal labs.  - PMH, PSH, medications reviewed and updated as needed  - Return to office in 1wk(s) for routine prenatal care    Orders:  •  POCT urine dip

## 2025-04-30 NOTE — PROGRESS NOTES
Erika Coburn has no complaints today.  She reports regular fetal movements and does not report any problems.  She is here today at 36w3d for an ultrasound for fetal growth, NST and LISANDRO.    Problem list:  IVF pregnancy  Advanced maternal age  History of gestational hypertension  Prior low-transverse  and is planning on a repeat .    Ultrasound findings:  The ultrasound today shows normal interval fetal growth and fluid.  On the ultrasound machine the fetal abdomen measurement was in the 9th percentile but when converted to the reporting program it was over 10th percentile.  While on the ultrasound machine umbilical Dopplers were completed that showed some normal and some elevated giving us an average umbilical Doppler that is elevated.  NST is reactive.     Pregnancy ultrasound has limitations and is unable to detect all forms of fetal congenital abnormalities.  The inaccuracy in the EFW can be off by 1 lb either way in the third trimester.    Follow up recommended:   We discussed ability of umbilical Dopplers to look for placental insufficiency but sometimes we will have a false positive result develop secondary to compression of the cord by fetal position.  Her NST today was very reactive suggesting this may be a false positive result.   Recommend starting twice weekly nonstress test and adding an umbilical Doppler to her next fluid scan.  Today's NST was done on a Wednesday so if she wants to do further NSTs in our office it would have to be Friday and Monday or she can schedule with her OB office on labor and delivery for 1 NST over the weekend on Saturday or  so that her NSTs are spaced evenly 3 to 4 days apart.  In the end she scheduled her NSTs for Friday and Monday in this office and we will repeat an NST fluid scan and Dopplers on Wednesday.  Encouraged daily fetal kick counts.  We discussed that recurrent elevated umbilical Dopplers if found may be associated with an  increased risk for placental insufficiency, future IUGR, future development of preeclampsia and increased risk for stillbirth. If her umbilical Dopplers are found to be persistently abnormal then recommend delivery anytime after 37 weeks.    Pre visit time reviewing her records   5 minutes  Face to face time 7 minutes  Post visit time on documentation of note, updating her problem list, adding orders and prescriptions 10 minutes.  Procedures that were completed today were charged separately.   The level of decision making was low level complexity.    Otilia Osborne MD

## 2025-04-30 NOTE — LETTER
2025     Raf Tse MD  [address]  [address]  [place] [address]    Patient: Erika Coburn   YOB: 1985   Date of Visit: 2025       Dear Dr. Raf Tse MD:    Thank you for referring Erika Coburn to me for evaluation. Below are my notes for this consultation.    If you have questions, please do not hesitate to call me. I look forward to following your patient along with you.         Sincerely,        Otilia Osborne MD        CC: No Recipients    Otilia Osborne MD  2025  3:15 PM  Sign when Signing Visit  Erika Coburn has no complaints today.  She reports regular fetal movements and does not report any problems.  She is here today at 36w3d for an ultrasound for fetal growth, NST and LISANDRO.    Problem list:  IVF pregnancy  Advanced maternal age  History of gestational hypertension  Prior low-transverse  and is planning on a repeat .    Ultrasound findings:  The ultrasound today shows normal interval fetal growth and fluid.  On the ultrasound machine the fetal abdomen measurement was in the 9th percentile but when converted to the reporting program it was over 10th percentile.  While on the ultrasound machine umbilical Dopplers were completed that showed some normal and some elevated giving us an average umbilical Doppler that is elevated.  NST is reactive.     Pregnancy ultrasound has limitations and is unable to detect all forms of fetal congenital abnormalities.  The inaccuracy in the EFW can be off by 1 lb either way in the third trimester.    Follow up recommended:   We discussed ability of umbilical Dopplers to look for placental insufficiency but sometimes we will have a false positive result develop secondary to compression of the cord by fetal position.  Her NST today was very reactive suggesting this may be a false positive result.   Recommend starting twice weekly nonstress test and adding an umbilical Doppler  to her next fluid scan.  Today's NST was done on a Wednesday so if she wants to do further NSTs in our office it would have to be Friday and Monday or she can schedule with her OB office on labor and delivery for 1 NST over the weekend on Saturday or Sunday so that her NSTs are spaced evenly 3 to 4 days apart.  In the end she scheduled her NSTs for Friday and Monday in this office and we will repeat an NST fluid scan and Dopplers on Wednesday.  Encouraged daily fetal kick counts.  We discussed that recurrent elevated umbilical Dopplers if found may be associated with an increased risk for placental insufficiency, future IUGR, future development of preeclampsia and increased risk for stillbirth. If her umbilical Dopplers are found to be persistently abnormal then recommend delivery anytime after 37 weeks.    Pre visit time reviewing her records   5 minutes  Face to face time 7 minutes  Post visit time on documentation of note, updating her problem list, adding orders and prescriptions 10 minutes.  Procedures that were completed today were charged separately.   The level of decision making was low level complexity.    Otilia Osborne MD

## 2025-05-02 ENCOUNTER — ROUTINE PRENATAL (OUTPATIENT)
Dept: PERINATAL CARE | Facility: OTHER | Age: 40
End: 2025-05-02
Attending: STUDENT IN AN ORGANIZED HEALTH CARE EDUCATION/TRAINING PROGRAM
Payer: COMMERCIAL

## 2025-05-02 VITALS
SYSTOLIC BLOOD PRESSURE: 132 MMHG | HEIGHT: 72 IN | BODY MASS INDEX: 27.96 KG/M2 | HEART RATE: 69 BPM | DIASTOLIC BLOOD PRESSURE: 68 MMHG | WEIGHT: 206.4 LBS

## 2025-05-02 DIAGNOSIS — O43.103 PLACENTAL ABNORMALITY IN THIRD TRIMESTER: ICD-10-CM

## 2025-05-02 DIAGNOSIS — O09.813 PREGNANCY RESULTING FROM IN VITRO FERTILIZATION IN THIRD TRIMESTER: Primary | ICD-10-CM

## 2025-05-02 DIAGNOSIS — Z3A.36 36 WEEKS GESTATION OF PREGNANCY: ICD-10-CM

## 2025-05-02 LAB — GP B STREP DNA SPEC QL NAA+PROBE: NEGATIVE

## 2025-05-02 PROCEDURE — 59025 FETAL NON-STRESS TEST: CPT | Performed by: STUDENT IN AN ORGANIZED HEALTH CARE EDUCATION/TRAINING PROGRAM

## 2025-05-02 NOTE — PROGRESS NOTES
Repeat Non-Stress Testing:    Patient verbalizes +FM. Pt denies ALL:               Leaking of fluid   Contractions   Vaginal bleeding   Decreased fetal movement    Patient is performing daily kick counts. Patient has no questions or concerns.   NST strip reviewed by Dr. Espinoza in-person.

## 2025-05-05 ENCOUNTER — ROUTINE PRENATAL (OUTPATIENT)
Dept: PERINATAL CARE | Facility: OTHER | Age: 40
End: 2025-05-05
Attending: OBSTETRICS & GYNECOLOGY
Payer: COMMERCIAL

## 2025-05-05 ENCOUNTER — RESULTS FOLLOW-UP (OUTPATIENT)
Dept: OBGYN CLINIC | Facility: CLINIC | Age: 40
End: 2025-05-05

## 2025-05-05 VITALS
SYSTOLIC BLOOD PRESSURE: 112 MMHG | WEIGHT: 207 LBS | HEART RATE: 78 BPM | DIASTOLIC BLOOD PRESSURE: 64 MMHG | HEIGHT: 72 IN | BODY MASS INDEX: 28.04 KG/M2

## 2025-05-05 DIAGNOSIS — O09.813 PREGNANCY RESULTING FROM IN VITRO FERTILIZATION IN THIRD TRIMESTER: Primary | ICD-10-CM

## 2025-05-05 DIAGNOSIS — Z3A.37 37 WEEKS GESTATION OF PREGNANCY: ICD-10-CM

## 2025-05-05 PROCEDURE — 59025 FETAL NON-STRESS TEST: CPT | Performed by: OBSTETRICS & GYNECOLOGY

## 2025-05-05 NOTE — PROGRESS NOTES
Repeat Non-Stress Testing:    Patient verbalizes +FM. Pt denies ALL:               Leaking of fluid   Contractions   Vaginal bleeding   Decreased fetal movement    Patient is performing daily kick counts. Patient has no questions or concerns.   NST strip reviewed by Dr. Mays in-person.

## 2025-05-07 ENCOUNTER — ROUTINE PRENATAL (OUTPATIENT)
Dept: OBGYN CLINIC | Facility: CLINIC | Age: 40
End: 2025-05-07
Payer: COMMERCIAL

## 2025-05-07 ENCOUNTER — ULTRASOUND (OUTPATIENT)
Dept: PERINATAL CARE | Facility: OTHER | Age: 40
End: 2025-05-07
Attending: OBSTETRICS & GYNECOLOGY
Payer: COMMERCIAL

## 2025-05-07 VITALS
WEIGHT: 205.8 LBS | DIASTOLIC BLOOD PRESSURE: 72 MMHG | BODY MASS INDEX: 27.87 KG/M2 | HEART RATE: 77 BPM | SYSTOLIC BLOOD PRESSURE: 128 MMHG | HEIGHT: 72 IN

## 2025-05-07 VITALS
BODY MASS INDEX: 27.77 KG/M2 | SYSTOLIC BLOOD PRESSURE: 128 MMHG | WEIGHT: 205 LBS | HEIGHT: 72 IN | DIASTOLIC BLOOD PRESSURE: 82 MMHG

## 2025-05-07 DIAGNOSIS — O34.211 MATERNAL CARE DUE TO LOW TRANSVERSE UTERINE SCAR FROM PREVIOUS CESAREAN DELIVERY: Primary | ICD-10-CM

## 2025-05-07 DIAGNOSIS — Z3A.37 37 WEEKS GESTATION OF PREGNANCY: Primary | ICD-10-CM

## 2025-05-07 DIAGNOSIS — O09.523 MULTIGRAVIDA OF ADVANCED MATERNAL AGE IN THIRD TRIMESTER: ICD-10-CM

## 2025-05-07 DIAGNOSIS — Z67.91 RH NEGATIVE STATE IN ANTEPARTUM PERIOD: ICD-10-CM

## 2025-05-07 DIAGNOSIS — O26.899 RH NEGATIVE STATE IN ANTEPARTUM PERIOD: ICD-10-CM

## 2025-05-07 DIAGNOSIS — O09.813 PREGNANCY RESULTING FROM IN VITRO FERTILIZATION IN THIRD TRIMESTER: ICD-10-CM

## 2025-05-07 DIAGNOSIS — Z3A.37 37 WEEKS GESTATION OF PREGNANCY: ICD-10-CM

## 2025-05-07 LAB
SL AMB  POCT GLUCOSE, UA: NORMAL
SL AMB POCT URINE PROTEIN: NORMAL

## 2025-05-07 PROCEDURE — 59025 FETAL NON-STRESS TEST: CPT | Performed by: OBSTETRICS & GYNECOLOGY

## 2025-05-07 PROCEDURE — PNV: Performed by: OBSTETRICS & GYNECOLOGY

## 2025-05-07 PROCEDURE — 76815 OB US LIMITED FETUS(S): CPT | Performed by: OBSTETRICS & GYNECOLOGY

## 2025-05-07 PROCEDURE — 81002 URINALYSIS NONAUTO W/O SCOPE: CPT | Performed by: OBSTETRICS & GYNECOLOGY

## 2025-05-07 NOTE — PROGRESS NOTES
"Routine Prenatal Visit  [facility] - [address], [address], [place] [address]    Assessment/Plan:  Erika is a 39 y.o. year old  at 37w3d who presents for routine prenatal visit.     1. Maternal care due to low transverse uterine scar from previous  delivery  2. Pregnancy resulting from in vitro fertilization in third trimester  3. Rh negative state in antepartum period  4. Multigravida of advanced maternal age in third trimester  5. 37 weeks gestation of pregnancy  -     POCT urine dip        Subjective:     CC: Prenatal care    Erika Coburn is a 39 y.o.  female who presents for routine prenatal care at 37w3d.  Pregnancy ROS: no leakage of fluid, pelvic pain, or vaginal bleeding.  normal fetal movement.    The following portions of the patient's history were reviewed and updated as appropriate: allergies, current medications, past family history, past medical history, obstetric history, gynecologic history, past social history, past surgical history and problem list.      Objective:  /82 (BP Location: Right arm, Patient Position: Sitting, Cuff Size: Standard)   Ht 6' 1\" (1.854 m)   Wt 93 kg (205 lb)   BMI 27.05 kg/m²   Pregravid Weight/BMI: 80.3 kg (177 lb) (BMI 23.36)  Current Weight: 93 kg (205 lb)   Total Weight Gain: 13.1 kg (28 lb 12.8 oz)   Pre-Katelynn Vitals      Flowsheet Row Most Recent Value   Prenatal Assessment    Prenatal Vitals    Blood Pressure 128/82   Weight - Scale 93 kg (205 lb)   Urine Albumin/Glucose    Dilation/Effacement/Station    Vaginal Drainage    Edema              General: Well appearing, no distress  Respiratory: Unlabored breathing  Cardiovascular: Regular rate.  Abdomen: Soft, gravid, nontender  Fundal Height: Appropriate for gestational age.  Extremities: Warm and well perfused.  Non tender.  "

## 2025-05-14 ENCOUNTER — ULTRASOUND (OUTPATIENT)
Dept: PERINATAL CARE | Facility: OTHER | Age: 40
End: 2025-05-14
Attending: OBSTETRICS & GYNECOLOGY
Payer: COMMERCIAL

## 2025-05-14 VITALS
DIASTOLIC BLOOD PRESSURE: 72 MMHG | HEART RATE: 86 BPM | SYSTOLIC BLOOD PRESSURE: 122 MMHG | HEIGHT: 72 IN | WEIGHT: 208 LBS | BODY MASS INDEX: 28.17 KG/M2

## 2025-05-14 DIAGNOSIS — O09.813 PREGNANCY RESULTING FROM IN VITRO FERTILIZATION IN THIRD TRIMESTER: ICD-10-CM

## 2025-05-14 DIAGNOSIS — Z3A.38 38 WEEKS GESTATION OF PREGNANCY: Primary | ICD-10-CM

## 2025-05-14 DIAGNOSIS — O43.103 PLACENTAL ABNORMALITY IN THIRD TRIMESTER: ICD-10-CM

## 2025-05-14 PROCEDURE — 59025 FETAL NON-STRESS TEST: CPT | Performed by: OBSTETRICS & GYNECOLOGY

## 2025-05-14 PROCEDURE — 76815 OB US LIMITED FETUS(S): CPT | Performed by: OBSTETRICS & GYNECOLOGY

## 2025-05-14 PROCEDURE — 76820 UMBILICAL ARTERY ECHO: CPT | Performed by: OBSTETRICS & GYNECOLOGY

## 2025-05-15 ENCOUNTER — TELEPHONE (OUTPATIENT)
Age: 40
End: 2025-05-15

## 2025-05-15 ENCOUNTER — ROUTINE PRENATAL (OUTPATIENT)
Dept: OBGYN CLINIC | Facility: CLINIC | Age: 40
End: 2025-05-15
Payer: COMMERCIAL

## 2025-05-15 VITALS
DIASTOLIC BLOOD PRESSURE: 82 MMHG | WEIGHT: 206 LBS | SYSTOLIC BLOOD PRESSURE: 130 MMHG | BODY MASS INDEX: 27.9 KG/M2 | HEIGHT: 72 IN

## 2025-05-15 DIAGNOSIS — Z67.91 RH NEGATIVE STATE IN ANTEPARTUM PERIOD: ICD-10-CM

## 2025-05-15 DIAGNOSIS — O09.523 MULTIGRAVIDA OF ADVANCED MATERNAL AGE IN THIRD TRIMESTER: ICD-10-CM

## 2025-05-15 DIAGNOSIS — O09.813 PREGNANCY RESULTING FROM IN VITRO FERTILIZATION IN THIRD TRIMESTER: Primary | ICD-10-CM

## 2025-05-15 DIAGNOSIS — O26.899 RH NEGATIVE STATE IN ANTEPARTUM PERIOD: ICD-10-CM

## 2025-05-15 DIAGNOSIS — O34.211 MATERNAL CARE DUE TO LOW TRANSVERSE UTERINE SCAR FROM PREVIOUS CESAREAN DELIVERY: ICD-10-CM

## 2025-05-15 DIAGNOSIS — Z3A.38 38 WEEKS GESTATION OF PREGNANCY: ICD-10-CM

## 2025-05-15 PROBLEM — O09.293: Status: ACTIVE | Noted: 2024-11-11

## 2025-05-15 LAB
SL AMB  POCT GLUCOSE, UA: NORMAL
SL AMB POCT URINE PROTEIN: NORMAL

## 2025-05-15 PROCEDURE — 81002 URINALYSIS NONAUTO W/O SCOPE: CPT | Performed by: STUDENT IN AN ORGANIZED HEALTH CARE EDUCATION/TRAINING PROGRAM

## 2025-05-15 PROCEDURE — PNV: Performed by: STUDENT IN AN ORGANIZED HEALTH CARE EDUCATION/TRAINING PROGRAM

## 2025-05-15 NOTE — TELEPHONE ENCOUNTER
Patient had [facility] paperwork faxed directly to the [facility] office at [phone]. Patient had it faxed on 5/2/25 and 5/15/25 for appointments that she had on 5/2/25 and 5/5/25. Patient is asking that this be filled out as soon as possible. Patient made aware that this location is mostly in use on Mondays, Wednesday and Friday. Patient will follow up tomorrow on this and can be reached at [phone].

## 2025-05-15 NOTE — ASSESSMENT & PLAN NOTE
- Labor/Bleeding/ROM precautions given. Kick counts reviewed.  - Problem list updated, results console reviewed and updated with pertinent prenatal labs.  - PMH, PSH, medications reviewed and updated as needed  - Return to office for postpartum care  Orders:  •  POCT urine dip

## 2025-05-15 NOTE — PROGRESS NOTES
"Routine Prenatal Visit  [facility] - [place] [address], [address], [place] [address]  Assessment & Plan  Pregnancy resulting from in vitro fertilization in third trimester  - Has been compliant with weekly APFS.        Maternal care due to low transverse uterine scar from previous  delivery  - Repeat  section scheduled Monday.        Multigravida of advanced maternal age in third trimester         38 weeks gestation of pregnancy  - Labor/Bleeding/ROM precautions given. Kick counts reviewed.  - Problem list updated, results console reviewed and updated with pertinent prenatal labs.  - PMH, PSH, medications reviewed and updated as needed  - Return to office for postpartum care  Orders:  •  POCT urine dip    Subjective:   Erika Coburn is a 39 y.o.  who presents for routine prenatal care at 38w4d.  Complaints today: None  LOF: No; VB: No; Contractions: No; FM: Present and normal    Objective:  /82 (BP Location: Left arm, Patient Position: Sitting, Cuff Size: Standard)   Ht 6' 1\" (1.854 m)   Wt 93.4 kg (206 lb)   BMI 27.18 kg/m²     General: Well appearing, no distress  Respiratory: Unlabored breathing  Cardiovascular: Regular rate.  Abdomen: Soft, gravid, nontender  Extremities: Warm and well perfused.  Non tender.    Pregravid Weight/BMI: 80.3 kg (177 lb) (BMI 23.36)  Current Weight: 93.4 kg (206 lb)   Total Weight Gain: 13.2 kg (29 lb)     Pre-Katelynn Vitals    Flowsheet Row Most Recent Value   Prenatal Assessment    Fetal Heart Rate 145   Fundal Height (cm) 38 cm   Movement Present   Prenatal Vitals    Blood Pressure 130/82   Weight - Scale 93.4 kg (206 lb)   Urine Albumin/Glucose    Dilation/Effacement/Station    Vaginal Drainage    Draining Fluid No   Edema    LLE Edema None   RLE Edema None   Facial Edema None           Gregory Moore MD  5/15/2025 12:02 PM   "

## 2025-05-16 ENCOUNTER — TELEPHONE (OUTPATIENT)
Dept: PERINATAL CARE | Facility: OTHER | Age: 40
End: 2025-05-16

## 2025-05-16 NOTE — TELEPHONE ENCOUNTER
Lvm for patient in regards to [place] paperwork that she stated she faxed to the office, informed her that we did not receive any faxes and if she can call the office to discuss further.

## 2025-05-16 NOTE — TELEPHONE ENCOUNTER
Warm transfer from the pod,I told the patient that we did not receive any paperwork here @ [facility] to fill out, gave her the fax machine number that is in the front where I sit [phone].She stated that she would call the office and have them refax the forms over again.

## 2025-05-19 ENCOUNTER — ANESTHESIA (OUTPATIENT)
Dept: LABOR AND DELIVERY | Facility: HOSPITAL | Age: 40
End: 2025-05-19
Payer: COMMERCIAL

## 2025-05-19 ENCOUNTER — ANESTHESIA EVENT (OUTPATIENT)
Dept: LABOR AND DELIVERY | Facility: HOSPITAL | Age: 40
End: 2025-05-19
Payer: COMMERCIAL

## 2025-05-19 ENCOUNTER — HOSPITAL ENCOUNTER (INPATIENT)
Facility: HOSPITAL | Age: 40
LOS: 4 days | Discharge: HOME | End: 2025-05-23
Attending: OBSTETRICS & GYNECOLOGY | Admitting: OBSTETRICS & GYNECOLOGY
Payer: COMMERCIAL

## 2025-05-19 PROBLEM — Z3A.39 39 WEEKS GESTATION OF PREGNANCY: Status: ACTIVE | Noted: 2022-11-15

## 2025-05-19 LAB
ABO GROUP BLD: NORMAL
ALBUMIN SERPL BCG-MCNC: 3.4 G/DL (ref 3.5–5)
ALP SERPL-CCNC: 112 U/L (ref 34–104)
ALT SERPL W P-5'-P-CCNC: 13 U/L (ref 7–52)
ANION GAP SERPL CALCULATED.3IONS-SCNC: 8 MMOL/L (ref 4–13)
AST SERPL W P-5'-P-CCNC: 18 U/L (ref 13–39)
BASE EXCESS BLDCOA CALC-SCNC: -6.6 MMOL/L (ref 3–11)
BASE EXCESS BLDCOV CALC-SCNC: -3.3 MMOL/L (ref 1–9)
BILIRUB SERPL-MCNC: 0.44 MG/DL (ref 0.2–1)
BLD GP AB SCN SERPL QL: POSITIVE
BLOOD GROUP ANTIBODIES SERPL: NORMAL
BUN SERPL-MCNC: 12 MG/DL (ref 5–25)
CALCIUM ALBUM COR SERPL-MCNC: 8.9 MG/DL (ref 8.3–10.1)
CALCIUM SERPL-MCNC: 8.4 MG/DL (ref 8.4–10.2)
CHLORIDE SERPL-SCNC: 108 MMOL/L (ref 96–108)
CO2 SERPL-SCNC: 22 MMOL/L (ref 21–32)
CREAT SERPL-MCNC: 0.73 MG/DL (ref 0.6–1.3)
ERYTHROCYTE [DISTWIDTH] IN BLOOD BY AUTOMATED COUNT: 12.1 % (ref 11.6–15.1)
GFR SERPL CREATININE-BSD FRML MDRD: 104 ML/MIN/1.73SQ M
GLUCOSE P FAST SERPL-MCNC: 76 MG/DL (ref 65–99)
GLUCOSE SERPL-MCNC: 76 MG/DL (ref 65–140)
HCO3 BLDCOA-SCNC: 21.3 MMOL/L (ref 17.3–27.3)
HCO3 BLDCOV-SCNC: 22.2 MMOL/L (ref 12.2–28.6)
HCT VFR BLD AUTO: 36.6 % (ref 34.8–46.1)
HGB BLD-MCNC: 12.6 G/DL (ref 11.5–15.4)
MCH RBC QN AUTO: 33 PG (ref 26.8–34.3)
MCHC RBC AUTO-ENTMCNC: 34.4 G/DL (ref 31.4–37.4)
MCV RBC AUTO: 96 FL (ref 82–98)
O2 CT VFR BLDCOA CALC: 4 ML/DL
OXYHGB MFR BLDCOA: 18.4 %
OXYHGB MFR BLDCOV: 52.2 %
PCO2 BLDCOA: 51.5 MM[HG] (ref 30–60)
PCO2 BLDCOV: 41.5 MM HG (ref 27–43)
PH BLDCOA: 7.23 [PH] (ref 7.23–7.43)
PH BLDCOV: 7.35 [PH] (ref 7.19–7.49)
PLATELET # BLD AUTO: 197 THOUSANDS/UL (ref 149–390)
PMV BLD AUTO: 11.8 FL (ref 8.9–12.7)
PO2 BLDCOA: 13.6 MM HG (ref 5–25)
PO2 BLDCOV: 21.9 MM HG (ref 15–45)
POTASSIUM SERPL-SCNC: 3.7 MMOL/L (ref 3.5–5.3)
PROT SERPL-MCNC: 6.1 G/DL (ref 6.4–8.4)
RBC # BLD AUTO: 3.82 MILLION/UL (ref 3.81–5.12)
RH BLD: NEGATIVE
SAO2 % BLDCOV: 12 ML/DL
SODIUM SERPL-SCNC: 138 MMOL/L (ref 135–147)
SPECIMEN EXPIRATION DATE: NORMAL
WBC # BLD AUTO: 8.2 THOUSAND/UL (ref 4.31–10.16)

## 2025-05-19 PROCEDURE — 86870 RBC ANTIBODY IDENTIFICATION: CPT | Performed by: OBSTETRICS & GYNECOLOGY

## 2025-05-19 PROCEDURE — 82805 BLOOD GASES W/O2 SATURATION: CPT | Performed by: OBSTETRICS & GYNECOLOGY

## 2025-05-19 PROCEDURE — 94762 N-INVAS EAR/PLS OXIMTRY CONT: CPT

## 2025-05-19 PROCEDURE — 80053 COMPREHEN METABOLIC PANEL: CPT | Performed by: OBSTETRICS & GYNECOLOGY

## 2025-05-19 PROCEDURE — NC001 PR NO CHARGE: Performed by: OBSTETRICS & GYNECOLOGY

## 2025-05-19 PROCEDURE — 59510 CESAREAN DELIVERY: CPT | Performed by: OBSTETRICS & GYNECOLOGY

## 2025-05-19 PROCEDURE — 86900 BLOOD TYPING SEROLOGIC ABO: CPT | Performed by: OBSTETRICS & GYNECOLOGY

## 2025-05-19 PROCEDURE — 86850 RBC ANTIBODY SCREEN: CPT | Performed by: OBSTETRICS & GYNECOLOGY

## 2025-05-19 PROCEDURE — 86901 BLOOD TYPING SEROLOGIC RH(D): CPT | Performed by: OBSTETRICS & GYNECOLOGY

## 2025-05-19 PROCEDURE — 4A1HXCZ MONITORING OF PRODUCTS OF CONCEPTION, CARDIAC RATE, EXTERNAL APPROACH: ICD-10-PCS | Performed by: OBSTETRICS & GYNECOLOGY

## 2025-05-19 PROCEDURE — 85027 COMPLETE CBC AUTOMATED: CPT | Performed by: OBSTETRICS & GYNECOLOGY

## 2025-05-19 PROCEDURE — 86780 TREPONEMA PALLIDUM: CPT | Performed by: OBSTETRICS & GYNECOLOGY

## 2025-05-19 RX ORDER — ONDANSETRON 2 MG/ML
4 INJECTION INTRAMUSCULAR; INTRAVENOUS ONCE AS NEEDED
Status: DISCONTINUED | OUTPATIENT
Start: 2025-05-19 | End: 2025-05-19

## 2025-05-19 RX ORDER — FENTANYL CITRATE/PF 50 MCG/ML
50 SYRINGE (ML) INJECTION
Status: DISCONTINUED | OUTPATIENT
Start: 2025-05-19 | End: 2025-05-19

## 2025-05-19 RX ORDER — MORPHINE SULFATE 0.5 MG/ML
INJECTION, SOLUTION EPIDURAL; INTRATHECAL; INTRAVENOUS AS NEEDED
Status: DISCONTINUED | OUTPATIENT
Start: 2025-05-19 | End: 2025-05-19

## 2025-05-19 RX ORDER — METOCLOPRAMIDE HYDROCHLORIDE 5 MG/ML
10 INJECTION INTRAMUSCULAR; INTRAVENOUS ONCE AS NEEDED
Status: DISCONTINUED | OUTPATIENT
Start: 2025-05-19 | End: 2025-05-19

## 2025-05-19 RX ORDER — ACETAMINOPHEN 325 MG/1
650 TABLET ORAL EVERY 6 HOURS SCHEDULED
Status: DISPENSED | OUTPATIENT
Start: 2025-05-19 | End: 2025-05-22

## 2025-05-19 RX ORDER — SIMETHICONE 80 MG
80 TABLET,CHEWABLE ORAL EVERY 6 HOURS PRN
Status: DISCONTINUED | OUTPATIENT
Start: 2025-05-19 | End: 2025-05-23 | Stop reason: HOSPADM

## 2025-05-19 RX ORDER — CALCIUM CARBONATE 500 MG/1
1000 TABLET, CHEWABLE ORAL 3 TIMES DAILY PRN
Status: DISCONTINUED | OUTPATIENT
Start: 2025-05-19 | End: 2025-05-23 | Stop reason: HOSPADM

## 2025-05-19 RX ORDER — DIPHENHYDRAMINE HCL 25 MG
25 TABLET ORAL EVERY 6 HOURS PRN
Status: DISCONTINUED | OUTPATIENT
Start: 2025-05-19 | End: 2025-05-23 | Stop reason: HOSPADM

## 2025-05-19 RX ORDER — HYDROMORPHONE HCL/PF 1 MG/ML
0.5 SYRINGE (ML) INJECTION
Status: DISCONTINUED | OUTPATIENT
Start: 2025-05-19 | End: 2025-05-19

## 2025-05-19 RX ORDER — NALOXONE HYDROCHLORIDE 0.4 MG/ML
0.1 INJECTION, SOLUTION INTRAMUSCULAR; INTRAVENOUS; SUBCUTANEOUS
Status: ACTIVE | OUTPATIENT
Start: 2025-05-19 | End: 2025-05-20

## 2025-05-19 RX ORDER — SODIUM CHLORIDE, SODIUM LACTATE, POTASSIUM CHLORIDE, CALCIUM CHLORIDE 600; 310; 30; 20 MG/100ML; MG/100ML; MG/100ML; MG/100ML
INJECTION, SOLUTION INTRAVENOUS CONTINUOUS PRN
Status: DISCONTINUED | OUTPATIENT
Start: 2025-05-19 | End: 2025-05-19

## 2025-05-19 RX ORDER — DEXAMETHASONE SODIUM PHOSPHATE 10 MG/ML
INJECTION, SOLUTION INTRAMUSCULAR; INTRAVENOUS AS NEEDED
Status: DISCONTINUED | OUTPATIENT
Start: 2025-05-19 | End: 2025-05-19

## 2025-05-19 RX ORDER — CEFAZOLIN SODIUM 2 G/50ML
2000 SOLUTION INTRAVENOUS ONCE
Status: COMPLETED | OUTPATIENT
Start: 2025-05-19 | End: 2025-05-19

## 2025-05-19 RX ORDER — OXYTOCIN/0.9 % SODIUM CHLORIDE 30/500 ML
62.5 PLASTIC BAG, INJECTION (ML) INTRAVENOUS ONCE
Status: COMPLETED | OUTPATIENT
Start: 2025-05-19 | End: 2025-05-19

## 2025-05-19 RX ORDER — FENTANYL CITRATE 50 UG/ML
INJECTION, SOLUTION INTRAMUSCULAR; INTRAVENOUS AS NEEDED
Status: DISCONTINUED | OUTPATIENT
Start: 2025-05-19 | End: 2025-05-19

## 2025-05-19 RX ORDER — NALBUPHINE HYDROCHLORIDE 10 MG/ML
10 INJECTION INTRAMUSCULAR; INTRAVENOUS; SUBCUTANEOUS
Status: DISCONTINUED | OUTPATIENT
Start: 2025-05-19 | End: 2025-05-23 | Stop reason: HOSPADM

## 2025-05-19 RX ORDER — CALCIUM CARBONATE 500 MG/1
1000 TABLET, CHEWABLE ORAL DAILY PRN
Status: DISCONTINUED | OUTPATIENT
Start: 2025-05-19 | End: 2025-05-19

## 2025-05-19 RX ORDER — OXYCODONE HYDROCHLORIDE 5 MG/1
10 TABLET ORAL EVERY 4 HOURS PRN
Refills: 0 | Status: DISCONTINUED | OUTPATIENT
Start: 2025-05-19 | End: 2025-05-23 | Stop reason: HOSPADM

## 2025-05-19 RX ORDER — ONDANSETRON 2 MG/ML
4 INJECTION INTRAMUSCULAR; INTRAVENOUS EVERY 8 HOURS PRN
Status: DISCONTINUED | OUTPATIENT
Start: 2025-05-19 | End: 2025-05-19

## 2025-05-19 RX ORDER — OXYCODONE HYDROCHLORIDE 5 MG/1
5 TABLET ORAL EVERY 4 HOURS PRN
Refills: 0 | Status: DISCONTINUED | OUTPATIENT
Start: 2025-05-19 | End: 2025-05-23 | Stop reason: HOSPADM

## 2025-05-19 RX ORDER — OXYTOCIN/RINGER'S LACTATE 30/500 ML
PLASTIC BAG, INJECTION (ML) INTRAVENOUS CONTINUOUS PRN
Status: DISCONTINUED | OUTPATIENT
Start: 2025-05-19 | End: 2025-05-19

## 2025-05-19 RX ORDER — ONDANSETRON 2 MG/ML
INJECTION INTRAMUSCULAR; INTRAVENOUS AS NEEDED
Status: DISCONTINUED | OUTPATIENT
Start: 2025-05-19 | End: 2025-05-19

## 2025-05-19 RX ORDER — SODIUM CHLORIDE, SODIUM LACTATE, POTASSIUM CHLORIDE, CALCIUM CHLORIDE 600; 310; 30; 20 MG/100ML; MG/100ML; MG/100ML; MG/100ML
125 INJECTION, SOLUTION INTRAVENOUS CONTINUOUS
Status: DISCONTINUED | OUTPATIENT
Start: 2025-05-19 | End: 2025-05-23 | Stop reason: HOSPADM

## 2025-05-19 RX ORDER — KETOROLAC TROMETHAMINE 30 MG/ML
30 INJECTION, SOLUTION INTRAMUSCULAR; INTRAVENOUS EVERY 6 HOURS PRN
Status: DISCONTINUED | OUTPATIENT
Start: 2025-05-19 | End: 2025-05-21 | Stop reason: SDUPTHER

## 2025-05-19 RX ORDER — CITRIC ACID/SODIUM CITRATE 334-500MG
30 SOLUTION, ORAL ORAL ONCE
Status: COMPLETED | OUTPATIENT
Start: 2025-05-19 | End: 2025-05-19

## 2025-05-19 RX ADMIN — KETOROLAC TROMETHAMINE 30 MG: 30 INJECTION, SOLUTION INTRAMUSCULAR at 23:31

## 2025-05-19 RX ADMIN — SODIUM CHLORIDE, SODIUM LACTATE, POTASSIUM CHLORIDE, AND CALCIUM CHLORIDE 125 ML/HR: .6; .31; .03; .02 INJECTION, SOLUTION INTRAVENOUS at 20:15

## 2025-05-19 RX ADMIN — FENTANYL CITRATE 15 MCG: 50 INJECTION, SOLUTION INTRAMUSCULAR; INTRAVENOUS at 10:52

## 2025-05-19 RX ADMIN — ACETAMINOPHEN 650 MG: 325 TABLET, FILM COATED ORAL at 13:21

## 2025-05-19 RX ADMIN — DEXAMETHASONE SODIUM PHOSPHATE 10 MG: 10 INJECTION, SOLUTION INTRAMUSCULAR; INTRAVENOUS at 11:20

## 2025-05-19 RX ADMIN — KETOROLAC TROMETHAMINE 30 MG: 30 INJECTION, SOLUTION INTRAMUSCULAR at 17:50

## 2025-05-19 RX ADMIN — ONDANSETRON 4 MG: 2 INJECTION INTRAMUSCULAR; INTRAVENOUS at 12:46

## 2025-05-19 RX ADMIN — SODIUM CHLORIDE, SODIUM LACTATE, POTASSIUM CHLORIDE, AND CALCIUM CHLORIDE: .6; .31; .03; .02 INJECTION, SOLUTION INTRAVENOUS at 11:41

## 2025-05-19 RX ADMIN — MORPHINE SULFATE 0.1 MG: 0.5 INJECTION, SOLUTION EPIDURAL; INTRATHECAL; INTRAVENOUS at 10:52

## 2025-05-19 RX ADMIN — SODIUM CHLORIDE, SODIUM LACTATE, POTASSIUM CHLORIDE, AND CALCIUM CHLORIDE: .6; .31; .03; .02 INJECTION, SOLUTION INTRAVENOUS at 10:38

## 2025-05-19 RX ADMIN — PHENYLEPHRINE HYDROCHLORIDE 50 MCG/MIN: 10 INJECTION, SOLUTION INTRAVENOUS at 10:55

## 2025-05-19 RX ADMIN — MORPHINE SULFATE 4.9 MG: 0.5 INJECTION, SOLUTION EPIDURAL; INTRATHECAL; INTRAVENOUS at 11:39

## 2025-05-19 RX ADMIN — SODIUM CHLORIDE, SODIUM LACTATE, POTASSIUM CHLORIDE, AND CALCIUM CHLORIDE 1000 ML: .6; .31; .03; .02 INJECTION, SOLUTION INTRAVENOUS at 09:37

## 2025-05-19 RX ADMIN — Medication 250 MILLI-UNITS/MIN: at 11:15

## 2025-05-19 RX ADMIN — SIMETHICONE 80 MG: 80 TABLET, CHEWABLE ORAL at 22:41

## 2025-05-19 RX ADMIN — CEFAZOLIN SODIUM 2000 MG: 2 SOLUTION INTRAVENOUS at 10:54

## 2025-05-19 RX ADMIN — ONDANSETRON 4 MG: 2 INJECTION INTRAMUSCULAR; INTRAVENOUS at 11:20

## 2025-05-19 RX ADMIN — SODIUM CITRATE AND CITRIC ACID MONOHYDRATE 30 ML: 500; 334 SOLUTION ORAL at 10:40

## 2025-05-19 RX ADMIN — Medication 62.5 MILLI-UNITS/MIN: at 13:21

## 2025-05-19 RX ADMIN — ACETAMINOPHEN 650 MG: 325 TABLET, FILM COATED ORAL at 22:41

## 2025-05-19 NOTE — ANESTHESIA PROCEDURE NOTES
Spinal Block    Patient location during procedure: OR  Start time: 5/19/2025 10:52 AM  Reason for block: procedure for pain and at surgeon's request  Staffing  Performed by: Raji Santos CRNA  Authorized by: Raji Santos CRNA    Preanesthetic Checklist  Completed: patient identified, IV checked, site marked, risks and benefits discussed, surgical consent, monitors and equipment checked, pre-op evaluation and timeout performed  Spinal Block  Patient position: sitting  Prep: ChloraPrep and site prepped and draped  Patient monitoring: frequent blood pressure checks, continuous pulse ox and heart rate  Approach: midline  Location: L3-4  Needle  Needle type: Pencan   Needle gauge: 24 G  Needle length: 4 in  Assessment  Sensory level: T4  Injection Assessment:  negative aspiration for heme, no paresthesia on injection and positive aspiration for clear CSF.  Post-procedure:  site cleaned

## 2025-05-19 NOTE — PLAN OF CARE
Problem: PAIN - ADULT  Goal: Verbalizes/displays adequate comfort level or baseline comfort level  Description: Interventions:  - Encourage patient to monitor pain and request assistance  - Assess pain using appropriate pain scale  - Administer analgesics as ordered based on type and severity of pain and evaluate response  - Implement non-pharmacological measures as appropriate and evaluate response  - Consider cultural and social influences on pain and pain management  - Notify physician/advanced practitioner if interventions unsuccessful or patient reports new pain  - Educate patient/family on pain management process including their role and importance of  reporting pain   - Provide non-pharmacologic/complimentary pain relief interventions  Outcome: Progressing     Problem: INFECTION - ADULT  Goal: Absence or prevention of progression during hospitalization  Description: INTERVENTIONS:  - Assess and monitor for signs and symptoms of infection  - Monitor lab/diagnostic results  - Monitor all insertion sites, i.e. indwelling lines, tubes, and drains  - Monitor endotracheal if appropriate and nasal secretions for changes in amount and color  - [place] appropriate cooling/warming therapies per order  - Administer medications as ordered  - Instruct and encourage patient and family to use good hand hygiene technique  - Identify and instruct in appropriate isolation precautions for identified infection/condition  Outcome: Progressing  Goal: Absence of fever/infection during neutropenic period  Description: INTERVENTIONS:  - Monitor WBC  - Perform strict hand hygiene  - Limit to healthy visitors only  - No plants, dried, fresh or silk flowers with rivera in patient room  Outcome: Progressing     Problem: SAFETY ADULT  Goal: Patient will remain free of falls  Description: INTERVENTIONS:  - Educate patient/family on patient safety including physical limitations  - Instruct patient to call for assistance with activity   -  Consider consulting OT/PT to assist with strengthening/mobility based on AM PAC & JH-HLM score  - Consult OT/PT to assist with strengthening/mobility   - Keep Call bell within reach  - Keep bed low and locked with side rails adjusted as appropriate  - Keep care items and personal belongings within reach  - Initiate and maintain comfort rounds  - Make Fall Risk Sign visible to staff  - Apply yellow socks and bracelet for high fall risk patients  - Consider moving patient to room near nurses station  Outcome: Progressing  Goal: Maintain or return to baseline ADL function  Description: INTERVENTIONS:  -  Assess patient's ability to carry out ADLs; assess patient's baseline for ADL function and identify physical deficits which impact ability to perform ADLs (bathing, care of mouth/teeth, toileting, grooming, dressing, etc.)  - Assess/evaluate cause of self-care deficits   - Assess range of motion  - Assess patient's mobility; develop plan if impaired  - Assess patient's need for assistive devices and provide as appropriate  - Encourage maximum independence but intervene and supervise when necessary  - Involve family in performance of ADLs  - Assess for home care needs following discharge   - Consider OT consult to assist with ADL evaluation and planning for discharge  - Provide patient education as appropriate  - Monitor functional capacity and physical performance, use of AM PAC & JH-HLM   - Monitor gait, balance and fatigue with ambulation    Outcome: Progressing  Goal: Maintains/Returns to pre admission functional level  Description: INTERVENTIONS:  - Perform AM-PAC 6 Click Basic Mobility/ Daily Activity assessment daily.  - Set and communicate daily mobility goal to care team and patient/family/caregiver.   - Collaborate with rehabilitation services on mobility goals if consulted  - Out of bed for toileting  - Record patient progress and toleration of activity level   Outcome: Progressing     Problem: DISCHARGE  PLANNING  Goal: Discharge to home or other facility with appropriate resources  Description: INTERVENTIONS:  - Identify barriers to discharge w/patient and caregiver  - Arrange for needed discharge resources and transportation as appropriate  - Identify discharge learning needs (meds, wound care, etc.)  - Arrange for interpretive services to assist at discharge as needed  - Refer to Case Management Department for coordinating discharge planning if the patient needs post-hospital services based on physician/advanced practitioner order or complex needs related to functional status, cognitive ability, or social support system  Outcome: Progressing     Problem: Knowledge Deficit  Goal: Patient/family/caregiver demonstrates understanding of disease process, treatment plan, medications, and discharge instructions  Description: Complete learning assessment and assess knowledge base.  Interventions:  - Provide teaching at level of understanding  - Provide teaching via preferred learning methods  Outcome: Progressing     Problem: POSTPARTUM  Goal: Experiences normal postpartum course  Description: INTERVENTIONS:  - Monitor maternal vital signs  - Assess uterine involution and lochia  Outcome: Progressing  Goal: Appropriate maternal -  bonding  Description: INTERVENTIONS:  - Identify family support  - Assess for appropriate maternal/infant bonding   -Encourage maternal/infant bonding opportunities  - Referral to  or  as needed  Outcome: Progressing  Goal: Establishment of infant feeding pattern  Description: INTERVENTIONS:  - Assess breast/bottle feeding  - Refer to lactation as needed  Outcome: Progressing  Goal: Incision(s), wounds(s) or drain site(s) healing without S/S of infection  Description: INTERVENTIONS  - Assess and document dressing, incision, wound bed, drain sites and surrounding tissue  - Provide patient and family education    Outcome: Progressing

## 2025-05-19 NOTE — OP NOTE
Section Operative Report - OB/GYN   Erika Coburn 39 y.o. female MRN: [id number]  Unit/Bed#: LD PACU- Encounter: [id number]    Indications: previous uterine incision LTCS x 1    Pre-operative Diagnosis:   39w1d  Prior LTCS  Rh negative  IVF pregnancy      Post-operative Diagnosis: same, delivered    Surgeon:  Raf Tse MD    Assistant(s): PALMIRA Walker MD  No qualified surgical resident was available to assist in the case.  The assistance of an additional surgeon was critical for completion of the case. The assistant surgeon provided assistance with exposure, hemostasis, delivery of the infant, tissue manipulation, and suturing. The assistant surgeon was present from the start of the procedure until fascial closure. I, the primary surgeon, was present and scrubbed throughout the entirety of the case and performed all key portions of the surgical procedure.      Findings:  1. Delivery of viable male on 25 at 1113, weight 6lbs 1oz;  Apgar scores of 8 at one minute and 9 at five minutes.   2. Normal appearing placenta with centrally-inserted 3 vessel cord  3. clear amniotic fluid  4. Grossly normal uterus, tubes, and ovaries    Specimens:   1. Arterial and venous cord gases  2. Cord blood  3. Placenta to storage     Estimated Blood Loss:  428 ml    Drains: De Anda catheter, draining clear yellow urine           Complications:  None; patient tolerated the procedure well.           Disposition: PACU            Condition: stable    Procedure Details   Decision was made to proceed with  section due to scheduled repeat C/S at 39w1d. Patient was made aware of these findings and the proposed plan. Risks, benefits, possible complications, alternate treatment options, and expected outcomes were discussed with the patient.  The patient agreed with the proposed plan and gave informed consent.      The patient was taken to the operating room where she was properly identified to the OR staff  and attending physician.She received spinal anesthesia by Dr. Wu preoperatively.  Fetal heart tones were appreciated and found to be appropriate. A De Anda catheter was aseptically inserted and SCDs were placed.  An intravaginal prep was performed.  The abdomen was prepped with Chloraprep and following appropriate drying time, the patient was draped in the usual sterile manner for a Pfannenstiel incision.  The patient had received Ancef 2g IV pre-operatively for prophylaxis.  A Time Out was held and the above information confirmed.  The patient was identified as Erika Coburn and the procedure verified as  Delivery.    A Pfannenstiel incision was made.  After the rectus muscles were  in the midline the peritoneum was identified entered avoiding bowel and bladder. The peritoneal defect was extended with blunt and sharp dissection.      A low transverse uterine incision was made with the scalpel and extended laterally with blunt dissection. The amnion was ruptured.  The surgeons hand was inserted through the hysterotomy and the fetal head was palpated, elevated, and delivered through the uterine incision with the assistance of fundal pressure.  Baby had spontaneous cry with good color and tone.  After 30 seconds of delayed cord clamping, the umbilical cord was clamped and cut.  The infant was handed off to the  providers.  Arterial and venous cord gases and cord blood (and a segment of umbilical cord) were obtained for evaluation and promptly sent to the lab.  The placenta delivered spontaneously with uterine fundal massage and was noted to have a centrally inserted 3 vessel cord. This was also sent to the lab for placental storage.     The uterus was exteriorized and a lap sponge was used to clear the cavity of clots and products of conception. The uterine incision was closed in two layers, an interlocking and imbricating suture of 0 chromic. Good hemostasis was confirmed upon  uterine closure.  The posterior culdesac was cleared of clots and debris and the uterus was returned to the abdomen.  The paracolic gutters were inspected and cleared of all clots and debris with moist lap sponges.  There was a significant diastasis and the rectus muscles were reapproximated with 2-0 Plain Gut suture in an interrupted fashion. The fascia was closed with a running suture of 0 Vicryl.  Subcutaneous tissues were closed reapproximated with . The skin was closed with Insorb subcuticular absorbable staples and the incision dressed with steri-strips.  Sterile dressing was applied.     At the conclusion of the procedure, all needle, sponge, and instrument counts were noted to be correct x2.  The patient tolerated the procedure well and was transferred to her the recovery room in stable condition.     Raf Tse MD      Pantera Group Classification System:  No Multiple pregnancy, No Transverse or oblique lie, No Breech lie, Gestational age is > or =37 weeks, Multiparous, Previous uterine scar +  is PANTERA GROUP 5        SIGNATURE: Raf Tse MD  DATE: May 19, 2025  TIME: 12:18 PM

## 2025-05-19 NOTE — H&P
H&P - OB/GYN   Name: Erika Coburn 39 y.o. female I MRN: [id number]  Unit/Bed#: LD PACU-02 I Date of Admission: 2025   Date of Service: 2025 I Hospital Day: 0     Assessment & Plan  Maternal care due to low transverse uterine scar from previous  delivery  Here for scheduled repeat LTCS  Pregnancy resulting from in vitro fertilization    39 weeks gestation of pregnancy    Rh negative state in antepartum period  Will evaluate PP for Rhig  Maternal age > 35, multigravida      History of Present Illness   Patient of: [facility] [place]  Brief Summary: Erika Coburn  with an TIFFANY of 2025, by Est. Date of Conception at 39w1d presenting for a scheduled repeat LTCS.    Chief Complaint: I'm here for a C/S     HPI:  Contractions: None.   Leakage of fluid: None.   Bleeding: None.   Fetal movement: present.    Pregnancy complications: none.     Review of Systems    Historical Information   Social History     Tobacco Use    Smoking status: Former     Current packs/day: 0.00     Types: Cigarettes     Quit date: 2014     Years since quitting: 10.9     Passive exposure: Never    Smokeless tobacco: Never   Vaping Use    Vaping status: Never Used   Substance and Sexual Activity    Alcohol use: Not Currently     Alcohol/week: 2.0 standard drinks of alcohol    Drug use: Never    Sexual activity: Yes     Partners: Male     Birth control/protection: None     Patient has no known allergies.  OB History    Para Term  AB Living   2 1 1 0 0 1   SAB IAB Ectopic Multiple Live Births   0 0 0 0 1      # Outcome Date GA Lbr Beny/2nd Weight Sex Type Anes PTL Lv   2 Current            1 Term 23 37w0d  2700 g (5 lb 15.2 oz) M CS-LTranv Spinal  TAMIKO      Birth Comments: Neonatologist present in OR at birth     Baby complications/comments: none    Objective :  Temp:  [98 °F (36.7 °C)] 98 °F (36.7 °C)  HR:  [69] 69  BP: (138)/(78) 138/78  Resp:  [20] 20  SpO2:  [98 %]  98 %    Physical Exam  Vitals and nursing note reviewed.   Constitutional:       General: She is not in acute distress.     Appearance: She is well-developed.   HENT:      Head: Normocephalic and atraumatic.     Eyes:      Conjunctiva/sclera: Conjunctivae normal.       Cardiovascular:      Rate and Rhythm: Normal rate and regular rhythm.      Heart sounds: No murmur heard.  Pulmonary:      Effort: Pulmonary effort is normal. No respiratory distress.   Abdominal:      Palpations: Abdomen is soft.      Tenderness: There is no abdominal tenderness.      Comments: EFW 3200 gm     Musculoskeletal:         General: No swelling.      Cervical back: Neck supple.     Skin:     General: Skin is warm and dry.      Capillary Refill: Capillary refill takes less than 2 seconds.     Neurological:      Mental Status: She is alert.     Psychiatric:         Mood and Affect: Mood normal.          Cervical Exam     Contractions:  Contraction Frequency (minutes): 14 mins  Contraction Duration (seconds): 70-80 secs  Contraction Intensity: Mild  Fetal heart rate  Baseline Rate (FHR): 135 bpm  Variability: Moderate  Accelerations: 15 x 15 or greater  Decelerations: Variable  FHR Category: Category I    Lab Results: I have reviewed the following results:  Strep Grp B MAO   Date Value Ref Range Status   2025 Negative Negative Final     Comment:     Centers for Disease Control and Prevention (CDC) and American Congress  of Obstetricians and Gynecologists (ACOG) guidelines for prevention of   group B streptococcal (GBS) disease specify co-collection of  a vaginal and rectal swab specimen to maximize sensitivity of GBS  detection. Per the CDC and ACOG, swabbing both the lower vagina and  rectum substantially increases the yield of detection compared with  sampling the vagina alone.  Penicillin G, ampicillin, or cefazolin are indicated for intrapartum  prophylaxis of  GBS colonization. Reflex susceptibility  testing  "should be performed prior to use of clindamycin only on GBS  isolates from penicillin-allergic women who are considered a high risk  for anaphylaxis. Treatment with vancomycin without additional testing  is warranted if resistance to clindamycin is noted.       No results found for: \"STREPGPB\"  RPR   Date Value Ref Range Status   02/26/2025 Non Reactive Non Reactive Final     Hepatitis B Surface Ag   Date Value Ref Range Status   11/13/2024 negative  Final     No components found for: \"EXTHEPBSAG\"  HEP C AB   Date Value Ref Range Status   11/13/2024 Non Reactive Non Reactive Final     No results found for: \"RUBELLAIGGQT\"  External Rubella IGG Quantitation   Date Value Ref Range Status   11/13/2024 immune  Final     HIV-1/HIV-2 AB   Date Value Ref Range Status   09/27/2022 Non-Reactive  Final     No components found for: \"RQFPIG7XC\"  No results found for: \"LABNGO\", \"LABCHLA\"    Type & Screen:  ABO Grouping   Date Value Ref Range Status   02/26/2025 O  Final   03/14/2023 O  Final     Rh Type   Date Value Ref Range Status   02/26/2025 Negative  Final     Comment:     Please note: Prior records for this patient's ABO / Rh type are not  available for additional verification.       No results found for: \"ANTIBODYSCR\"  Specimen Expiration Date   Date Value Ref Range Status   03/14/2023 [id number]  Final         "

## 2025-05-19 NOTE — ANESTHESIA POSTPROCEDURE EVALUATION
Post-Op Assessment Note    CV Status:  Stable  Pain Score: 0    Pain management: adequate       Mental Status:  Alert and awake   Hydration Status:  Euvolemic   PONV Controlled:  Controlled   Airway Patency:  Patent     Post Op Vitals Reviewed: Yes    No anethesia notable event occurred.    Staff: CRNA           Last Filed PACU Vitals:  Vitals Value Taken Time   Temp 97.1    Pulse 76    /73    Resp 18    SpO2 99

## 2025-05-19 NOTE — ANESTHESIA POSTPROCEDURE EVALUATION
Post-Op Assessment Note    CV Status:  Stable  Pain Score: 0    Pain management: adequate       Mental Status:  Alert and awake   Hydration Status:  Euvolemic   PONV Controlled:  Controlled   Airway Patency:  Patent     Post Op Vitals Reviewed: Yes    No anethesia notable event occurred.    Staff: Anesthesiologist, with CRNAs           Last Filed PACU Vitals:  Vitals Value Taken Time   Temp 97.7 °F (36.5 °C) 05/19/25 12:55   Pulse 65 05/19/25 13:25   /83 05/19/25 13:25   Resp 18 05/19/25 13:25   SpO2 98 % 05/19/25 13:25       Modified Ashli:     Vitals Value Taken Time   Activity 1 05/19/25 13:25   Respiration 2 05/19/25 13:25   Circulation 2 05/19/25 13:25   Consciousness 2 05/19/25 13:25   Oxygen Saturation 2 05/19/25 13:25     Modified Ashli Score: 9

## 2025-05-19 NOTE — ANESTHESIA PREPROCEDURE EVALUATION
Procedure:   SECTION () (Uterus)    Relevant Problems   ANESTHESIA (within normal limits)      CARDIO (within normal limits)      ENDO (within normal limits)      GI/HEPATIC (within normal limits)      /RENAL (within normal limits)      GYN   (+) 39 weeks gestation of pregnancy   (+) Maternal age > 35, multigravida   (+) Pregnancy resulting from in vitro fertilization   (+) Vanishing twin syndrome      HEMATOLOGY (within normal limits)      MUSCULOSKELETAL (within normal limits)      NEURO/PSYCH (within normal limits)      PULMONARY (within normal limits)      Care Coordination   (+) Maternal care due to low transverse uterine scar from previous  delivery        Physical Exam    Airway     Mallampati score: II  TM Distance: >3 FB  Neck ROM: full      Cardiovascular  Rhythm: regular, Rate: normal, Pulse is palpable. Cardiovascular exam normal    Dental   No notable dental hx     Pulmonary  Pulmonary exam normal Breath sounds clear to auscultation    Neurological    She appears awake, alert and oriented x3.      Other Findings  post-pubertal.      Anesthesia Plan  ASA Score- 2     Anesthesia Type- spinal with ASA Monitors.         Additional Monitors:     Airway Plan: natural airway.           Plan Factors-Exercise tolerance (METS): >4 METS.    Chart reviewed. EKG reviewed. Imaging results reviewed. Existing labs reviewed. Patient summary reviewed.                  Induction-     Postoperative Plan- Plan for postoperative opioid use.   Monitoring Plan - Monitoring plan - standard ASA monitoring  Post Operative Pain Plan - non-opiod analgesics, plan for postoperative opioid use and multimodal analgesia    Perioperative Resuscitation Plan - Level 1 - Full Code.       Informed Consent- Anesthetic plan and risks discussed with patient.  I personally reviewed this patient with the CRNA. Discussed and agreed on the Anesthesia Plan with the CRNA..      NPO Status:  Vitals Value Taken Time   Date of  "last liquid 05/18/25 05/19/25 08:39   Time of last liquid 2350 05/19/25 08:39   Date of last solid 05/18/25 05/19/25 08:39   Time of last solid 2350 05/19/25 08:39       Recent labs personally reviewed:  Lab Results   Component Value Date    WBC 11.1 (H) 02/26/2025    HGB 12.2 02/26/2025     02/26/2025     Lab Results   Component Value Date    K 4.4 12/23/2024    BUN 9 12/23/2024    CREATININE 0.62 12/23/2024     No results found for: \"PTT\"   No results found for: \"INR\"    Blood type O    Lab Results   Component Value Date    HGBA1C 4.9 10/11/2022       I, Spencer Wu MD, have personally seen and evaluated the patient prior to anesthetic care.  I have reviewed the pre-anesthetic record, and other medical records if appropriate to the anesthetic care.  If a CRNA is involved in the case, I have reviewed the CRNA assessment, if present, and agree. Risks/benefits and alternatives discussed with patient including possible PONV, sore throat, and possibility of rare anesthetic and surgical emergencies.      "

## 2025-05-20 ENCOUNTER — APPOINTMENT (INPATIENT)
Dept: CT IMAGING | Facility: HOSPITAL | Age: 40
End: 2025-05-20
Attending: OBSTETRICS & GYNECOLOGY
Payer: COMMERCIAL

## 2025-05-20 PROBLEM — D62 POSTOPERATIVE ANEMIA DUE TO ACUTE BLOOD LOSS: Status: ACTIVE | Noted: 2025-05-20

## 2025-05-20 LAB
ABO GROUP BLD: NORMAL
BASOPHILS # BLD AUTO: 0.02 THOUSANDS/ÂΜL (ref 0–0.1)
BASOPHILS # BLD AUTO: 0.03 THOUSANDS/ÂΜL (ref 0–0.1)
BASOPHILS # BLD AUTO: 0.03 THOUSANDS/ÂΜL (ref 0–0.1)
BASOPHILS NFR BLD AUTO: 0 % (ref 0–1)
BLD GP AB SCN SERPL QL: POSITIVE
EOSINOPHIL # BLD AUTO: 0 THOUSAND/ÂΜL (ref 0–0.61)
EOSINOPHIL # BLD AUTO: 0.01 THOUSAND/ÂΜL (ref 0–0.61)
EOSINOPHIL # BLD AUTO: 0.07 THOUSAND/ÂΜL (ref 0–0.61)
EOSINOPHIL NFR BLD AUTO: 0 % (ref 0–6)
EOSINOPHIL NFR BLD AUTO: 0 % (ref 0–6)
EOSINOPHIL NFR BLD AUTO: 1 % (ref 0–6)
ERYTHROCYTE [DISTWIDTH] IN BLOOD BY AUTOMATED COUNT: 12.2 % (ref 11.6–15.1)
ERYTHROCYTE [DISTWIDTH] IN BLOOD BY AUTOMATED COUNT: 12.7 % (ref 11.6–15.1)
ERYTHROCYTE [DISTWIDTH] IN BLOOD BY AUTOMATED COUNT: 15.9 % (ref 11.6–15.1)
FETAL CELL SCN BLD QL ROSETTE: NEGATIVE
HCT VFR BLD AUTO: 20.2 % (ref 34.8–46.1)
HCT VFR BLD AUTO: 22.2 % (ref 34.8–46.1)
HCT VFR BLD AUTO: 24 % (ref 34.8–46.1)
HGB BLD-MCNC: 6.7 G/DL (ref 11.5–15.4)
HGB BLD-MCNC: 7.4 G/DL (ref 11.5–15.4)
HGB BLD-MCNC: 8 G/DL (ref 11.5–15.4)
IMM GRANULOCYTES # BLD AUTO: 0.05 THOUSAND/UL (ref 0–0.2)
IMM GRANULOCYTES # BLD AUTO: 0.1 THOUSAND/UL (ref 0–0.2)
IMM GRANULOCYTES # BLD AUTO: 0.16 THOUSAND/UL (ref 0–0.2)
IMM GRANULOCYTES NFR BLD AUTO: 1 % (ref 0–2)
LYMPHOCYTES # BLD AUTO: 2.01 THOUSANDS/ÂΜL (ref 0.6–4.47)
LYMPHOCYTES # BLD AUTO: 2.09 THOUSANDS/ÂΜL (ref 0.6–4.47)
LYMPHOCYTES # BLD AUTO: 3.33 THOUSANDS/ÂΜL (ref 0.6–4.47)
LYMPHOCYTES NFR BLD AUTO: 13 % (ref 14–44)
LYMPHOCYTES NFR BLD AUTO: 14 % (ref 14–44)
LYMPHOCYTES NFR BLD AUTO: 18 % (ref 14–44)
MCH RBC QN AUTO: 31.9 PG (ref 26.8–34.3)
MCH RBC QN AUTO: 33.2 PG (ref 26.8–34.3)
MCH RBC QN AUTO: 33.5 PG (ref 26.8–34.3)
MCHC RBC AUTO-ENTMCNC: 33.2 G/DL (ref 31.4–37.4)
MCHC RBC AUTO-ENTMCNC: 33.3 G/DL (ref 31.4–37.4)
MCHC RBC AUTO-ENTMCNC: 33.3 G/DL (ref 31.4–37.4)
MCV RBC AUTO: 100 FL (ref 82–98)
MCV RBC AUTO: 101 FL (ref 82–98)
MCV RBC AUTO: 96 FL (ref 82–98)
MONOCYTES # BLD AUTO: 0.97 THOUSAND/ÂΜL (ref 0.17–1.22)
MONOCYTES # BLD AUTO: 1.56 THOUSAND/ÂΜL (ref 0.17–1.22)
MONOCYTES # BLD AUTO: 2.05 THOUSAND/ÂΜL (ref 0.17–1.22)
MONOCYTES NFR BLD AUTO: 10 % (ref 4–12)
MONOCYTES NFR BLD AUTO: 9 % (ref 4–12)
MONOCYTES NFR BLD AUTO: 9 % (ref 4–12)
NEUTROPHILS # BLD AUTO: 12.62 THOUSANDS/ÂΜL (ref 1.85–7.62)
NEUTROPHILS # BLD AUTO: 18.5 THOUSANDS/ÂΜL (ref 1.85–7.62)
NEUTROPHILS # BLD AUTO: 7.86 THOUSANDS/ÂΜL (ref 1.85–7.62)
NEUTS SEG NFR BLD AUTO: 71 % (ref 43–75)
NEUTS SEG NFR BLD AUTO: 76 % (ref 43–75)
NEUTS SEG NFR BLD AUTO: 76 % (ref 43–75)
NRBC BLD AUTO-RTO: 0 /100 WBCS
PLATELET # BLD AUTO: 158 THOUSANDS/UL (ref 149–390)
PLATELET # BLD AUTO: 210 THOUSANDS/UL (ref 149–390)
PLATELET # BLD AUTO: 264 THOUSANDS/UL (ref 149–390)
PMV BLD AUTO: 11.6 FL (ref 8.9–12.7)
PMV BLD AUTO: 12.2 FL (ref 8.9–12.7)
PMV BLD AUTO: 12.5 FL (ref 8.9–12.7)
RBC # BLD AUTO: 2 MILLION/UL (ref 3.81–5.12)
RBC # BLD AUTO: 2.32 MILLION/UL (ref 3.81–5.12)
RBC # BLD AUTO: 2.41 MILLION/UL (ref 3.81–5.12)
RH BLD: NEGATIVE
TREPONEMA PALLIDUM IGG+IGM AB [PRESENCE] IN SERUM OR PLASMA BY IMMUNOASSAY: NORMAL
WBC # BLD AUTO: 10.99 THOUSAND/UL (ref 4.31–10.16)
WBC # BLD AUTO: 16.4 THOUSAND/UL (ref 4.31–10.16)
WBC # BLD AUTO: 24.07 THOUSAND/UL (ref 4.31–10.16)

## 2025-05-20 PROCEDURE — 99024 POSTOP FOLLOW-UP VISIT: CPT | Performed by: OBSTETRICS & GYNECOLOGY

## 2025-05-20 PROCEDURE — 85025 COMPLETE CBC W/AUTO DIFF WBC: CPT | Performed by: OBSTETRICS & GYNECOLOGY

## 2025-05-20 PROCEDURE — 30233N1 TRANSFUSION OF NONAUTOLOGOUS RED BLOOD CELLS INTO PERIPHERAL VEIN, PERCUTANEOUS APPROACH: ICD-10-PCS | Performed by: OBSTETRICS & GYNECOLOGY

## 2025-05-20 PROCEDURE — 86922 COMPATIBILITY TEST ANTIGLOB: CPT

## 2025-05-20 PROCEDURE — 85461 HEMOGLOBIN FETAL: CPT | Performed by: OBSTETRICS & GYNECOLOGY

## 2025-05-20 PROCEDURE — P9016 RBC LEUKOCYTES REDUCED: HCPCS

## 2025-05-20 PROCEDURE — 86900 BLOOD TYPING SEROLOGIC ABO: CPT | Performed by: OBSTETRICS & GYNECOLOGY

## 2025-05-20 PROCEDURE — 86901 BLOOD TYPING SEROLOGIC RH(D): CPT | Performed by: OBSTETRICS & GYNECOLOGY

## 2025-05-20 PROCEDURE — 86921 COMPATIBILITY TEST INCUBATE: CPT

## 2025-05-20 PROCEDURE — 74177 CT ABD & PELVIS W/CONTRAST: CPT

## 2025-05-20 PROCEDURE — 86850 RBC ANTIBODY SCREEN: CPT | Performed by: OBSTETRICS & GYNECOLOGY

## 2025-05-20 RX ADMIN — ACETAMINOPHEN 650 MG: 325 TABLET, FILM COATED ORAL at 05:48

## 2025-05-20 RX ADMIN — ACETAMINOPHEN 650 MG: 325 TABLET, FILM COATED ORAL at 23:32

## 2025-05-20 RX ADMIN — ACETAMINOPHEN 650 MG: 325 TABLET, FILM COATED ORAL at 17:51

## 2025-05-20 RX ADMIN — IOHEXOL 100 ML: 350 INJECTION, SOLUTION INTRAVENOUS at 07:37

## 2025-05-20 RX ADMIN — KETOROLAC TROMETHAMINE 30 MG: 30 INJECTION, SOLUTION INTRAMUSCULAR at 17:52

## 2025-05-20 RX ADMIN — KETOROLAC TROMETHAMINE 30 MG: 30 INJECTION, SOLUTION INTRAMUSCULAR at 05:48

## 2025-05-20 RX ADMIN — SIMETHICONE 80 MG: 80 TABLET, CHEWABLE ORAL at 17:57

## 2025-05-20 RX ADMIN — KETOROLAC TROMETHAMINE 30 MG: 30 INJECTION, SOLUTION INTRAMUSCULAR at 23:31

## 2025-05-20 RX ADMIN — SIMETHICONE 80 MG: 80 TABLET, CHEWABLE ORAL at 05:49

## 2025-05-20 RX ADMIN — SIMETHICONE 80 MG: 80 TABLET, CHEWABLE ORAL at 09:29

## 2025-05-20 RX ADMIN — IRON SUCROSE 300 MG: 20 INJECTION, SOLUTION INTRAVENOUS at 00:58

## 2025-05-20 RX ADMIN — KETOROLAC TROMETHAMINE 30 MG: 30 INJECTION, SOLUTION INTRAMUSCULAR at 12:11

## 2025-05-20 RX ADMIN — ACETAMINOPHEN 650 MG: 325 TABLET, FILM COATED ORAL at 12:11

## 2025-05-20 NOTE — PROGRESS NOTES
Progress Note - OB/GYN  Post-Partum Physician Note   Erika Coburn 39 y.o. female MRN: [id number]  Unit/Bed#:  203-01 Encounter: [id number]  Assessment:  39 y.o. year-old , post-op/postpartum day #1 status-post repeat LTCS with acute postoperative blood loss anemia.    Plan:  Assessment & Plan   delivery delivered  - routine postoperative care  - ambulation and SCDs for VTE ppx  - f/u CT scan as noted below  - RBC transfusion  - breastfeeding  Postoperative anemia due to acute blood loss  - uncomplicated repeat  delivery with  cc  - Hg 12.6 > 8.0 > 6.7  - recommend stat CT A/P to evaluate for source of bleeding  - recommend RBC transfusion given sequential drop following surgery and symptoms; risks, benefits discussed. She understand she can continue breastfeeding despite use of IV contrast according to ACOG and lactation resources  Rh negative state in antepartum period  Will evaluate PP for Rhig    ______________________________________________  Patient is postop and postpartum day #1 following repeat LTCS     Subjective:   Two episodes of pre-syncope overnight; reported lightheadedness when sitting on the toilet this morning. Overall feeling better now.Labs have returned overnight showing sequential drop in hemoglobin but physical exma unremarkable and minimal lochia noted.  Denies HA, visual changes, epigastric pain. Denies shortness of breath or chest pain. Has not urinated sine gray out after midnight.  Minimal lochia.      Objective:   Vitals:    25 1953 05/19/25 2001 05/19/25 2307 25 0300   BP: (!) 82/47 120/58 126/64 118/73   BP Location: Right arm  Right arm Right arm   Pulse: 63  89 86   Resp:   18 18   Temp:   97.9 °F (36.6 °C) 97.9 °F (36.6 °C)   TempSrc:   Temporal Oral   SpO2: 98%  99% 98%   Weight:       Height:           Intake/Output Summary (Last 24 hours) at 2025 0649  Last data filed at 2025 0000  Gross per 24 hour   Intake 1200  "ml   Output 1328 ml   Net -128 ml       Physical Exam:  General: AOx3, NAD  Lungs: CTAB  CV: RRR  Abdomen: soft, fundus firm below umbilicus, appropriately tender; incision  under bandage which is dry  Extremities: nontender without edema bilaterally      Labs/Tests:   Lab Results   Component Value Date/Time    HGB 6.7 (L) 05/20/2025 05:18 AM    HGB 8.0 (L) 05/20/2025 12:07 AM    HGB 12.6 05/19/2025 09:29 AM     05/20/2025 05:18 AM     05/20/2025 12:07 AM     05/19/2025 09:29 AM    WBC 16.40 (H) 05/20/2025 05:18 AM    WBC 24.07 (H) 05/20/2025 12:07 AM    WBC 8.20 05/19/2025 09:29 AM    CREATININE 0.73 05/19/2025 09:29 AM    ALT 13 05/19/2025 09:29 AM    AST 18 05/19/2025 09:29 AM        Brief OB Lab review:  ABO Grouping   Date Value Ref Range Status   05/19/2025 O  Final      Rh Factor   Date Value Ref Range Status   05/19/2025 Negative  Final     Rh Type   Date Value Ref Range Status   02/26/2025 Negative  Final     Comment:     Please note: Prior records for this patient's ABO / Rh type are not  available for additional verification.      No results found for: \"ANTIBODYSCR\"  No results found for: \"RUBM\"    MEDS:     Current Facility-Administered Medications:     acetaminophen (TYLENOL) tablet 650 mg, Q6H FARIHA    calcium carbonate (TUMS) chewable tablet 1,000 mg, TID PRN    diphenhydrAMINE (BENADRYL) tablet 25 mg, Q6H PRN    ketorolac (TORADOL) injection 30 mg, Q6H PRN    lactated ringers infusion, Continuous, Last Rate: Stopped (05/20/25 0000)    nalbuphine (NUBAIN) injection 10 mg, Q3H PRN    naloxone (NARCAN) injection 0.1 mg, Q3 min PRN    oxyCODONE (ROXICODONE) IR tablet 10 mg, Q4H PRN    oxyCODONE (ROXICODONE) IR tablet 5 mg, Q4H PRN    Rho(D) immune globulin (RHOGAM ULTRA-FILTERED PLUS) IM injection 300 mcg, Once **AND** Postpartum Rhogam, Once    simethicone (MYLICON) chewable tablet 80 mg, Q6H PRN  Invasive Devices       Peripheral Intravenous Line  Duration             Peripheral IV " 05/19/25 Left;Ventral (anterior) Forearm <1 day                      Abbi Crook MD  5/20/2025 6:49 AM

## 2025-05-20 NOTE — ASSESSMENT & PLAN NOTE
- routine postoperative care  - ambulation and SCDs for VTE ppx  - f/u CT scan as noted below  - RBC transfusion  - breastfeeding

## 2025-05-20 NOTE — PLAN OF CARE
Problem: PAIN - ADULT  Goal: Verbalizes/displays adequate comfort level or baseline comfort level  Description: Interventions:  - Encourage patient to monitor pain and request assistance  - Assess pain using appropriate pain scale  - Administer analgesics as ordered based on type and severity of pain and evaluate response  - Implement non-pharmacological measures as appropriate and evaluate response  - Consider cultural and social influences on pain and pain management  - Notify physician/advanced practitioner if interventions unsuccessful or patient reports new pain  - Educate patient/family on pain management process including their role and importance of  reporting pain   - Provide non-pharmacologic/complimentary pain relief interventions  Outcome: Progressing     Problem: INFECTION - ADULT  Goal: Absence or prevention of progression during hospitalization  Description: INTERVENTIONS:  - Assess and monitor for signs and symptoms of infection  - Monitor lab/diagnostic results  - Monitor all insertion sites, i.e. indwelling lines, tubes, and drains  - Monitor endotracheal if appropriate and nasal secretions for changes in amount and color  - [place] appropriate cooling/warming therapies per order  - Administer medications as ordered  - Instruct and encourage patient and family to use good hand hygiene technique  - Identify and instruct in appropriate isolation precautions for identified infection/condition  Outcome: Progressing  Goal: Absence of fever/infection during neutropenic period  Description: INTERVENTIONS:  - Monitor WBC  - Perform strict hand hygiene  - Limit to healthy visitors only  - No plants, dried, fresh or silk flowers with rivera in patient room  Outcome: Progressing     Problem: SAFETY ADULT  Goal: Patient will remain free of falls  Description: INTERVENTIONS:  - Educate patient/family on patient safety including physical limitations  - Instruct patient to call for assistance with activity   -  Consider consulting OT/PT to assist with strengthening/mobility based on AM PAC & JH-HLM score  - Consult OT/PT to assist with strengthening/mobility   - Keep Call bell within reach  - Keep bed low and locked with side rails adjusted as appropriate  - Keep care items and personal belongings within reach  - Initiate and maintain comfort rounds  - Make Fall Risk Sign visible to staff  - Apply yellow socks and bracelet for high fall risk patients  - Consider moving patient to room near nurses station  Outcome: Progressing  Goal: Maintain or return to baseline ADL function  Description: INTERVENTIONS:  -  Assess patient's ability to carry out ADLs; assess patient's baseline for ADL function and identify physical deficits which impact ability to perform ADLs (bathing, care of mouth/teeth, toileting, grooming, dressing, etc.)  - Assess/evaluate cause of self-care deficits   - Assess range of motion  - Assess patient's mobility; develop plan if impaired  - Assess patient's need for assistive devices and provide as appropriate  - Encourage maximum independence but intervene and supervise when necessary  - Involve family in performance of ADLs  - Assess for home care needs following discharge   - Consider OT consult to assist with ADL evaluation and planning for discharge  - Provide patient education as appropriate  - Monitor functional capacity and physical performance, use of AM PAC & JH-HLM   - Monitor gait, balance and fatigue with ambulation    Outcome: Progressing  Goal: Maintains/Returns to pre admission functional level  Description: INTERVENTIONS:  - Perform AM-PAC 6 Click Basic Mobility/ Daily Activity assessment daily.  - Set and communicate daily mobility goal to care team and patient/family/caregiver.   - Collaborate with rehabilitation services on mobility goals if consulted  - Out f bed for toileting  - Record patient progress and toleration of activity level   Outcome: Progressing     Problem: DISCHARGE  PLANNING  Goal: Discharge to home or other facility with appropriate resources  Description: INTERVENTIONS:  - Identify barriers to discharge w/patient and caregiver  - Arrange for needed discharge resources and transportation as appropriate  - Identify discharge learning needs (meds, wound care, etc.)  - Arrange for interpretive services to assist at discharge as needed  - Refer to Case Management Department for coordinating discharge planning if the patient needs post-hospital services based on physician/advanced practitioner order or complex needs related to functional status, cognitive ability, or social support system  Outcome: Progressing     Problem: Knowledge Deficit  Goal: Patient/family/caregiver demonstrates understanding of disease process, treatment plan, medications, and discharge instructions  Description: Complete learning assessment and assess knowledge base.  Interventions:  - Provide teaching at level of understanding  - Provide teaching via preferred learning methods  Outcome: Progressing     Problem: POSTPARTUM  Goal: Experiences normal postpartum course  Description: INTERVENTIONS:  - Monitor maternal vital signs  - Assess uterine involution and lochia  Outcome: Progressing  Goal: Appropriate maternal -  bonding  Description: INTERVENTIONS:  - Identify family support  - Assess for appropriate maternal/infant bonding   -Encourage maternal/infant bonding opportunities  - Referral to  or  as needed  Outcome: Progressing  Goal: Establishment of infant feeding pattern  Description: INTERVENTIONS:  - Assess breast/bottle feeding  - Refer to lactation as needed  Outcome: Progressing  Goal: Incision(s), wounds(s) or drain site(s) healing without S/S of infection  Description: INTERVENTIONS  - Assess and document dressing, incision, wound bed, drain sites and surrounding tissue  - Provide patient and family education  Outcome: Progressing

## 2025-05-20 NOTE — PROGRESS NOTES
"Notified by RN of patient feeling dizzy lying down - BP her BP was 82/47 , she was repositioned and BP now 120/58. Patient reports feelign better now Denies SOB ro chets pain but feels shaky.    BP (!) 82/47 (BP Location: Right arm)   Pulse 63   Temp 97.7 °F (36.5 °C) (Oral)   Resp 18   Ht 6' 1\" (1.854 m)   Wt 93.4 kg (206 lb)   SpO2 98%   Breastfeeding Unknown   BMI 27.18 kg/m²     General: AOx3, NAD  Lungs: CTAB  CV: RRR  Abdomen: soft, appropriately tender  Extremities: nontender without edema bilaterally    - likely vasovagal episode  - do not suspect significant bleeding as symptoms are resolving and BP is improved- continue to monitor  - check CBC in am  "

## 2025-05-20 NOTE — PLAN OF CARE
Problem: PAIN - ADULT  Goal: Verbalizes/displays adequate comfort level or baseline comfort level  Description: Interventions:  - Encourage patient to monitor pain and request assistance  - Assess pain using appropriate pain scale  - Administer analgesics as ordered based on type and severity of pain and evaluate response  - Implement non-pharmacological measures as appropriate and evaluate response  - Consider cultural and social influences on pain and pain management  - Notify physician/advanced practitioner if interventions unsuccessful or patient reports new pain  - Educate patient/family on pain management process including their role and importance of  reporting pain   - Provide non-pharmacologic/complimentary pain relief interventions  Outcome: Progressing     Problem: INFECTION - ADULT  Goal: Absence or prevention of progression during hospitalization  Description: INTERVENTIONS:  - Assess and monitor for signs and symptoms of infection  - Monitor lab/diagnostic results  - Monitor all insertion sites, i.e. indwelling lines, tubes, and drains  - Monitor endotracheal if appropriate and nasal secretions for changes in amount and color  - [place] appropriate cooling/warming therapies per order  - Administer medications as ordered  - Instruct and encourage patient and family to use good hand hygiene technique  - Identify and instruct in appropriate isolation precautions for identified infection/condition  Outcome: Progressing  Goal: Absence of fever/infection during neutropenic period  Description: INTERVENTIONS:  - Monitor WBC  - Perform strict hand hygiene  - Limit to healthy visitors only  - No plants, dried, fresh or silk flowers with rivera in patient room  Outcome: Progressing     Problem: SAFETY ADULT  Goal: Patient will remain free of falls  Description: INTERVENTIONS:  - Educate patient/family on patient safety including physical limitations  - Instruct patient to call for assistance with activity   -  Consider consulting OT/PT to assist with strengthening/mobility based on AM PAC & JH-HLM score  - Consult OT/PT to assist with strengthening/mobility   - Keep Call bell within reach  - Keep bed low and locked with side rails adjusted as appropriate  - Keep care items and personal belongings within reach  - Initiate and maintain comfort rounds  - Make Fall Risk Sign visible to staff    - Apply yellow socks and bracelet for high fall risk patients  - Consider moving patient to room near nurses station  Outcome: Progressing  Goal: Maintain or return to baseline ADL function  Description: INTERVENTIONS:  -  Assess patient's ability to carry out ADLs; assess patient's baseline for ADL function and identify physical deficits which impact ability to perform ADLs (bathing, care of mouth/teeth, toileting, grooming, dressing, etc.)  - Assess/evaluate cause of self-care deficits   - Assess range of motion  - Assess patient's mobility; develop plan if impaired  - Assess patient's need for assistive devices and provide as appropriate  - Encourage maximum independence but intervene and supervise when necessary  - Involve family in performance of ADLs  - Assess for home care needs following discharge   - Consider OT consult to assist with ADL evaluation and planning for discharge  - Provide patient education as appropriate  - Monitor functional capacity and physical performance, use of AM PAC & JH-HLM   - Monitor gait, balance and fatigue with ambulation    Outcome: Progressing  Goal: Maintains/Returns to pre admission functional level  Description: INTERVENTIONS:  - Perform AM-PAC 6 Click Basic Mobility/ Daily Activity assessment daily.  - Set and communicate daily mobility goal to care team and patient/family/caregiver.   - Collaborate with rehabilitation services on mobility goals if consulted    - Out of bed for toileting  - Record patient progress and toleration of activity level   Outcome: Progressing     Problem: DISCHARGE  PLANNING  Goal: Discharge to home or other facility with appropriate resources  Description: INTERVENTIONS:  - Identify barriers to discharge w/patient and caregiver  - Arrange for needed discharge resources and transportation as appropriate  - Identify discharge learning needs (meds, wound care, etc.)  - Arrange for interpretive services to assist at discharge as needed  - Refer to Case Management Department for coordinating discharge planning if the patient needs post-hospital services based on physician/advanced practitioner order or complex needs related to functional status, cognitive ability, or social support system  Outcome: Progressing     Problem: Knowledge Deficit  Goal: Patient/family/caregiver demonstrates understanding of disease process, treatment plan, medications, and discharge instructions  Description: Complete learning assessment and assess knowledge base.  Interventions:  - Provide teaching at level of understanding  - Provide teaching via preferred learning methods  Outcome: Progressing     Problem: POSTPARTUM  Goal: Experiences normal postpartum course  Description: INTERVENTIONS:  - Monitor maternal vital signs  - Assess uterine involution and lochia  Outcome: Progressing  Goal: Appropriate maternal -  bonding  Description: INTERVENTIONS:  - Identify family support  - Assess for appropriate maternal/infant bonding   -Encourage maternal/infant bonding opportunities  - Referral to  or  as needed  Outcome: Progressing  Goal: Establishment of infant feeding pattern  Description: INTERVENTIONS:  - Assess breast/bottle feeding  - Refer to lactation as needed  Outcome: Progressing  Goal: Incision(s), wounds(s) or drain site(s) healing without S/S of infection  Description: INTERVENTIONS  - Assess and document dressing, incision, wound bed, drain sites and surrounding tissue  - Provide patient and family education    Outcome: Progressing

## 2025-05-20 NOTE — ASSESSMENT & PLAN NOTE
- uncomplicated repeat  delivery with  cc  - Hg 12.6 > 8.0 > 6.7  - recommend stat CT A/P to evaluate for source of bleeding  - recommend RBC transfusion given sequential drop following surgery and symptoms; risks, benefits discussed. She understand she can continue breastfeeding despite use of IV contrast according to ACOG and lactation resources

## 2025-05-20 NOTE — QUICK NOTE
CT shows large hematoma in pelvic/abdomen    ABDOMINOPELVIC CAVITY: Small to moderate hemoperitoneum, with large component in the pelvis anterior to the uterus measuring approximately 8.6 x 13 x 16 cm in AP, transverse and longitudinal dimensions. No definite areas of contrast blush identified to   indicate active hemorrhage.    ABDOMINAL WALL/INGUINAL REGIONS: Postsurgical changes in the anterior abdominal wall, with extensive rectus sheath hematoma in the pelvic wall measuring approximately 3 x 14 x 13 cm. No contrast blush identified to indicate active hemorrhage.     IMPRESSION:     Large 16 cm hematoma in the pelvis, with no definite evidence of active hemorrhage.     Extensive rectus sheath hematoma in the pelvic wall, with no definite evidence of active hemorrhage.        Patient's vital signs all stable and she is asymptomatic except for a little weakness.   Exam is benign    As there is no evidence of active bleeding in either site, will continue with transfusion of 2 u pRBC and continue to watch clinically    All of this was discussed with the patient and  at length.

## 2025-05-21 PROBLEM — O13.9 GESTATIONAL HYPERTENSION, ANTEPARTUM: Status: ACTIVE | Noted: 2023-03-14

## 2025-05-21 LAB
ABO GROUP BLD BPU: NORMAL
ABO GROUP BLD BPU: NORMAL
ALBUMIN SERPL BCG-MCNC: 2.9 G/DL (ref 3.5–5)
ALP SERPL-CCNC: 75 U/L (ref 34–104)
ALT SERPL W P-5'-P-CCNC: 11 U/L (ref 7–52)
ANION GAP SERPL CALCULATED.3IONS-SCNC: 4 MMOL/L (ref 4–13)
AST SERPL W P-5'-P-CCNC: 16 U/L (ref 13–39)
BASOPHILS # BLD AUTO: 0.02 THOUSANDS/ÂΜL (ref 0–0.1)
BASOPHILS # BLD AUTO: 0.03 THOUSANDS/ÂΜL (ref 0–0.1)
BASOPHILS NFR BLD AUTO: 0 % (ref 0–1)
BASOPHILS NFR BLD AUTO: 0 % (ref 0–1)
BILIRUB SERPL-MCNC: 0.58 MG/DL (ref 0.2–1)
BPU ID: NORMAL
BPU ID: NORMAL
BUN SERPL-MCNC: 13 MG/DL (ref 5–25)
CALCIUM ALBUM COR SERPL-MCNC: 8.8 MG/DL (ref 8.3–10.1)
CALCIUM SERPL-MCNC: 7.9 MG/DL (ref 8.4–10.2)
CHLORIDE SERPL-SCNC: 110 MMOL/L (ref 96–108)
CO2 SERPL-SCNC: 23 MMOL/L (ref 21–32)
CREAT SERPL-MCNC: 0.77 MG/DL (ref 0.6–1.3)
CROSSMATCH: NORMAL
CROSSMATCH: NORMAL
EOSINOPHIL # BLD AUTO: 0.1 THOUSAND/ÂΜL (ref 0–0.61)
EOSINOPHIL # BLD AUTO: 0.11 THOUSAND/ÂΜL (ref 0–0.61)
EOSINOPHIL NFR BLD AUTO: 1 % (ref 0–6)
EOSINOPHIL NFR BLD AUTO: 1 % (ref 0–6)
ERYTHROCYTE [DISTWIDTH] IN BLOOD BY AUTOMATED COUNT: 15.9 % (ref 11.6–15.1)
ERYTHROCYTE [DISTWIDTH] IN BLOOD BY AUTOMATED COUNT: 16.1 % (ref 11.6–15.1)
GFR SERPL CREATININE-BSD FRML MDRD: 98 ML/MIN/1.73SQ M
GLUCOSE SERPL-MCNC: 87 MG/DL (ref 65–140)
HCT VFR BLD AUTO: 22.1 % (ref 34.8–46.1)
HCT VFR BLD AUTO: 23.6 % (ref 34.8–46.1)
HGB BLD-MCNC: 7.2 G/DL (ref 11.5–15.4)
HGB BLD-MCNC: 7.9 G/DL (ref 11.5–15.4)
IMM GRANULOCYTES # BLD AUTO: 0.07 THOUSAND/UL (ref 0–0.2)
IMM GRANULOCYTES # BLD AUTO: 0.11 THOUSAND/UL (ref 0–0.2)
IMM GRANULOCYTES NFR BLD AUTO: 1 % (ref 0–2)
IMM GRANULOCYTES NFR BLD AUTO: 1 % (ref 0–2)
LYMPHOCYTES # BLD AUTO: 1.73 THOUSANDS/ÂΜL (ref 0.6–4.47)
LYMPHOCYTES # BLD AUTO: 2.01 THOUSANDS/ÂΜL (ref 0.6–4.47)
LYMPHOCYTES NFR BLD AUTO: 16 % (ref 14–44)
LYMPHOCYTES NFR BLD AUTO: 18 % (ref 14–44)
MCH RBC QN AUTO: 31.4 PG (ref 26.8–34.3)
MCH RBC QN AUTO: 31.6 PG (ref 26.8–34.3)
MCHC RBC AUTO-ENTMCNC: 32.6 G/DL (ref 31.4–37.4)
MCHC RBC AUTO-ENTMCNC: 33.5 G/DL (ref 31.4–37.4)
MCV RBC AUTO: 94 FL (ref 82–98)
MCV RBC AUTO: 97 FL (ref 82–98)
MONOCYTES # BLD AUTO: 0.76 THOUSAND/ÂΜL (ref 0.17–1.22)
MONOCYTES # BLD AUTO: 0.76 THOUSAND/ÂΜL (ref 0.17–1.22)
MONOCYTES NFR BLD AUTO: 7 % (ref 4–12)
MONOCYTES NFR BLD AUTO: 7 % (ref 4–12)
NEUTROPHILS # BLD AUTO: 7.93 THOUSANDS/ÂΜL (ref 1.85–7.62)
NEUTROPHILS # BLD AUTO: 8.21 THOUSANDS/ÂΜL (ref 1.85–7.62)
NEUTS SEG NFR BLD AUTO: 73 % (ref 43–75)
NEUTS SEG NFR BLD AUTO: 75 % (ref 43–75)
NRBC BLD AUTO-RTO: 0 /100 WBCS
NRBC BLD AUTO-RTO: 0 /100 WBCS
PLATELET # BLD AUTO: 144 THOUSANDS/UL (ref 149–390)
PLATELET # BLD AUTO: 163 THOUSANDS/UL (ref 149–390)
PMV BLD AUTO: 11.2 FL (ref 8.9–12.7)
PMV BLD AUTO: 11.4 FL (ref 8.9–12.7)
POTASSIUM SERPL-SCNC: 4.4 MMOL/L (ref 3.5–5.3)
PROT SERPL-MCNC: 5.2 G/DL (ref 6.4–8.4)
RBC # BLD AUTO: 2.29 MILLION/UL (ref 3.81–5.12)
RBC # BLD AUTO: 2.5 MILLION/UL (ref 3.81–5.12)
SODIUM SERPL-SCNC: 137 MMOL/L (ref 135–147)
UNIT DISPENSE STATUS: NORMAL
UNIT DISPENSE STATUS: NORMAL
UNIT PRODUCT CODE: NORMAL
UNIT PRODUCT CODE: NORMAL
UNIT PRODUCT VOLUME: 250 ML
UNIT PRODUCT VOLUME: 350 ML
UNIT RH: NORMAL
UNIT RH: NORMAL
WBC # BLD AUTO: 10.9 THOUSAND/UL (ref 4.31–10.16)
WBC # BLD AUTO: 10.94 THOUSAND/UL (ref 4.31–10.16)

## 2025-05-21 PROCEDURE — 85025 COMPLETE CBC W/AUTO DIFF WBC: CPT | Performed by: OBSTETRICS & GYNECOLOGY

## 2025-05-21 PROCEDURE — P9016 RBC LEUKOCYTES REDUCED: HCPCS

## 2025-05-21 PROCEDURE — 80053 COMPREHEN METABOLIC PANEL: CPT | Performed by: OBSTETRICS & GYNECOLOGY

## 2025-05-21 PROCEDURE — 99024 POSTOP FOLLOW-UP VISIT: CPT | Performed by: OBSTETRICS & GYNECOLOGY

## 2025-05-21 RX ORDER — NIFEDIPINE 30 MG/1
30 TABLET, EXTENDED RELEASE ORAL ONCE
Status: COMPLETED | OUTPATIENT
Start: 2025-05-21 | End: 2025-05-21

## 2025-05-21 RX ORDER — NIFEDIPINE 30 MG/1
30 TABLET, EXTENDED RELEASE ORAL DAILY
Status: DISCONTINUED | OUTPATIENT
Start: 2025-05-21 | End: 2025-05-21

## 2025-05-21 RX ORDER — NIFEDIPINE 30 MG/1
60 TABLET, EXTENDED RELEASE ORAL DAILY
Status: DISCONTINUED | OUTPATIENT
Start: 2025-05-22 | End: 2025-05-23 | Stop reason: HOSPADM

## 2025-05-21 RX ORDER — IBUPROFEN 600 MG/1
600 TABLET, FILM COATED ORAL EVERY 6 HOURS PRN
Status: DISCONTINUED | OUTPATIENT
Start: 2025-05-21 | End: 2025-05-21

## 2025-05-21 RX ORDER — IBUPROFEN 600 MG/1
600 TABLET, FILM COATED ORAL EVERY 6 HOURS PRN
Status: DISCONTINUED | OUTPATIENT
Start: 2025-05-21 | End: 2025-05-23 | Stop reason: HOSPADM

## 2025-05-21 RX ADMIN — NIFEDIPINE 30 MG: 30 TABLET, FILM COATED, EXTENDED RELEASE ORAL at 19:25

## 2025-05-21 RX ADMIN — IBUPROFEN 600 MG: 600 TABLET ORAL at 12:34

## 2025-05-21 RX ADMIN — NIFEDIPINE 30 MG: 30 TABLET, FILM COATED, EXTENDED RELEASE ORAL at 14:17

## 2025-05-21 RX ADMIN — ACETAMINOPHEN 650 MG: 325 TABLET, FILM COATED ORAL at 23:32

## 2025-05-21 RX ADMIN — ACETAMINOPHEN 650 MG: 325 TABLET, FILM COATED ORAL at 18:32

## 2025-05-21 RX ADMIN — IBUPROFEN 600 MG: 600 TABLET ORAL at 18:32

## 2025-05-21 RX ADMIN — ACETAMINOPHEN 650 MG: 325 TABLET, FILM COATED ORAL at 12:34

## 2025-05-21 RX ADMIN — KETOROLAC TROMETHAMINE 30 MG: 30 INJECTION, SOLUTION INTRAMUSCULAR at 06:33

## 2025-05-21 RX ADMIN — HUMAN RHO(D) IMMUNE GLOBULIN 300 MCG: 300 INJECTION, SOLUTION INTRAMUSCULAR at 06:33

## 2025-05-21 RX ADMIN — ACETAMINOPHEN 650 MG: 325 TABLET, FILM COATED ORAL at 06:35

## 2025-05-21 NOTE — QUICK NOTE
Hgb only up to 7.4 from 6.7 after 2 u pRBC today  However, pt feeling improved from before transfusion  Denies dizziness, weakness    HR and BP stable  Exam benign    Discussed with Dr. Rasmussen, will recheck in AM unless clinical change

## 2025-05-21 NOTE — PLAN OF CARE
Problem: PAIN - ADULT  Goal: Verbalizes/displays adequate comfort level or baseline comfort level  Description: Interventions:  - Encourage patient to monitor pain and request assistance  - Assess pain using appropriate pain scale  - Administer analgesics as ordered based on type and severity of pain and evaluate response  - Implement non-pharmacological measures as appropriate and evaluate response  - Consider cultural and social influences on pain and pain management  - Notify physician/advanced practitioner if interventions unsuccessful or patient reports new pain  - Educate patient/family on pain management process including their role and importance of  reporting pain   - Provide non-pharmacologic/complimentary pain relief interventions  Outcome: Progressing     Problem: INFECTION - ADULT  Goal: Absence or prevention of progression during hospitalization  Description: INTERVENTIONS:  - Assess and monitor for signs and symptoms of infection  - Monitor lab/diagnostic results  - Monitor all insertion sites, i.e. indwelling lines, tubes, and drains  - Monitor endotracheal if appropriate and nasal secretions for changes in amount and color  - [place] appropriate cooling/warming therapies per order  - Administer medications as ordered  - Instruct and encourage patient and family to use good hand hygiene technique  - Identify and instruct in appropriate isolation precautions for identified infection/condition  Outcome: Progressing  Goal: Absence of fever/infection during neutropenic period  Description: INTERVENTIONS:  - Monitor WBC  - Perform strict hand hygiene  - Limit to healthy visitors only  - No plants, dried, fresh or silk flowers with rivera in patient room  Outcome: Progressing     Problem: SAFETY ADULT  Goal: Patient will remain free of falls  Description: INTERVENTIONS:  - Educate patient/family on patient safety including physical limitations  - Instruct patient to call for assistance with activity   -  Consider consulting OT/PT to assist with strengthening/mobility based on AM PAC & JH-HLM score  - Consult OT/PT to assist with strengthening/mobility   - Keep Call bell within reach  - Keep bed low and locked with side rails adjusted as appropriate  - Keep care items and personal belongings within reach  - Initiate and maintain comfort rounds  - Make Fall Risk Sign visible to staff  - Apply yellow socks and bracelet for high fall risk patients  - Consider moving patient to room near nurses station  Outcome: Progressing  Goal: Maintain or return to baseline ADL function  Description: INTERVENTIONS:  -  Assess patient's ability to carry out ADLs; assess patient's baseline for ADL function and identify physical deficits which impact ability to perform ADLs (bathing, care of mouth/teeth, toileting, grooming, dressing, etc.)  - Assess/evaluate cause of self-care deficits   - Assess range of motion  - Assess patient's mobility; develop plan if impaired  - Assess patient's need for assistive devices and provide as appropriate  - Encourage maximum independence but intervene and supervise when necessary  - Involve family in performance of ADLs  - Assess for home care needs following discharge   - Consider OT consult to assist with ADL evaluation and planning for discharge  - Provide patient education as appropriate  - Monitor functional capacity and physical performance, use of AM PAC & JH-HLM   - Monitor gait, balance and fatigue with ambulation    Outcome: Progressing  Goal: Maintains/Returns to pre admission functional level  Description: INTERVENTIONS:  - Perform AM-PAC 6 Click Basic Mobility/ Daily Activity assessment daily.  - Set and communicate daily mobility goal to care team and patient/family/caregiver.   - Collaborate with rehabilitation services on mobility goals if consulted  - Out of bed for toileting  - Record patient progress and toleration of activity level   Outcome: Progressing     Problem: DISCHARGE  PLANNING  Goal: Discharge to home or other facility with appropriate resources  Description: INTERVENTIONS:  - Identify barriers to discharge w/patient and caregiver  - Arrange for needed discharge resources and transportation as appropriate  - Identify discharge learning needs (meds, wound care, etc.)  - Arrange for interpretive services to assist at discharge as needed  - Refer to Case Management Department for coordinating discharge planning if the patient needs post-hospital services based on physician/advanced practitioner order or complex needs related to functional status, cognitive ability, or social support system  Outcome: Progressing     Problem: Knowledge Deficit  Goal: Patient/family/caregiver demonstrates understanding of disease process, treatment plan, medications, and discharge instructions  Description: Complete learning assessment and assess knowledge base.  Interventions:  - Provide teaching at level of understanding  - Provide teaching via preferred learning methods  Outcome: Progressing     Problem: POSTPARTUM  Goal: Experiences normal postpartum course  Description: INTERVENTIONS:  - Monitor maternal vital signs  - Assess uterine involution and lochia  Outcome: Progressing  Goal: Appropriate maternal -  bonding  Description: INTERVENTIONS:  - Identify family support  - Assess for appropriate maternal/infant bonding   -Encourage maternal/infant bonding opportunities  - Referral to  or  as needed  Outcome: Progressing  Goal: Establishment of infant feeding pattern  Description: INTERVENTIONS:  - Assess breast/bottle feeding  - Refer to lactation as needed  Outcome: Progressing  Goal: Incision(s), wounds(s) or drain site(s) healing without S/S of infection  Description: INTERVENTIONS  - Assess and document dressing, incision, wound bed, drain sites and surrounding tissue  - Provide patient and family education    Outcome: Progressing

## 2025-05-21 NOTE — ASSESSMENT & PLAN NOTE
- uncomplicated repeat  delivery with  cc  - large 16 cm rectus sheath hematoma noted  - patient is now status post 2 units RBC transfusion, alleviation of clinical symptoms  - Hg 12.6 > 8.0 > 6.7 > + 2 units RBCs > 7.4 > 7.2 > will now order another unit RBC and recheck Hgb 4 hours post-transfusion

## 2025-05-21 NOTE — ASSESSMENT & PLAN NOTE
- routine postoperative care  - ambulation and SCDs for VTE ppx  - if Hgb continues to be stable after third unit RBC transfused today, will consider Lovenox for VTE prophylaxis given increased risk of VTE in this scenario

## 2025-05-21 NOTE — PROGRESS NOTES
Progress Note - OB/GYN  Post-Partum Physician Note   Erika Coburn 39 y.o. female MRN: [id number]  Unit/Bed#: -01 Encounter: [id number]  Assessment:  39 y.o. year-old , post-op/postpartum day #2 status-post repeat LTCS complicated by postoperative hematoma resulting n acute blood loss anemia.    Plan:  Assessment & Plan   delivery delivered  - routine postoperative care  - ambulation and SCDs for VTE ppx  - if Hgb continues to be stable after third unit RBC transfused today, will consider Lovenox for VTE prophylaxis given increased risk of VTE in this scenario  Postoperative anemia due to acute blood loss  - uncomplicated repeat  delivery with  cc  - large 16 cm rectus sheath hematoma noted  - patient is now status post 2 units RBC transfusion, alleviation of clinical symptoms  - Hg 12.6 > 8.0 > 6.7 > + 2 units RBCs > 7.4 > 7.2 > will now order another unit RBC and recheck Hgb 4 hours post-transfusion  Rh negative state in antepartum period  - s/p Rhogam 25 given  is Rh positive    ______________________________________________  Patient is postop and postpartum day #2 following repeat LTCS complicated by acute blood loss anemia, hematoma     Subjective:   No acute events overnight. OOB to bathroom, ambulating and denies lightheadedness or dizziness. Denies HA, visual changes, epigastric pain. Denies shortness of breath or chest pain. Ambulating and voiding without difficulty.  Good urine output. Minimal lochia.      Objective:   Vitals:    25 1922 25 2255 25 0315 25 0740   BP: 133/60 116/57 124/71 162/83   BP Location: Right arm Right arm Right arm Right arm   Pulse: 87 84 75 80   Resp: 18 18 18    Temp: 98.1 °F (36.7 °C) 98.6 °F (37 °C) 98.9 °F (37.2 °C) 97.9 °F (36.6 °C)   TempSrc: Oral Oral Oral Oral   SpO2: 99% 99% 99% 98%   Weight:       Height:           Intake/Output Summary (Last 24 hours) at 2025 0812  Last data filed  "at 5/20/2025 1601  Gross per 24 hour   Intake 1250 ml   Output 1100 ml   Net 150 ml       Physical Exam:  General: AOx3, NAD  Lungs: CTAB  CV: RRR  Abdomen: soft, fundus firm below umbilicus, appropriately tender; incision c/d/i  Extremities: nontender without edema bilaterally      Labs/Tests:   Lab Results   Component Value Date/Time    HGB 7.2 (L) 05/21/2025 07:40 AM    HGB 7.4 (L) 05/20/2025 09:08 PM    HGB 6.7 (L) 05/20/2025 05:18 AM    HGB 8.0 (L) 05/20/2025 12:07 AM    HGB 12.6 05/19/2025 09:29 AM     (L) 05/21/2025 07:40 AM     05/20/2025 09:08 PM     05/20/2025 05:18 AM     05/20/2025 12:07 AM     05/19/2025 09:29 AM    WBC 10.94 (H) 05/21/2025 07:40 AM    WBC 10.99 (H) 05/20/2025 09:08 PM    WBC 16.40 (H) 05/20/2025 05:18 AM    WBC 24.07 (H) 05/20/2025 12:07 AM    WBC 8.20 05/19/2025 09:29 AM    CREATININE 0.73 05/19/2025 09:29 AM    ALT 13 05/19/2025 09:29 AM    AST 18 05/19/2025 09:29 AM        Brief OB Lab review:  ABO Grouping   Date Value Ref Range Status   05/20/2025 O  Final      Rh Factor   Date Value Ref Range Status   05/20/2025 Negative  Final     Rh Type   Date Value Ref Range Status   02/26/2025 Negative  Final     Comment:     Please note: Prior records for this patient's ABO / Rh type are not  available for additional verification.      No results found for: \"ANTIBODYSCR\"  No results found for: \"RUBM\"    MEDS:     Current Facility-Administered Medications:     acetaminophen (TYLENOL) tablet 650 mg, Q6H FARIHA    calcium carbonate (TUMS) chewable tablet 1,000 mg, TID PRN    diphenhydrAMINE (BENADRYL) tablet 25 mg, Q6H PRN    ketorolac (TORADOL) injection 30 mg, Q6H PRN    lactated ringers infusion, Continuous, Last Rate: Stopped (05/20/25 0000)    nalbuphine (NUBAIN) injection 10 mg, Q3H PRN    oxyCODONE (ROXICODONE) IR tablet 10 mg, Q4H PRN    oxyCODONE (ROXICODONE) IR tablet 5 mg, Q4H PRN    simethicone (MYLICON) chewable tablet 80 mg, Q6H PRN  Invasive " Devices       Peripheral Intravenous Line  Duration             Peripheral IV 25 Left;Ventral (anterior) Forearm 1 day                  IMAGING:  CT ABDOMEN AND PELVIS WITH IV CONTRAST     INDICATION: postoperative blood loss. Status post  yesterday.     COMPARISON: None.     TECHNIQUE: CT examination of the abdomen and pelvis was performed. Multiplanar 2D reformatted images were created from the source data.     This examination, like all CT scans performed in the [facility], was performed utilizing techniques to minimize radiation dose exposure, including the use of iterative reconstruction and automated exposure control. Radiation dose length   product (DLP) for this visit: 607 mGy-cm.     IV Contrast: 100 mL of iohexol (OMNIPAQUE)  Enteric Contrast: Not administered.     FINDINGS:     ABDOMEN     LOWER CHEST: No clinically significant abnormality in the visualized lower chest.     LIVER/BILIARY TREE: Unremarkable.     GALLBLADDER: No calcified gallstones. No pericholecystic inflammatory change.     SPLEEN: Unremarkable.     PANCREAS: Unremarkable.     ADRENAL GLANDS: Unremarkable.     KIDNEYS/URETERS: Unremarkable. No hydronephrosis.     STOMACH AND BOWEL: Unremarkable.     APPENDIX: No findings to suggest appendicitis.     ABDOMINOPELVIC CAVITY: Small to moderate hemoperitoneum, with large component in the pelvis anterior to the uterus measuring approximately 8.6 x 13 x 16 cm in AP, transverse and longitudinal dimensions. No definite areas of contrast blush identified to   indicate active hemorrhage.     Small pneumoperitoneum consistent with recent .     No adenopathy.     VESSELS: Unremarkable for patient's age.     PELVIS     REPRODUCTIVE ORGANS: Expected appearance of the postpartum uterus status post .     URINARY BLADDER: Unremarkable.     ABDOMINAL WALL/INGUINAL REGIONS: Postsurgical changes in the anterior abdominal wall, with extensive rectus sheath  hematoma in the pelvic wall measuring approximately 3 x 14 x 13 cm. No contrast blush identified to indicate active hemorrhage.     BONES: No acute fracture or suspicious osseous lesion.     IMPRESSION:     Large 16 cm hematoma in the pelvis, with no definite evidence of active hemorrhage.     Extensive rectus sheath hematoma in the pelvic wall, with no definite evidence of active hemorrhage.        I personally discussed this study with Dr. Tse on 5/20/2025 8:04 AM.              Workstation performed: [id number]    Abbi Crook MD  5/21/2025 8:12 AM

## 2025-05-21 NOTE — PLAN OF CARE
Problem: PAIN - ADULT  Goal: Verbalizes/displays adequate comfort level or baseline comfort level  Description: Interventions:  - Encourage patient to monitor pain and request assistance  - Assess pain using appropriate pain scale  - Administer analgesics as ordered based on type and severity of pain and evaluate response  - Implement non-pharmacological measures as appropriate and evaluate response  - Consider cultural and social influences on pain and pain management  - Notify physician/advanced practitioner if interventions unsuccessful or patient reports new pain  - Educate patient/family on pain management process including their role and importance of  reporting pain   - Provide non-pharmacologic/complimentary pain relief interventions  Outcome: Progressing     Problem: INFECTION - ADULT  Goal: Absence or prevention of progression during hospitalization  Description: INTERVENTIONS:  - Assess and monitor for signs and symptoms of infection  - Monitor lab/diagnostic results  - Monitor all insertion sites, i.e. indwelling lines, tubes, and drains  - Monitor endotracheal if appropriate and nasal secretions for changes in amount and color  - [place] appropriate cooling/warming therapies per order  - Administer medications as ordered  - Instruct and encourage patient and family to use good hand hygiene technique  - Identify and instruct in appropriate isolation precautions for identified infection/condition  Outcome: Progressing  Goal: Absence of fever/infection during neutropenic period  Description: INTERVENTIONS:  - Monitor WBC  - Perform strict hand hygiene  - Limit to healthy visitors only  - No plants, dried, fresh or silk flowers with rivera in patient room  Outcome: Progressing     Problem: SAFETY ADULT  Goal: Patient will remain free of falls  Description: INTERVENTIONS:  - Educate patient/family on patient safety including physical limitations  - Instruct patient to call for assistance with activity   -  Consider consulting OT/PT to assist with strengthening/mobility based on AM PAC & JH-HLM score  - Consult OT/PT to assist with strengthening/mobility   - Keep Call bell within reach  - Keep bed low and locked with side rails adjusted as appropriate  - Keep care items and personal belongings within reach  - Initiate and maintain comfort rounds  - Make Fall Risk Sign visible to staff  - Apply yellow socks and bracelet for high fall risk patients  - Consider moving patient to room near nurses station  Outcome: Progressing  Goal: Maintain or return to baseline ADL function  Description: INTERVENTIONS:  -  Assess patient's ability to carry out ADLs; assess patient's baseline for ADL function and identify physical deficits which impact ability to perform ADLs (bathing, care of mouth/teeth, toileting, grooming, dressing, etc.)  - Assess/evaluate cause of self-care deficits   - Assess range of motion  - Assess patient's mobility; develop plan if impaired  - Assess patient's need for assistive devices and provide as appropriate  - Encourage maximum independence but intervene and supervise when necessary  - Involve family in performance of ADLs  - Assess for home care needs following discharge   - Consider OT consult to assist with ADL evaluation and planning for discharge  - Provide patient education as appropriate  - Monitor functional capacity and physical performance, use of AM PAC & JH-HLM   - Monitor gait, balance and fatigue with ambulation    Outcome: Progressing  Goal: Maintains/Returns to pre admission functional level  Description: INTERVENTIONS:  - Perform AM-PAC 6 Click Basic Mobility/ Daily Activity assessment daily.  - Set and communicate daily mobility goal to care team and patient/family/caregiver.   - Collaborate with rehabilitation services on mobility goals if consulted  - Out of bed for toileting  - Record patient progress and toleration of activity level   Outcome: Progressing     Problem: DISCHARGE  PLANNING  Goal: Discharge to home or other facility with appropriate resources  Description: INTERVENTIONS:  - Identify barriers to discharge w/patient and caregiver  - Arrange for needed discharge resources and transportation as appropriate  - Identify discharge learning needs (meds, wound care, etc.)  - Arrange for interpretive services to assist at discharge as needed  - Refer to Case Management Department for coordinating discharge planning if the patient needs post-hospital services based on physician/advanced practitioner order or complex needs related to functional status, cognitive ability, or social support system  Outcome: Progressing     Problem: Knowledge Deficit  Goal: Patient/family/caregiver demonstrates understanding of disease process, treatment plan, medications, and discharge instructions  Description: Complete learning assessment and assess knowledge base.  Interventions:  - Provide teaching at level of understanding  - Provide teaching via preferred learning methods  Outcome: Progressing     Problem: POSTPARTUM  Goal: Experiences normal postpartum course  Description: INTERVENTIONS:  - Monitor maternal vital signs  - Assess uterine involution and lochia  Outcome: Progressing  Goal: Appropriate maternal -  bonding  Description: INTERVENTIONS:  - Identify family support  - Assess for appropriate maternal/infant bonding   -Encourage maternal/infant bonding opportunities  - Referral to  or  as needed  Outcome: Progressing  Goal: Establishment of infant feeding pattern  Description: INTERVENTIONS:  - Assess breast/bottle feeding  - Refer to lactation as needed  Outcome: Progressing  Goal: Incision(s), wounds(s) or drain site(s) healing without S/S of infection  Description: INTERVENTIONS  - Assess and document dressing, incision, wound bed, drain sites and surrounding tissue  - Provide patient and family education  Outcome: Progressing

## 2025-05-22 LAB
ABO GROUP BLD BPU: NORMAL
ABO GROUP BLD BPU: NORMAL
ALBUMIN SERPL BCG-MCNC: 3.1 G/DL (ref 3.5–5)
ALP SERPL-CCNC: 78 U/L (ref 34–104)
ALT SERPL W P-5'-P-CCNC: 12 U/L (ref 7–52)
ANION GAP SERPL CALCULATED.3IONS-SCNC: 5 MMOL/L (ref 4–13)
AST SERPL W P-5'-P-CCNC: 16 U/L (ref 13–39)
BASOPHILS # BLD AUTO: 0.03 THOUSANDS/ÂΜL (ref 0–0.1)
BASOPHILS NFR BLD AUTO: 0 % (ref 0–1)
BILIRUB SERPL-MCNC: 0.56 MG/DL (ref 0.2–1)
BPU ID: NORMAL
BPU ID: NORMAL
BUN SERPL-MCNC: 9 MG/DL (ref 5–25)
CALCIUM ALBUM COR SERPL-MCNC: 8.8 MG/DL (ref 8.3–10.1)
CALCIUM SERPL-MCNC: 8.1 MG/DL (ref 8.4–10.2)
CHLORIDE SERPL-SCNC: 111 MMOL/L (ref 96–108)
CO2 SERPL-SCNC: 23 MMOL/L (ref 21–32)
CREAT SERPL-MCNC: 0.64 MG/DL (ref 0.6–1.3)
CROSSMATCH: NORMAL
CROSSMATCH: NORMAL
EOSINOPHIL # BLD AUTO: 0.25 THOUSAND/ÂΜL (ref 0–0.61)
EOSINOPHIL NFR BLD AUTO: 2 % (ref 0–6)
ERYTHROCYTE [DISTWIDTH] IN BLOOD BY AUTOMATED COUNT: 15.8 % (ref 11.6–15.1)
GFR SERPL CREATININE-BSD FRML MDRD: 113 ML/MIN/1.73SQ M
GLUCOSE SERPL-MCNC: 102 MG/DL (ref 65–140)
HCT VFR BLD AUTO: 28.2 % (ref 34.8–46.1)
HGB BLD-MCNC: 9.2 G/DL (ref 11.5–15.4)
IMM GRANULOCYTES # BLD AUTO: 0.08 THOUSAND/UL (ref 0–0.2)
IMM GRANULOCYTES NFR BLD AUTO: 1 % (ref 0–2)
LYMPHOCYTES # BLD AUTO: 2.11 THOUSANDS/ÂΜL (ref 0.6–4.47)
LYMPHOCYTES NFR BLD AUTO: 19 % (ref 14–44)
MCH RBC QN AUTO: 31 PG (ref 26.8–34.3)
MCHC RBC AUTO-ENTMCNC: 32.6 G/DL (ref 31.4–37.4)
MCV RBC AUTO: 95 FL (ref 82–98)
MONOCYTES # BLD AUTO: 0.78 THOUSAND/ÂΜL (ref 0.17–1.22)
MONOCYTES NFR BLD AUTO: 7 % (ref 4–12)
NEUTROPHILS # BLD AUTO: 8 THOUSANDS/ÂΜL (ref 1.85–7.62)
NEUTS SEG NFR BLD AUTO: 71 % (ref 43–75)
NRBC BLD AUTO-RTO: 0 /100 WBCS
PLATELET # BLD AUTO: 186 THOUSANDS/UL (ref 149–390)
PMV BLD AUTO: 11.2 FL (ref 8.9–12.7)
POTASSIUM SERPL-SCNC: 4 MMOL/L (ref 3.5–5.3)
PROT SERPL-MCNC: 5.6 G/DL (ref 6.4–8.4)
RBC # BLD AUTO: 2.97 MILLION/UL (ref 3.81–5.12)
SODIUM SERPL-SCNC: 139 MMOL/L (ref 135–147)
UNIT DISPENSE STATUS: NORMAL
UNIT DISPENSE STATUS: NORMAL
UNIT PRODUCT CODE: NORMAL
UNIT PRODUCT CODE: NORMAL
UNIT PRODUCT VOLUME: 350 ML
UNIT PRODUCT VOLUME: 350 ML
UNIT RH: NORMAL
UNIT RH: NORMAL
WBC # BLD AUTO: 11.25 THOUSAND/UL (ref 4.31–10.16)

## 2025-05-22 PROCEDURE — 85025 COMPLETE CBC W/AUTO DIFF WBC: CPT | Performed by: OBSTETRICS & GYNECOLOGY

## 2025-05-22 PROCEDURE — 80053 COMPREHEN METABOLIC PANEL: CPT | Performed by: OBSTETRICS & GYNECOLOGY

## 2025-05-22 PROCEDURE — 99024 POSTOP FOLLOW-UP VISIT: CPT | Performed by: OBSTETRICS & GYNECOLOGY

## 2025-05-22 PROCEDURE — NC001 PR NO CHARGE: Performed by: OBSTETRICS & GYNECOLOGY

## 2025-05-22 RX ORDER — IBUPROFEN 600 MG/1
600 TABLET, FILM COATED ORAL EVERY 6 HOURS PRN
Start: 2025-05-22

## 2025-05-22 RX ORDER — DIPHENHYDRAMINE HCL 25 MG
25 TABLET ORAL EVERY 6 HOURS PRN
Start: 2025-05-22

## 2025-05-22 RX ORDER — ACETAMINOPHEN 325 MG/1
650 TABLET ORAL EVERY 6 HOURS SCHEDULED
Start: 2025-05-22 | End: 2025-05-23

## 2025-05-22 RX ORDER — ACETAMINOPHEN 325 MG/1
650 TABLET ORAL EVERY 6 HOURS PRN
Status: DISCONTINUED | OUTPATIENT
Start: 2025-05-22 | End: 2025-05-23 | Stop reason: HOSPADM

## 2025-05-22 RX ORDER — LABETALOL 200 MG/1
200 TABLET, FILM COATED ORAL EVERY 12 HOURS SCHEDULED
Status: DISCONTINUED | OUTPATIENT
Start: 2025-05-22 | End: 2025-05-23 | Stop reason: HOSPADM

## 2025-05-22 RX ADMIN — ACETAMINOPHEN 650 MG: 325 TABLET, FILM COATED ORAL at 05:59

## 2025-05-22 RX ADMIN — LABETALOL HYDROCHLORIDE 200 MG: 200 TABLET, FILM COATED ORAL at 16:16

## 2025-05-22 RX ADMIN — ACETAMINOPHEN 650 MG: 325 TABLET, FILM COATED ORAL at 13:38

## 2025-05-22 RX ADMIN — IBUPROFEN 600 MG: 600 TABLET ORAL at 09:11

## 2025-05-22 RX ADMIN — NIFEDIPINE 60 MG: 30 TABLET, FILM COATED, EXTENDED RELEASE ORAL at 09:11

## 2025-05-22 RX ADMIN — IBUPROFEN 600 MG: 600 TABLET ORAL at 21:26

## 2025-05-22 RX ADMIN — ACETAMINOPHEN 650 MG: 325 TABLET, FILM COATED ORAL at 20:05

## 2025-05-22 RX ADMIN — IBUPROFEN 600 MG: 600 TABLET ORAL at 15:33

## 2025-05-22 NOTE — PLAN OF CARE
Problem: PAIN - ADULT  Goal: Verbalizes/displays adequate comfort level or baseline comfort level  Description: Interventions:  - Encourage patient to monitor pain and request assistance  - Assess pain using appropriate pain scale  - Administer analgesics as ordered based on type and severity of pain and evaluate response  - Implement non-pharmacological measures as appropriate and evaluate response  - Consider cultural and social influences on pain and pain management  - Notify physician/advanced practitioner if interventions unsuccessful or patient reports new pain  - Educate patient/family on pain management process including their role and importance of  reporting pain   - Provide non-pharmacologic/complimentary pain relief interventions  Outcome: Progressing     Problem: INFECTION - ADULT  Goal: Absence or prevention of progression during hospitalization  Description: INTERVENTIONS:  - Assess and monitor for signs and symptoms of infection  - Monitor lab/diagnostic results  - Monitor all insertion sites, i.e. indwelling lines, tubes, and drains  - Monitor endotracheal if appropriate and nasal secretions for changes in amount and color  - [place] appropriate cooling/warming therapies per order  - Administer medications as ordered  - Instruct and encourage patient and family to use good hand hygiene technique  - Identify and instruct in appropriate isolation precautions for identified infection/condition  Outcome: Progressing  Goal: Absence of fever/infection during neutropenic period  Description: INTERVENTIONS:  - Monitor WBC  - Perform strict hand hygiene  - Limit to healthy visitors only  - No plants, dried, fresh or silk flowers with rivera in patient room  Outcome: Progressing     Problem: SAFETY ADULT  Goal: Patient will remain free of falls  Description: INTERVENTIONS:  - Educate patient/family on patient safety including physical limitations  - Instruct patient to call for assistance with activity   -  Consider consulting OT/PT to assist with strengthening/mobility based on AM PAC & JH-HLM score  - Consult OT/PT to assist with strengthening/mobility   - Keep Call bell within reach  - Keep bed low and locked with side rails adjusted as appropriate  - Keep care items and personal belongings within reach  - Initiate and maintain comfort rounds  - Make Fall Risk Sign visible to staff    - Apply yellow socks and bracelet for high fall risk patients  - Consider moving patient to room near nurses station  Outcome: Progressing  Goal: Maintain or return to baseline ADL function  Description: INTERVENTIONS:  -  Assess patient's ability to carry out ADLs; assess patient's baseline for ADL function and identify physical deficits which impact ability to perform ADLs (bathing, care of mouth/teeth, toileting, grooming, dressing, etc.)  - Assess/evaluate cause of self-care deficits   - Assess range of motion  - Assess patient's mobility; develop plan if impaired  - Assess patient's need for assistive devices and provide as appropriate  - Encourage maximum independence but intervene and supervise when necessary  - Involve family in performance of ADLs  - Assess for home care needs following discharge   - Consider OT consult to assist with ADL evaluation and planning for discharge  - Provide patient education as appropriate  - Monitor functional capacity and physical performance, use of AM PAC & JH-HLM   - Monitor gait, balance and fatigue with ambulation    Outcome: Progressing  Goal: Maintains/Returns to pre admission functional level  Description: INTERVENTIONS:  - Perform AM-PAC 6 Click Basic Mobility/ Daily Activity assessment daily.  - Set and communicate daily mobility goal to care team and patient/family/caregiver.   - Collaborate with rehabilitation services on mobility goals if consulted    - Out of bed for toileting  - Record patient progress and toleration of activity level   Outcome: Progressing     Problem: DISCHARGE  PLANNING  Goal: Discharge to home or other facility with appropriate resources  Description: INTERVENTIONS:  - Identify barriers to discharge w/patient and caregiver  - Arrange for needed discharge resources and transportation as appropriate  - Identify discharge learning needs (meds, wound care, etc.)  - Arrange for interpretive services to assist at discharge as needed  - Refer to Case Management Department for coordinating discharge planning if the patient needs post-hospital services based on physician/advanced practitioner order or complex needs related to functional status, cognitive ability, or social support system  Outcome: Progressing     Problem: Knowledge Deficit  Goal: Patient/family/caregiver demonstrates understanding of disease process, treatment plan, medications, and discharge instructions  Description: Complete learning assessment and assess knowledge base.  Interventions:  - Provide teaching at level of understanding  - Provide teaching via preferred learning methods  Outcome: Progressing     Problem: POSTPARTUM  Goal: Experiences normal postpartum course  Description: INTERVENTIONS:  - Monitor maternal vital signs  - Assess uterine involution and lochia  Outcome: Progressing  Goal: Appropriate maternal -  bonding  Description: INTERVENTIONS:  - Identify family support  - Assess for appropriate maternal/infant bonding   -Encourage maternal/infant bonding opportunities  - Referral to  or  as needed  Outcome: Progressing  Goal: Establishment of infant feeding pattern  Description: INTERVENTIONS:  - Assess breast/bottle feeding  - Refer to lactation as needed  Outcome: Progressing  Goal: Incision(s), wounds(s) or drain site(s) healing without S/S of infection  Description: INTERVENTIONS  - Assess and document dressing, incision, wound bed, drain sites and surrounding tissue  - Provide patient and family education    Outcome: Progressing

## 2025-05-22 NOTE — ASSESSMENT & PLAN NOTE
- uncomplicated repeat  delivery with  cc  - large 16 cm rectus sheath hematoma noted  - patient is now status post 2 units RBC transfusion, alleviation of clinical symptoms  - Hg 12.6 > 8.0 > 6.7 > + 2 units RBCs > 7.4 > 7.2 > 9.2 this AM.

## 2025-05-22 NOTE — DISCHARGE INSTR - AVS FIRST PAGE
Call for any fevers >101F, or chills/nausea/vomiting >24hrs, pain not well controlled with pain meds, or vaginal bleeding >2pads/hr.      Call an ambulance or have someone drive you to the nearest ER with severe pain, very heavy bleeding, or passing out.      Follow up with your OB office early next week.       You should take your blood pressure at home twice daily.     Normal-range blood pressures in the postpartum period are less than 140 for the top number (systolic) AND less than 90 for the bottom number (diastolic).    Mildly elevated blood pressures in the postpartum period are 140-159 systolic OR  diastolic. Some mildly elevated blood pressures are expected due to your diagnosis of gestational hypertension.     If your blood pressures are persistently in the 150s systolic or 100s diastolic, you should call the office, as initiation/titration of blood pressure medication may be necessary.    Severe-range blood pressures in the postpartum period are 160 or greater systolic  or greater diastolic. You should NOT have any severely elevated blood pressures at home.     If your blood pressure is greater than 160/110, you should promptly call [facility] at [phone] and proceed to the hospital for evaluation for pre-eclampsia.    Symptoms of Pre-Eclampsia include headache that does not go away with Tylenol, changes in vision such as blurred vision or spots in your vision, chest pain, shortness of breath, pain in the upper right side of your abdomen, or sudden onset or worsening of swelling.    If you develop any of the above symptoms, you should promptly call [facility] at [phone] and present to the hospital for evaluation of pre-eclampsia.

## 2025-05-22 NOTE — PLAN OF CARE
Problem: PAIN - ADULT  Goal: Verbalizes/displays adequate comfort level or baseline comfort level  Description: Interventions:  - Encourage patient to monitor pain and request assistance  - Assess pain using appropriate pain scale  - Administer analgesics as ordered based on type and severity of pain and evaluate response  - Implement non-pharmacological measures as appropriate and evaluate response  - Consider cultural and social influences on pain and pain management  - Notify physician/advanced practitioner if interventions unsuccessful or patient reports new pain  - Educate patient/family on pain management process including their role and importance of  reporting pain   - Provide non-pharmacologic/complimentary pain relief interventions  Outcome: Progressing     Problem: INFECTION - ADULT  Goal: Absence or prevention of progression during hospitalization  Description: INTERVENTIONS:  - Assess and monitor for signs and symptoms of infection  - Monitor lab/diagnostic results  - Monitor all insertion sites, i.e. indwelling lines, tubes, and drains  - Monitor endotracheal if appropriate and nasal secretions for changes in amount and color  - [place] appropriate cooling/warming therapies per order  - Administer medications as ordered  - Instruct and encourage patient and family to use good hand hygiene technique  - Identify and instruct in appropriate isolation precautions for identified infection/condition  Outcome: Progressing  Goal: Absence of fever/infection during neutropenic period  Description: INTERVENTIONS:  - Monitor WBC  - Perform strict hand hygiene  - Limit to healthy visitors only  - No plants, dried, fresh or silk flowers with rivera in patient room  Outcome: Progressing     Problem: SAFETY ADULT  Goal: Patient will remain free of falls  Description: INTERVENTIONS:  - Educate patient/family on patient safety including physical limitations  - Instruct patient to call for assistance with activity   -  Consider consulting OT/PT to assist with strengthening/mobility based on AM PAC & JH-HLM score  - Consult OT/PT to assist with strengthening/mobility   - Keep Call bell within reach  - Keep bed low and locked with side rails adjusted as appropriate  - Keep care items and personal belongings within reach  - Initiate and maintain comfort rounds  - Make Fall Risk Sign visible to staff  - Apply yellow socks and bracelet for high fall risk patients  - Consider moving patient to room near nurses station  Outcome: Progressing  Goal: Maintain or return to baseline ADL function  Description: INTERVENTIONS:  -  Assess patient's ability to carry out ADLs; assess patient's baseline for ADL function and identify physical deficits which impact ability to perform ADLs (bathing, care of mouth/teeth, toileting, grooming, dressing, etc.)  - Assess/evaluate cause of self-care deficits   - Assess range of motion  - Assess patient's mobility; develop plan if impaired  - Assess patient's need for assistive devices and provide as appropriate  - Encourage maximum independence but intervene and supervise when necessary  - Involve family in performance of ADLs  - Assess for home care needs following discharge   - Consider OT consult to assist with ADL evaluation and planning for discharge  - Provide patient education as appropriate  - Monitor functional capacity and physical performance, use of AM PAC & JH-HLM   - Monitor gait, balance and fatigue with ambulation    Outcome: Progressing  Goal: Maintains/Returns to pre admission functional level  Description: INTERVENTIONS:  - Perform AM-PAC 6 Click Basic Mobility/ Daily Activity assessment daily.  - Set and communicate daily mobility goal to care team and patient/family/caregiver.   - Collaborate with rehabilitation services on mobility goals if consulted  - Out of bed for toileting  - Record patient progress and toleration of activity level   Outcome: Progressing     Problem: DISCHARGE  PLANNING  Goal: Discharge to home or other facility with appropriate resources  Description: INTERVENTIONS:  - Identify barriers to discharge w/patient and caregiver  - Arrange for needed discharge resources and transportation as appropriate  - Identify discharge learning needs (meds, wound care, etc.)  - Arrange for interpretive services to assist at discharge as needed  - Refer to Case Management Department for coordinating discharge planning if the patient needs post-hospital services based on physician/advanced practitioner order or complex needs related to functional status, cognitive ability, or social support system  Outcome: Progressing     Problem: Knowledge Deficit  Goal: Patient/family/caregiver demonstrates understanding of disease process, treatment plan, medications, and discharge instructions  Description: Complete learning assessment and assess knowledge base.  Interventions:  - Provide teaching at level of understanding  - Provide teaching via preferred learning methods  Outcome: Progressing     Problem: POSTPARTUM  Goal: Experiences normal postpartum course  Description: INTERVENTIONS:  - Monitor maternal vital signs  - Assess uterine involution and lochia  Outcome: Progressing  Goal: Appropriate maternal -  bonding  Description: INTERVENTIONS:  - Identify family support  - Assess for appropriate maternal/infant bonding   -Encourage maternal/infant bonding opportunities  - Referral to  or  as needed  Outcome: Progressing  Goal: Establishment of infant feeding pattern  Description: INTERVENTIONS:  - Assess breast/bottle feeding  - Refer to lactation as needed  Outcome: Progressing  Goal: Incision(s), wounds(s) or drain site(s) healing without S/S of infection  Description: INTERVENTIONS  - Assess and document dressing, incision, wound bed, drain sites and surrounding tissue  - Provide patient and family education    Outcome: Progressing

## 2025-05-22 NOTE — ASSESSMENT & PLAN NOTE
- routine postoperative care  - ambulation and SCDs for VTE ppx  - Desires Dc home, instructions reviewed

## 2025-05-22 NOTE — PROGRESS NOTES
Progress Note - OB/GYN   Name: Erika Coburn 39 y.o. female I MRN: [id number]  Unit/Bed#: -01 I Date of Admission: 2025   Date of Service: 2025 I Hospital Day: 3    Assessment & Plan   delivery delivered  - routine postoperative care  - ambulation and SCDs for VTE ppx  - Desires Dc home, instructions reviewed   Postoperative anemia due to acute blood loss  - uncomplicated repeat  delivery with  cc  - large 16 cm rectus sheath hematoma noted  - patient is now status post 2 units RBC transfusion, alleviation of clinical symptoms  - Hg 12.6 > 8.0 > 6.7 > + 2 units RBCs > 7.4 > 7.2 > 9.2 this AM.   Rh negative state in antepartum period  - s/p Rhogam 25 given  is Rh positive  Gestational hypertension, antepartum  - started Procardia XL 30 mg on  (2 pm)  - labs wnl    OB Post-Partum Progress Note  Subjective   Post delivery. Patient is doing well. Lochia WNL. Pain well controlled. She reports feeling well. Denies any dizziness and feeling much better than yesterday.     Pain: yes, cramping, improved with meds  Tolerating PO: yes  Voiding: +  Flatus: +  BM: +  Ambulating: yes  Breastfeeding:  +  Chest pain: no  Shortness of breath: no  Leg pain: no  Lochia: WNL    Objective :  Temp:  [97.7 °F (36.5 °C)-98.8 °F (37.1 °C)] 97.7 °F (36.5 °C)  HR:  [81-96] 87  BP: (134-177)/() 134/78  Resp:  [17-18] 18  SpO2:  [97 %-100 %] 100 %  O2 Device: None (Room air)    Physical Exam  Constitutional:       Appearance: Normal appearance.   HENT:      Head: Normocephalic.   Pulmonary:      Effort: Pulmonary effort is normal.   Abdominal:      General: There is no distension.      Palpations: Abdomen is soft.      Comments: Incision c/d/i     Musculoskeletal:         General: Normal range of motion.     Skin:     General: Skin is warm and dry.     Neurological:      Mental Status: She is alert and oriented to person, place, and time.     Psychiatric:         Mood and  Affect: Mood normal.         Behavior: Behavior normal.           Lab Results: I have reviewed the following results:  Lab Results   Component Value Date    WBC 11.25 (H) 05/22/2025    HGB 9.2 (L) 05/22/2025    HCT 28.2 (L) 05/22/2025    MCV 95 05/22/2025     05/22/2025

## 2025-05-22 NOTE — DISCHARGE SUMMARY
Discharge Summary - OB/GYN   Name: Erika Coburn 39 y.o. female I MRN: [id number]  Unit/Bed#: -01 I Date of Admission: 2025   Date of Service: 2025 I Hospital Day: 3    ADMISSION  Patient of: [facility] [place]  Admission Date: 2025   Admitting Attending: Dr. Raf Tse MD  Admitting Diagnoses: Problem List[1]    DELIVERY  Delivery Method: , Low Transverse   Delivery Date and Time: 2025 11:13 AM  Delivery Attending: Raf Tse    DISCHARGE  Discharge Date: 25  Discharge Attending: Dr. Raf Tse MD  Discharge Diagnosis:   Same, Delivered  Rectus sheath hematoma  Clinical course: Admission to Delivery  Erika Coburn is a 39 y.o.  who was admitted at 39w1d for scheduled repeat c section.      Delivery  Route of Delivery: , Low Transverse  Reason for  delivery: Prior  1    Anesthesia: Spinal,   QBL:        QBL:        Delivery: , Low Transverse at 2025 11:13 AM    Baby's Weight: 2750 g (6 lb 1 oz); 97    Apgar scores: 8  and 9  at 1 and 5 minutes, respectively    Clinical Course: Post-Delivery:  The post delivery course was remarkable. On POD#1 patient reported feeling dizzy. Imaging was ordered which showed    Large 16 cm hematoma in the pelvis, with no definite evidence of active hemorrhage.     Extensive rectus sheath hematoma in the pelvic wall, with no definite evidence of active hemorrhage.    She was transfused 3u PRBC. Hb remained stable. She was also noted have elevated Bps and started on procardia as well.     On the day of discharge, the patient was ambulating, voiding spontaneously, tolerating oral intake, and hemodynamically stable. She was able to reasonably perform all ADLs. She had appropriate bowel function. Pain was well-controlled. She was discharged home on postpartum/postop day #3 without complications. Patient was instructed to follow up with her OB as an outpatient and  was given appropriate warnings to call her provider with problems or concerns.    Pertinent lab findings included:   Blood type O-.     Last three Hgb values:  Lab Results   Component Value Date    HGB 9.2 (L) 2025    HGB 7.9 (L) 2025    HGB 7.2 (L) 2025        Problem-specific follow-up plans included the following:  Assessment & Plan   delivery delivered  - routine postoperative care  - ambulation and SCDs for VTE ppx  - Desires Dc home, instructions reviewed   Postoperative anemia due to acute blood loss  - uncomplicated repeat  delivery with  cc  - large 16 cm rectus sheath hematoma noted  - patient is now status post 2 units RBC transfusion, alleviation of clinical symptoms  - Hg 12.6 > 8.0 > 6.7 > + 2 units RBCs > 7.4 > 7.2 > 9.2 this AM.   Rh negative state in antepartum period  - s/p Rhogam 25 given  is Rh positive  Gestational hypertension, antepartum  - started Procardia XL 30 mg on  (2 pm)  - labs wnl      Discharge med list:  Contraception: TBD in office     Medication List      START taking these medications     acetaminophen 325 mg tablet; Commonly known as: TYLENOL; Take 2 tablets   (650 mg total) by mouth every 6 (six) hours for 1 dose   diphenhydrAMINE 25 mg tablet; Commonly known as: BENADRYL; Take 1 tablet   (25 mg total) by mouth every 6 (six) hours as needed for itching or sleep   ibuprofen 600 mg tablet; Commonly known as: MOTRIN; Take 1 tablet (600   mg total) by mouth every 6 (six) hours as needed for mild pain   NIFEdipine ER 60 MG 24 hr tablet; Commonly known as: ADALAT CC; Take 1   tablet (60 mg total) by mouth daily; Start taking on: May 23, 2025     CONTINUE taking these medications     CALCIUM 500 PO   PRENATAL 1 PO       Condition at discharge:   good     Disposition:   Home    Planned Readmission:   No    Discharge Statement:  I have spent a total time of 20 minutes in caring for this patient on the day of the visit/encounter.  .       [1]   Patient Active Problem List  Diagnosis    Pregnancy resulting from in vitro fertilization    39 weeks gestation of pregnancy    Rh negative state in antepartum period    Elevated blood pressure reading    Gestational hypertension, antepartum    Maternal age > 35, multigravida    Current pregnancy in third trimester with history of placenta previa during prior pregnancy    Maternal care due to low transverse uterine scar from previous  delivery    History of gestational hypertension    Vanishing twin syndrome    Placental abnormality in third trimester    Postoperative anemia due to acute blood loss     delivery delivered

## 2025-05-23 VITALS
TEMPERATURE: 97 F | DIASTOLIC BLOOD PRESSURE: 77 MMHG | HEART RATE: 91 BPM | WEIGHT: 206 LBS | HEIGHT: 72 IN | BODY MASS INDEX: 27.9 KG/M2 | SYSTOLIC BLOOD PRESSURE: 138 MMHG | RESPIRATION RATE: 18 BRPM | OXYGEN SATURATION: 98 %

## 2025-05-23 RX ORDER — BUTALBITAL, ACETAMINOPHEN AND CAFFEINE 50; 325; 40 MG/1; MG/1; MG/1
1 TABLET ORAL EVERY 4 HOURS PRN
Status: DISCONTINUED | OUTPATIENT
Start: 2025-05-23 | End: 2025-05-23 | Stop reason: HOSPADM

## 2025-05-23 RX ORDER — BUTALBITAL, ACETAMINOPHEN AND CAFFEINE 50; 325; 40 MG/1; MG/1; MG/1
1 TABLET ORAL EVERY 6 HOURS PRN
Qty: 15 TABLET | Refills: 0 | Status: SHIPPED | OUTPATIENT
Start: 2025-05-23 | End: 2025-06-02

## 2025-05-23 RX ORDER — LABETALOL 200 MG/1
200 TABLET, FILM COATED ORAL EVERY 12 HOURS SCHEDULED
Qty: 60 TABLET | Refills: 0 | Status: SHIPPED | OUTPATIENT
Start: 2025-05-23

## 2025-05-23 RX ORDER — ACETAMINOPHEN 325 MG/1
650 TABLET ORAL EVERY 6 HOURS PRN
Start: 2025-05-23

## 2025-05-23 RX ADMIN — ACETAMINOPHEN 650 MG: 325 TABLET, FILM COATED ORAL at 09:09

## 2025-05-23 RX ADMIN — IBUPROFEN 600 MG: 600 TABLET ORAL at 02:59

## 2025-05-23 RX ADMIN — ACETAMINOPHEN 650 MG: 325 TABLET, FILM COATED ORAL at 02:59

## 2025-05-23 RX ADMIN — LABETALOL HYDROCHLORIDE 200 MG: 200 TABLET, FILM COATED ORAL at 09:10

## 2025-05-23 RX ADMIN — BUTALBITAL, ACETAMINOPHEN, AND CAFFEINE 1 TABLET: 325; 50; 40 TABLET ORAL at 12:00

## 2025-05-23 RX ADMIN — NIFEDIPINE 60 MG: 30 TABLET, FILM COATED, EXTENDED RELEASE ORAL at 09:11

## 2025-05-23 RX ADMIN — IBUPROFEN 600 MG: 600 TABLET ORAL at 09:08

## 2025-05-23 NOTE — PLAN OF CARE
Problem: PAIN - ADULT  Goal: Verbalizes/displays adequate comfort level or baseline comfort level  Description: Interventions:  - Encourage patient to monitor pain and request assistance  - Assess pain using appropriate pain scale  - Administer analgesics as ordered based on type and severity of pain and evaluate response  - Implement non-pharmacological measures as appropriate and evaluate response  - Consider cultural and social influences on pain and pain management  - Notify physician/advanced practitioner if interventions unsuccessful or patient reports new pain  - Educate patient/family on pain management process including their role and importance of  reporting pain   - Provide non-pharmacologic/complimentary pain relief interventions  Outcome: Progressing     Problem: INFECTION - ADULT  Goal: Absence or prevention of progression during hospitalization  Description: INTERVENTIONS:  - Assess and monitor for signs and symptoms of infection  - Monitor lab/diagnostic results  - Monitor all insertion sites, i.e. indwelling lines, tubes, and drains  - Monitor endotracheal if appropriate and nasal secretions for changes in amount and color  - [place] appropriate cooling/warming therapies per order  - Administer medications as ordered  - Instruct and encourage patient and family to use good hand hygiene technique  - Identify and instruct in appropriate isolation precautions for identified infection/condition  Outcome: Progressing  Goal: Absence of fever/infection during neutropenic period  Description: INTERVENTIONS:  - Monitor WBC  - Perform strict hand hygiene  - Limit to healthy visitors only  - No plants, dried, fresh or silk flowers with rivera in patient room  Outcome: Progressing     Problem: SAFETY ADULT  Goal: Patient will remain free of falls  Description: INTERVENTIONS:  - Educate patient/family on patient safety including physical limitations  - Instruct patient to call for assistance with activity   -  Consider consulting OT/PT to assist with strengthening/mobility based on AM PAC & JH-HLM score  - Consult OT/PT to assist with strengthening/mobility   - Keep Call bell within reach  - Keep bed low and locked with side rails adjusted as appropriate  - Keep care items and personal belongings within reach  - Initiate and maintain comfort rounds  - Make Fall Risk Sign visible to staff    - Apply yellow socks and bracelet for high fall risk patients  - Consider moving patient to room near nurses station  Outcome: Progressing  Goal: Maintain or return to baseline ADL function  Description: INTERVENTIONS:  -  Assess patient's ability to carry out ADLs; assess patient's baseline for ADL function and identify physical deficits which impact ability to perform ADLs (bathing, care of mouth/teeth, toileting, grooming, dressing, etc.)  - Assess/evaluate cause of self-care deficits   - Assess range of motion  - Assess patient's mobility; develop plan if impaired  - Assess patient's need for assistive devices and provide as appropriate  - Encourage maximum independence but intervene and supervise when necessary  - Involve family in performance of ADLs  - Assess for home care needs following discharge   - Consider OT consult to assist with ADL evaluation and planning for discharge  - Provide patient education as appropriate  - Monitor functional capacity and physical performance, use of AM PAC & JH-HLM   - Monitor gait, balance and fatigue with ambulation    Outcome: Progressing  Goal: Maintains/Returns to pre admission functional level  Description: INTERVENTIONS:  - Perform AM-PAC 6 Click Basic Mobility/ Daily Activity assessment daily.  - Set and communicate daily mobility goal to care team and patient/family/caregiver.   - Collaborate with rehabilitation services on mobility goals if consulted    - Out of bed for toileting  - Record patient progress and toleration of activity level   Outcome: Progressing     Problem: DISCHARGE  PLANNING  Goal: Discharge to home or other facility with appropriate resources  Description: INTERVENTIONS:  - Identify barriers to discharge w/patient and caregiver  - Arrange for needed discharge resources and transportation as appropriate  - Identify discharge learning needs (meds, wound care, etc.)  - Arrange for interpretive services to assist at discharge as needed  - Refer to Case Management Department for coordinating discharge planning if the patient needs post-hospital services based on physician/advanced practitioner order or complex needs related to functional status, cognitive ability, or social support system  Outcome: Progressing

## 2025-05-23 NOTE — PLAN OF CARE
Problem: PAIN - ADULT  Goal: Verbalizes/displays adequate comfort level or baseline comfort level  Description: Interventions:  - Encourage patient to monitor pain and request assistance  - Assess pain using appropriate pain scale  - Administer analgesics as ordered based on type and severity of pain and evaluate response  - Implement non-pharmacological measures as appropriate and evaluate response  - Consider cultural and social influences on pain and pain management  - Notify physician/advanced practitioner if interventions unsuccessful or patient reports new pain  - Educate patient/family on pain management process including their role and importance of  reporting pain   - Provide non-pharmacologic/complimentary pain relief interventions  Outcome: Progressing     Problem: INFECTION - ADULT  Goal: Absence or prevention of progression during hospitalization  Description: INTERVENTIONS:  - Assess and monitor for signs and symptoms of infection  - Monitor lab/diagnostic results  - Monitor all insertion sites, i.e. indwelling lines, tubes, and drains  - Monitor endotracheal if appropriate and nasal secretions for changes in amount and color  - [place] appropriate cooling/warming therapies per order  - Administer medications as ordered  - Instruct and encourage patient and family to use good hand hygiene technique  - Identify and instruct in appropriate isolation precautions for identified infection/condition  Outcome: Progressing  Goal: Absence of fever/infection during neutropenic period  Description: INTERVENTIONS:  - Monitor WBC  - Perform strict hand hygiene  - Limit to healthy visitors only  - No plants, dried, fresh or silk flowers with rivera in patient room  Outcome: Progressing     Problem: SAFETY ADULT  Goal: Patient will remain free of falls  Description: INTERVENTIONS:  - Educate patient/family on patient safety including physical limitations  - Instruct patient to call for assistance with activity   -  Consider consulting OT/PT to assist with strengthening/mobility based on AM PAC & JH-HLM score  - Consult OT/PT to assist with strengthening/mobility   - Keep Call bell within reach  - Keep bed low and locked with side rails adjusted as appropriate  - Keep care items and personal belongings within reach  - Initiate and maintain comfort rounds  - Make Fall Risk Sign visible to staff  - Apply yellow socks and bracelet for high fall risk patients  - Consider moving patient to room near nurses station  Outcome: Progressing  Goal: Maintain or return to baseline ADL function  Description: INTERVENTIONS:  -  Assess patient's ability to carry out ADLs; assess patient's baseline for ADL function and identify physical deficits which impact ability to perform ADLs (bathing, care of mouth/teeth, toileting, grooming, dressing, etc.)  - Assess/evaluate cause of self-care deficits   - Assess range of motion  - Assess patient's mobility; develop plan if impaired  - Assess patient's need for assistive devices and provide as appropriate  - Encourage maximum independence but intervene and supervise when necessary  - Involve family in performance of ADLs  - Assess for home care needs following discharge   - Consider OT consult to assist with ADL evaluation and planning for discharge  - Provide patient education as appropriate  - Monitor functional capacity and physical performance, use of AM PAC & JH-HLM   - Monitor gait, balance and fatigue with ambulation    Outcome: Progressing  Goal: Maintains/Returns to pre admission functional level  Description: INTERVENTIONS:  - Perform AM-PAC 6 Click Basic Mobility/ Daily Activity assessment daily.  - Set and communicate daily mobility goal to care team and patient/family/caregiver.   - Collaborate with rehabilitation services on mobility goals if consulted  - Record patient progress and toleration of activity level   Outcome: Progressing     Problem: DISCHARGE PLANNING  Goal: Discharge to  home or other facility with appropriate resources  Description: INTERVENTIONS:  - Identify barriers to discharge w/patient and caregiver  - Arrange for needed discharge resources and transportation as appropriate  - Identify discharge learning needs (meds, wound care, etc.)  - Arrange for interpretive services to assist at discharge as needed  - Refer to Case Management Department for coordinating discharge planning if the patient needs post-hospital services based on physician/advanced practitioner order or complex needs related to functional status, cognitive ability, or social support system  Outcome: Progressing     Problem: Knowledge Deficit  Goal: Patient/family/caregiver demonstrates understanding of disease process, treatment plan, medications, and discharge instructions  Description: Complete learning assessment and assess knowledge base.  Interventions:  - Provide teaching at level of understanding  - Provide teaching via preferred learning methods  Outcome: Progressing     Problem: POSTPARTUM  Goal: Experiences normal postpartum course  Description: INTERVENTIONS:  - Monitor maternal vital signs  - Assess uterine involution and lochia  Outcome: Progressing  Goal: Appropriate maternal -  bonding  Description: INTERVENTIONS:  - Identify family support  - Assess for appropriate maternal/infant bonding   -Encourage maternal/infant bonding opportunities  - Referral to  or  as needed  Outcome: Progressing  Goal: Establishment of infant feeding pattern  Description: INTERVENTIONS:  - Assess breast/bottle feeding  - Refer to lactation as needed  Outcome: Progressing  Goal: Incision(s), wounds(s) or drain site(s) healing without S/S of infection  Description: INTERVENTIONS  - Assess and document dressing, incision, wound bed, drain sites and surrounding tissue  - Provide patient and family education  Outcome: Progressing

## 2025-05-23 NOTE — DISCHARGE SUMMARY
Discharge Summary - OB/GYN   Name: Erika Coburn 39 y.o. female I MRN: [id number]  Unit/Bed#: -01 I Date of Admission: 2025   Date of Service: 2025 I Hospital Day: 4  { ?Quick Links I Problem List I PORCH I Billing Tip:17959}  ADMISSION  Patient of: [facility] [place]  Admission Date: 2025   Admitting Attending: Dr. Raf Tse MD  Admitting Diagnoses: Problem List[1]    DELIVERY  Delivery Method: , Low Transverse   Delivery Date and Time: 2025 11:13 AM  Delivery Attending: Raf Tse MD    DISCHARGE  Discharge Date: 2025  Discharge Attending: Dr. MICHOACANO Yoo  Discharge Diagnosis:   Same, Delivered      Clinical course: Admission to Delivery  Erika Coburn is a 39 y.o.  who was admitted at 39w1d for ***.    ***Reason for induction:  .     ***Pt was in spontaneous labor and managed expectantly.    ***Induction method:  , ,   .     For pain control in labor, pt received ***.  ***The labor course was complicated by ***.  She progressed to complete and began pushing.    Delivery  Route of Delivery: , Low Transverse  ***Reason for  delivery: Prior  1    Anesthesia: Spinal,   QBL:        QBL:        Delivery: , Low Transverse at 2025 11:13 AM  ***Laceration: Perineal:   Repaired?      Baby's Weight: 2750 g (6 lb 1 oz); 97    Apgar scores: 8  and 9  at 1 and 5 minutes, respectively    Clinical Course: Post-Delivery:  The post delivery course was {remarkable/unremarkable:36666}.    On the day of discharge, the patient was ambulating, voiding spontaneously, tolerating oral intake, and hemodynamically stable. She was able to reasonably perform all ADLs. She had appropriate bowel function. Pain was well-controlled. She was discharged home on postpartum/postop day #*** without complications***. Patient was instructed to follow up with her OB as an outpatient and was given appropriate warnings to  call her provider with problems or concerns.    Pertinent lab findings included:   Blood type {AYLEEN BLOOD TYPE:54778}.     Last three Hgb values:  Lab Results   Component Value Date    HGB 9.2 (L) 05/22/2025    HGB 7.9 (L) 05/21/2025    HGB 7.2 (L) 05/21/2025        Problem-specific follow-up plans included the following:  {Insert PORCH/Hospital Course prior to signing note:62541}    Discharge med list:  Contraception: ***     Medication List      START taking these medications     * acetaminophen 325 mg tablet; Commonly known as: TYLENOL; Take 2   tablets (650 mg total) by mouth every 6 (six) hours for 1 dose   * acetaminophen 325 mg tablet; Commonly known as: TYLENOL; Take 2   tablets (650 mg total) by mouth every 6 (six) hours as needed for mild   pain (If pain not adequately controlled with motrin only)   butalbital-acetaminophen-caffeine -40 mg per tablet; Commonly   known as: FIORICET,ESGIC; Take 1 tablet by mouth every 6 (six) hours as   needed for headaches for up to 10 days   diphenhydrAMINE 25 mg tablet; Commonly known as: BENADRYL; Take 1 tablet   (25 mg total) by mouth every 6 (six) hours as needed for itching or sleep   ibuprofen 600 mg tablet; Commonly known as: MOTRIN; Take 1 tablet (600   mg total) by mouth every 6 (six) hours as needed for mild pain   labetalol 200 mg tablet; Commonly known as: NORMODYNE; Take 1 tablet   (200 mg total) by mouth every 12 (twelve) hours   NIFEdipine ER 60 MG 24 hr tablet; Commonly known as: ADALAT CC; Take 1   tablet (60 mg total) by mouth daily  * This list has 2 medication(s) that are the same as other medications   prescribed for you. Read the directions carefully, and ask your doctor or   other care provider to review them with you.     CONTINUE taking these medications     CALCIUM 500 PO   PRENATAL 1 PO       Condition at discharge:   {condition:35377}     Disposition:   {Discharge Disposition:22158}    Planned Readmission:   {EXAM;YES/NO:67598}    Discharge  Statement:  I have spent a total time of *** minutes in caring for this patient on the day of the visit/encounter. {>30 minutes of time was spent on:27083}.       [1]   Patient Active Problem List  Diagnosis    Pregnancy resulting from in vitro fertilization    39 weeks gestation of pregnancy    Rh negative state in antepartum period    Elevated blood pressure reading    Gestational hypertension, antepartum    Maternal age > 35, multigravida    Current pregnancy in third trimester with history of placenta previa during prior pregnancy    Maternal care due to low transverse uterine scar from previous  delivery    History of gestational hypertension    Vanishing twin syndrome    Placental abnormality in third trimester    Postoperative anemia due to acute blood loss     delivery delivered

## 2025-05-23 NOTE — PLAN OF CARE
Problem: PAIN - ADULT  Goal: Verbalizes/displays adequate comfort level or baseline comfort level  Description: Interventions:  - Encourage patient to monitor pain and request assistance  - Assess pain using appropriate pain scale  - Administer analgesics as ordered based on type and severity of pain and evaluate response  - Implement non-pharmacological measures as appropriate and evaluate response  - Consider cultural and social influences on pain and pain management  - Notify physician/advanced practitioner if interventions unsuccessful or patient reports new pain  - Educate patient/family on pain management process including their role and importance of  reporting pain   - Provide non-pharmacologic/complimentary pain relief interventions  5/23/2025 0608 by Chantelle Rivero RN  Outcome: Progressing  5/22/2025 2315 by Chantelle Rivero RN  Outcome: Progressing     Problem: INFECTION - ADULT  Goal: Absence or prevention of progression during hospitalization  Description: INTERVENTIONS:  - Assess and monitor for signs and symptoms of infection  - Monitor lab/diagnostic results  - Monitor all insertion sites, i.e. indwelling lines, tubes, and drains  - Monitor endotracheal if appropriate and nasal secretions for changes in amount and color  - [place] appropriate cooling/warming therapies per order  - Administer medications as ordered  - Instruct and encourage patient and family to use good hand hygiene technique  - Identify and instruct in appropriate isolation precautions for identified infection/condition  5/23/2025 0608 by Chantelle Rivero RN  Outcome: Progressing  5/22/2025 2315 by Chantelle Rivero RN  Outcome: Progressing  Goal: Absence of fever/infection during neutropenic period  Description: INTERVENTIONS:  - Monitor WBC  - Perform strict hand hygiene  - Limit to healthy visitors only  - No plants, dried, fresh or silk flowers with rivera in patient room  5/23/2025 0608 by Chantelle Rivero RN  Outcome: Progressing  5/22/2025  2315 by Chantelle Rivero RN  Outcome: Progressing     Problem: SAFETY ADULT  Goal: Patient will remain free of falls  Description: INTERVENTIONS:  - Educate patient/family on patient safety including physical limitations  - Instruct patient to call for assistance with activity   - Consider consulting OT/PT to assist with strengthening/mobility based on AM PAC & JH-HLM score  - Consult OT/PT to assist with strengthening/mobility   - Keep Call bell within reach  - Keep bed low and locked with side rails adjusted as appropriate  - Keep care items and personal belongings within reach  - Initiate and maintain comfort rounds  - Make Fall Risk Sign visible to staff    - Apply yellow socks and bracelet for high fall risk patients  - Consider moving patient to room near nurses station  5/23/2025 0608 by Chantelle Rivero RN  Outcome: Progressing  5/22/2025 2315 by Chantelle Rivero RN  Outcome: Progressing  Goal: Maintain or return to baseline ADL function  Description: INTERVENTIONS:  -  Assess patient's ability to carry out ADLs; assess patient's baseline for ADL function and identify physical deficits which impact ability to perform ADLs (bathing, care of mouth/teeth, toileting, grooming, dressing, etc.)  - Assess/evaluate cause of self-care deficits   - Assess range of motion  - Assess patient's mobility; develop plan if impaired  - Assess patient's need for assistive devices and provide as appropriate  - Encourage maximum independence but intervene and supervise when necessary  - Involve family in performance of ADLs  - Assess for home care needs following discharge   - Consider OT consult to assist with ADL evaluation and planning for discharge  - Provide patient education as appropriate  - Monitor functional capacity and physical performance, use of AM PAC & JH-HLM   - Monitor gait, balance and fatigue with ambulation    5/23/2025 0608 by Chantelle Rivero RN  Outcome: Progressing  5/22/2025 2315 by Chantelle Rivero RN  Outcome:  Progressing  Goal: Maintains/Returns to pre admission functional level  Description: INTERVENTIONS:  - Perform AM-PAC 6 Click Basic Mobility/ Daily Activity assessment daily.  - Set and communicate daily mobility goal to care team and patient/family/caregiver.   - Collaborate with rehabilitation services on mobility goals if consulted    - Out of bed for toileting  - Record patient progress and toleration of activity level   5/23/2025 0608 by Chantelle Rivero RN  Outcome: Progressing  5/22/2025 2315 by Chantelle Rivero RN  Outcome: Progressing

## 2025-05-25 ENCOUNTER — NURSE TRIAGE (OUTPATIENT)
Dept: OTHER | Facility: OTHER | Age: 40
End: 2025-05-25

## 2025-05-25 ENCOUNTER — HOSPITAL ENCOUNTER (OUTPATIENT)
Facility: HOSPITAL | Age: 40
Discharge: HOME | End: 2025-05-25
Attending: OBSTETRICS & GYNECOLOGY | Admitting: OBSTETRICS & GYNECOLOGY
Payer: COMMERCIAL

## 2025-05-25 VITALS
SYSTOLIC BLOOD PRESSURE: 129 MMHG | TEMPERATURE: 97.5 F | OXYGEN SATURATION: 98 % | HEART RATE: 100 BPM | DIASTOLIC BLOOD PRESSURE: 64 MMHG | RESPIRATION RATE: 17 BRPM

## 2025-05-25 PROCEDURE — G0463 HOSPITAL OUTPT CLINIC VISIT: HCPCS

## 2025-05-25 PROCEDURE — 99202 OFFICE O/P NEW SF 15 MIN: CPT

## 2025-05-25 PROCEDURE — 99024 POSTOP FOLLOW-UP VISIT: CPT | Performed by: OBSTETRICS & GYNECOLOGY

## 2025-05-25 NOTE — TELEPHONE ENCOUNTER
"Regarding: s/p  Mon / partial incision opened  ----- Message from Doreen STATON sent at 2025  3:31 PM EDT -----  \"I had a  on Monday and the incision opened a little and is bleeding\"    "

## 2025-05-25 NOTE — H&P
"History & Physical - OB/GYN   Tanya Coburn 39 y.o. female MRN: [id number]  Unit/Bed#: LD TRIAGE  Encounter: [id number]    39 y.o.  s/p R LTCS 2025    She is a patient of [facility]    Chief complaint:  \" I noticed blood leaking from my incision today\"    HPI: Patient is a 40 yo  who underwent an uncomplicated repeat LTCS 2025.  Postoperative course complicated by rectus sheath hematoma measuring 3 x 14 x 13 cm.  Transfused 4 units of pRBC's.  She noted some bright red-dark bleeding oozing from her incision starting today.  No fevers or increase in pain.    Pregnancy Complications:  Problem List[1]      PMH:  Past Medical History[2]    PSH:  Past Surgical History[3]    Social Hx:  Social History[4]      OB Hx:  OB History    Para Term  AB Living   2 2 2 0 0 2   SAB IAB Ectopic Multiple Live Births   0 0 0 0 2      # Outcome Date GA Lbr Beny/2nd Weight Sex Type Anes PTL Lv   2 Term 25 39w1d  2750 g (6 lb 1 oz) M CS-LTranv Spinal  TAMIKO      Name: Vernon Coburn      Apgar1: 8  Apgar5: 9   1 Term 23 37w0d  2700 g (5 lb 15.2 oz) M CS-LTranv Spinal  TAMIKO      Birth Comments: Neonatologist present in OR at birth      Name: PAMELA FLOWER,BABY BOY (TANYA)      Apgar1: 9  Apgar5: 9       Meds:  Medications Ordered Prior to Encounter[5]    Allergies:  Allergies[6]    ROS:  pertinent findings in HPI    Physical Exam:  /64 (BP Location: Right arm)   Pulse 100   Temp 97.5 °F (36.4 °C) (Temporal)   Resp 17   SpO2 98%     Physical Exam  Constitutional:       Appearance: Normal appearance.   HENT:      Head: Normocephalic.     Cardiovascular:      Rate and Rhythm: Normal rate and regular rhythm.   Pulmonary:      Effort: Pulmonary effort is normal.   Abdominal:      General: There is no distension.      Palpations: Abdomen is soft.      Tenderness: There is no abdominal tenderness.      Comments: Wound - steristrips removed.  2cm separation noted on right " portion of wound.  Old dark blood without odor or purulence noted.  Wound cleaned.  Healthy viable tissue.  Fascia probed with sterile swab - intact.  Wound packed and Mepilex dressing placed.     Musculoskeletal:         General: No swelling.     Neurological:      General: No focal deficit present.      Mental Status: She is alert and oriented to person, place, and time.     Skin:     General: Skin is warm and dry.      Findings: Bruising present.     Psychiatric:         Mood and Affect: Mood normal.         Behavior: Behavior normal.   Vitals reviewed.         Assessment:   39 y.o.  POD # 6 repeat LTCS complicated by hematoma of rectus sheath now with wound separation and evacuation of hematoma    Plan:     Leave dressing on  Wound precautions reviewed  Follow up in office as espinoza             [1]   Patient Active Problem List  Diagnosis    Pregnancy resulting from in vitro fertilization    39 weeks gestation of pregnancy    Rh negative state in antepartum period    Elevated blood pressure reading    Gestational hypertension, antepartum    Maternal age > 35, multigravida    Current pregnancy in third trimester with history of placenta previa during prior pregnancy    Maternal care due to low transverse uterine scar from previous  delivery    History of gestational hypertension    Vanishing twin syndrome    Placental abnormality in third trimester    Postoperative anemia due to acute blood loss     delivery delivered   [2]   Past Medical History:  Diagnosis Date    Asthma     exercise induced childhood - no inhaler use    Female infertility     Had IVF to conceive.    Hypertension     elevated ELVA day of delivery    Varicella     pt had chickenpox in childhood   [3]   Past Surgical History:  Procedure Laterality Date    ND  DELIVERY ONLY N/A 3/14/2023    Procedure:  SECTION ();  Surgeon: Gregory Moore MD;  [facility];  Service: Obstetrics    ND   DELIVERY ONLY N/A 2025    Procedure:  SECTION ();  Surgeon: Raf Tse MD;  Location: [facility];  Service: Obstetrics    WISDOM TOOTH EXTRACTION      7TH GRADE   [4]   Social History  Tobacco Use    Smoking status: Former     Current packs/day: 0.00     Types: Cigarettes     Quit date: 2014     Years since quitting: 10.9     Passive exposure: Never    Smokeless tobacco: Never   Vaping Use    Vaping status: Never Used   Substance Use Topics    Alcohol use: Not Currently     Alcohol/week: 2.0 standard drinks of alcohol    Drug use: Never   [5]   No current facility-administered medications on file prior to encounter.     Current Outpatient Medications on File Prior to Encounter   Medication Sig Dispense Refill    acetaminophen (TYLENOL) 325 mg tablet Take 2 tablets (650 mg total) by mouth every 6 (six) hours as needed for mild pain (If pain not adequately controlled with motrin only)      butalbital-acetaminophen-caffeine (FIORICET,ESGIC) -40 mg per tablet Take 1 tablet by mouth every 6 (six) hours as needed for headaches for up to 10 days 15 tablet 0    Calcium Carbonate (CALCIUM 500 PO) Take by mouth 1000mg daily      diphenhydrAMINE (BENADRYL) 25 mg tablet Take 1 tablet (25 mg total) by mouth every 6 (six) hours as needed for itching or sleep      ibuprofen (MOTRIN) 600 mg tablet Take 1 tablet (600 mg total) by mouth every 6 (six) hours as needed for mild pain      labetalol (NORMODYNE) 200 mg tablet Take 1 tablet (200 mg total) by mouth every 12 (twelve) hours 60 tablet 0    NIFEdipine (ADALAT CC) 60 MG 24 hr tablet Take 1 tablet (60 mg total) by mouth daily 30 tablet 1    Prenatal MV-Min-Fe Fum-FA-DHA (PRENATAL 1 PO) Take by mouth     [6] No Known Allergies

## 2025-05-25 NOTE — TELEPHONE ENCOUNTER
"FOLLOW UP: None at this time    REASON FOR CONVERSATION: Post-op Problem    SYMPTOMS: Bleeding from  scar     OTHER: On call provider made aware and advised to present to L&D triage for evaluation. Patient made aware and is agreeable.     DISPOSITION: Go to LD Now (overriding Go to ED Now)        Reason for Disposition   [1] Bleeding from incision AND [2] won't stop after 10 minutes of direct pressure    Answer Assessment - Initial Assessment Questions  1. SYMPTOM: \"What's the main symptom you're concerned about?\" (e.g., drainage, incision opened up, pain, redness)        Right side of incision opened 1 inch from the edge opened up, small opening that is bleeding. Covered with no sticky strip from the hospital, not filling up a full pad, not even a quarter of an ounce, dripping blood. Rinku made it stop     2. ONSET: \"When did bleeding  start?\"        Yesterday started a little bit stopped started again today     3. SURGERY: \"What surgery did you have?\"             4. DATE of SURGERY: \"When was the surgery?\"         25    5. INCISION SITE: \"Where is the incision located?\"              6. REDNESS: \"Is there any redness at the incision site?\" If Yes, ask: \"How wide across is the redness?\" (Inches, centimeters)         Some swelling but no signs of infection, denies redness    7. PAIN: \"Is there any pain?\" If Yes, ask: \"How bad is it?\"  (Scale 1-10; or mild, moderate, severe)        Controlled with tylenol as needed     8. BLEEDING: \"Is there any bleeding?\" If Yes, ask: \"How much?\" and \"Where?\"        Minimal bleeding     9. DRAINAGE: \"Is there any drainage from the incision site?\" If Yes, ask: \"What color and how much?\" (e.g., red, cloudy, pus; drops, teaspoon)        Denies     10. FEVER: \"Do you have a fever?\" If Yes, ask: \"What is your temperature, how was it measured, and when did it start?\"          Denies     11. OTHER SYMPTOMS: \"Do you have any other symptoms?\" (e.g., dizziness, " rash elsewhere on body, shaking chills, weakness)          Denies    Protocols used: Post-Op Incision Symptoms and Questions-Adult-AH

## 2025-05-26 ENCOUNTER — NURSE TRIAGE (OUTPATIENT)
Dept: OTHER | Facility: OTHER | Age: 40
End: 2025-05-26

## 2025-05-26 ENCOUNTER — HOSPITAL ENCOUNTER (OUTPATIENT)
Facility: HOSPITAL | Age: 40
Discharge: HOME | End: 2025-05-26
Attending: OBSTETRICS & GYNECOLOGY | Admitting: OBSTETRICS & GYNECOLOGY
Payer: COMMERCIAL

## 2025-05-26 VITALS
DIASTOLIC BLOOD PRESSURE: 67 MMHG | RESPIRATION RATE: 20 BRPM | HEART RATE: 95 BPM | SYSTOLIC BLOOD PRESSURE: 128 MMHG | OXYGEN SATURATION: 96 % | HEIGHT: 72 IN | BODY MASS INDEX: 27.18 KG/M2

## 2025-05-26 PROCEDURE — NC001 PR NO CHARGE: Performed by: OBSTETRICS & GYNECOLOGY

## 2025-05-26 PROCEDURE — G0463 HOSPITAL OUTPT CLINIC VISIT: HCPCS

## 2025-05-26 PROCEDURE — 99212 OFFICE O/P EST SF 10 MIN: CPT

## 2025-05-26 NOTE — TELEPHONE ENCOUNTER
"FOLLOW UP: None at this time. For informational purposes only    REASON FOR CONVERSATION: Post-op Problem    SYMPTOMS: meplex dressing is soaked with sanguinous fluid 1/3 of the way on right side     OTHER: on call Provider paged and stated if the dressing becomes saturated at 75% then patient should present to LD. For now, patient can monitor. Advised patient she can make a wade on the outline of current drainage to monitor for increase. She verbalized understanding.     DISPOSITION: Call PCP Now      Reason for Disposition   Dressing soaked with blood or body fluid (e.g., drainage)    Answer Assessment - Initial Assessment Questions  1. SYMPTOM: \"What's the main symptom you're concerned about?\" (e.g., drainage, incision opened up, pain, redness)        The area is sore and bleeding. Denies fever.     2. ONSET: \"When did the bleeding  start?\"        Last night into this morning    3. SURGERY: \"What surgery did you have?\"        C section incision was drained and packed yesterday    4. DATE of SURGERY: \"When was the surgery?\"         5/26/25    5. INCISION SITE: \"Where is the incision located?\"         C section site    6. REDNESS: \"Is there any redness at the incision site?\" If Yes, ask: \"How wide across is the redness?\" (Inches, centimeters)         No    7. PAIN: \"Is there any pain?\" If Yes, ask: \"How bad is it?\"  (Scale 1-10; or mild, moderate, severe)        Soreness    8. BLEEDING: \"Is there any bleeding?\" If Yes, ask: \"How much?\" and \"Where?\"        1/3 of the Meplex is covered with blood on the right side    9. DRAINAGE: \"Is there any drainage from the incision site?\" If Yes, ask: \"What color and how much?\" (e.g., red, cloudy, pus; drops, teaspoon)        Denies    10. FEVER: \"Do you have a fever?\" If Yes, ask: \"What is your temperature, how was it measured, and when did it start?\"          Denies    Protocols used: Post-Op Incision Symptoms and Questions-Adult-    "

## 2025-05-26 NOTE — TELEPHONE ENCOUNTER
"Regarding: post op complication / incision bleed  ----- Message from Barbie OVIEDO sent at 2025  9:51 AM EDT -----  Patient stated, \"I had my  packed and drained yesterday. They said to call back if I was bleeding again. I am bleeding and I think I may need to go to the hospital. It had started overnight and I would say it has leaked about 3/4 of the gauze. If going to the hospital, I would prefer the [facility].\"    "

## 2025-05-26 NOTE — PROGRESS NOTES
Progress Note - OB/GYN   Name: Erika Coburn 39 y.o. female I MRN: [id number]  Unit/Bed#: LD TRIAGE 1-01 I Date of Admission: 5/26/2025   Date of Service: 5/26/2025 I Hospital Day: 0     Assessment & Plan      Hematoma rectus sheath and pelvis based on CT of area   S/p opening of incision yesterday and packing of incision yesterday.     Pt returns today because she is concerned about dark brown drainage on mepilex   No evidence of active bleeding, so dressing not disrupted- exam benign   Significant hematoma/ecchymosis mons/lower abdomen, pt and FOB state no change in   In this over the last several days   As staining is dark brown/old decision was made not to remove dressing and disrupt packing and dressing and create new active bleeding   This was discussed with pt and FOB and they are comfortable with this. They are aware that if entire dressing becomes soaked or blood is bright red, to call office and may need to re-evaluate.    CC  I'm concerned about the blood on my dressing    HPI  Pt seen yesterday for drainage from incision.  Had incision opened in 2 cm area, and packed and mepilex placed.  She had c/s 5/19 with no obvious complications at the time.  Post op was noted to have low Hgb and CT revealed large collections of blood in pelvis and abdominal wall with no evidence of ongoing bleed.  Pt had several units pRBC and Hgb stabilized and pt discharged.     Exam  VS stable, HR under 90, /67    Abd-    Large area ecchymosis/hematoma lower abdominal wall and mons- appears old (and pt states no change in several days)   Mepilex with dark brown staining from old blood on less than half of the dressing, over right side of dressing   Pressure applied over dressing and no blood expressed with pressure

## 2025-05-27 ENCOUNTER — POSTPARTUM VISIT (OUTPATIENT)
Dept: OBGYN CLINIC | Facility: CLINIC | Age: 40
End: 2025-05-27

## 2025-05-27 ENCOUNTER — TELEPHONE (OUTPATIENT)
Age: 40
End: 2025-05-27

## 2025-05-27 VITALS
HEIGHT: 72 IN | BODY MASS INDEX: 25.84 KG/M2 | DIASTOLIC BLOOD PRESSURE: 82 MMHG | SYSTOLIC BLOOD PRESSURE: 116 MMHG | WEIGHT: 190.8 LBS

## 2025-05-27 LAB — PLACENTA IN STORAGE: NORMAL

## 2025-05-27 PROCEDURE — 99024 POSTOP FOLLOW-UP VISIT: CPT | Performed by: OBSTETRICS & GYNECOLOGY

## 2025-05-27 NOTE — UTILIZATION REVIEW
"  MOTHER AND BABY DISCHARGED MAY 23    NOTIFICATION OF INPATIENT ADMISSION   MATERNITY/DELIVERY AUTHORIZATION REQUEST   SERVICING FACILITY:   [facility] - L&D, [place], NICU  [address] [place] [address]  Tax ID: [id number]  NPI: [id number] ATTENDING PROVIDER:  Attending Name and NPI#: Raf Tse Md [[id number]]  [facility] [address] [facility] [address] [address]  Phone: [phone]     ADMISSION INFORMATION:  Place of Service: Inpatient [facility]  Place of Service Code: 21  Inpatient Admission Date/Time: 25  9:37 AM  Discharge Date/Time: 2025  5:20 PM  Admitting Diagnosis Code/Description:  No admission diagnoses are documented for this encounter.     Mother: Erika Coburn 1985 Estimated Date of Delivery: 25  Delivering clinician: Raf Tse   OB History          2    Para   2    Term   2       0    AB   0    Living   2         SAB   0    IAB   0    Ectopic   0    Multiple   0    Live Births   2                Name & MRN:   Information for the patient's :  Vernon Coburn [[id number]]    Delivery Information:  Sex: male  Delivered 2025 11:13 AM by , Low Transverse; Gestational Age: 39w1d     Measurements:  Weight: 6 lb 1 oz (2750 g);  Height: 17.5\"    APGAR 1 minute 5 minutes 10 minutes   Totals: 8 9       UTILIZATION REVIEW CONTACT:  Anna Marie Vela, Utilization   Network Utilization Review Department  Phone: [phone]  Fax [phone]  Email: [email or web address]  Contact for approvals/pending authorizations, clinical reviews, and discharge.     PHYSICIAN ADVISORY SERVICES:  Medical Necessity Denial & Uyov-tu-Leqd Review  Phone: [phone]  Fax: [phone]  Email: [email or web address]     DISCHARGE SUPPORT TEAM:  For Patients Discharge Needs & Updates  Phone: [phone] opt. 2 Fax: [phone]  Email: " [email or web address]     NOTIFICATION OF ADMISSION DISCHARGE   This is a Notification of Discharge from [facility]. Please be advised that this patient has been discharge from our facility. Below you will find the admission and discharge date and time including the patient’s disposition.   UTILIZATION REVIEW CONTACT:  Utilization Review Assistants  Network Utilization Review Department  Phone: [phone] x carefully listen to the prompts. All voicemails are confidential.  Email: [email or web address]     ADMISSION INFORMATION  PRESENTATION DATE: 5/19/2025  8:02 AM  OBERVATION ADMISSION DATE: N/A  INPATIENT ADMISSION DATE: 5/19/25  9:37 AM   DISCHARGE DATE: 5/23/2025  5:20 PM   DISPOSITION:Home/Self Care    Network Utilization Review Department  ATTENTION: Please call with any questions or concerns to [phone] and carefully listen to the prompts so that you are directed to the right person. All voicemails are confidential.   For Discharge needs, contact Care Management DC Support Team at [phone] opt. 2  Send all requests for admission clinical reviews, approved or denied determinations and any other requests to dedicated fax number below belonging to the campus where the patient is receiving treatment. List of dedicated fax numbers for the Facilities:  FACILITY NAME UR FAX NUMBER   ADMISSION DENIALS (Administrative/Medical Necessity) [phone]   DISCHARGE SUPPORT TEAM ([facility]) [phone]   PARENT CHILD HEALTH (Maternity/NICU/Pediatrics) [phone]   [facility] [phone]   [facility] [phone]   [facility] [phone]   [facility] [phone]   [facility] [phone]   [facility] [phone]   [facility] [phone]   [facility]  [place] [phone]   [facility] [phone]   [facility] [phone]   [facility] [phone]   [facility] [phone]

## 2025-05-27 NOTE — TELEPHONE ENCOUNTER
Patient called in stating she had additional questions she forgot to ask at her appointment today.  If she has any discharge or bleeding from her wound what should she do?  Or should she expect some of that?    Please advise.

## 2025-05-28 ENCOUNTER — TELEPHONE (OUTPATIENT)
Facility: HOSPITAL | Age: 40
End: 2025-05-28

## 2025-05-28 ENCOUNTER — TELEPHONE (OUTPATIENT)
Dept: OBGYN CLINIC | Facility: CLINIC | Age: 40
End: 2025-05-28

## 2025-05-28 NOTE — TELEPHONE ENCOUNTER
Left VM for Erika in regards to [facility] paperwork that I received on 5/27 and dates. Requesting Erika give me a  call back at [phone]   no

## 2025-05-28 NOTE — TELEPHONE ENCOUNTER
POSTPARTUM PHONE CALL ASSESSMENT    Date of Delivery: 25  Delivering Provider: Dr. Tse  Mode: LTCS  Delivery Notes/Complications: She was admitted at 39w1d for scheduled repeat . The post delivery course was remarkable. On POD#1 patient reported feeling dizzy. Imaging was ordered which showed    Large 16 cm hematoma in the pelvis, with no definite evidence of active hemorrhage.     Extensive rectus sheath hematoma in the pelvic wall, with no definite evidence of active hemorrhage.     She was transfused 3u PRBC. Hb remained stable. She was also noted have elevated Bps and started on procardia as well.    Was readmitted to L/D on 25 due to bleeding from incision site. (Had incision opened in 2 cm area, and packed and mepilex placed).     Do you still have bleeding/pain? If so, how much/how severe? She states that she still has some vaginal bleeding but it is very light. Denies clots. She still has some soreness as Dr. Tse has to remove the packing but not to painful and only take Tylenol prn.       Regular BMs/Urination? She states normal BM however since she still has the catheter in, she still has some discomfort urinating but no difficulty urinating. She states her urine is clear mixed with some blood due to pp bleeding.     Breastfeeding/Formula/Both? breastfeeding    How are you doing emotionally? She states she feel good.    If struggling, obtain a EPDS Score: N/A    Do you have any other questions or concerns for us or your provider? She had a question she forgot to ask Dr. Tse yesterday, if she has any discharge or bleeding from her wound what should she do? Or should she expect some of that? Advised her that Dr. Tse advised she may have little bleeding and discharge from the incision site. However if the steristrips come off and the wound is open then we will need to see her again and just call to schedule an appointment. She verbalized understanding.     Have you scheduled  the pediatrician appointment with pediatrician? yes    Do you have a postpartum visit scheduled? yes   Date scheduled: 6/4/25 Provider: Dr. Tse

## 2025-05-29 ENCOUNTER — TELEPHONE (OUTPATIENT)
Facility: HOSPITAL | Age: 40
End: 2025-05-29

## 2025-05-29 NOTE — TELEPHONE ENCOUNTER
Left VM for Erika that her intermittent FMLA paperwork was completed, reviewed and signed by Dr. Mays. It was directly faxed to [employer] with the claims # and fax number provided. Informed pt to followup with carrier and if have any questions to please give us a call back [phone]

## 2025-06-04 ENCOUNTER — OFFICE VISIT (OUTPATIENT)
Dept: OBGYN CLINIC | Facility: CLINIC | Age: 40
End: 2025-06-04

## 2025-06-04 VITALS
DIASTOLIC BLOOD PRESSURE: 68 MMHG | HEIGHT: 72 IN | WEIGHT: 186 LBS | SYSTOLIC BLOOD PRESSURE: 102 MMHG | BODY MASS INDEX: 25.19 KG/M2

## 2025-06-04 PROCEDURE — 99024 POSTOP FOLLOW-UP VISIT: CPT | Performed by: OBSTETRICS & GYNECOLOGY

## 2025-06-11 ENCOUNTER — POSTPARTUM VISIT (OUTPATIENT)
Dept: OBGYN CLINIC | Facility: CLINIC | Age: 40
End: 2025-06-11

## 2025-06-11 VITALS
BODY MASS INDEX: 24.65 KG/M2 | SYSTOLIC BLOOD PRESSURE: 122 MMHG | WEIGHT: 182 LBS | DIASTOLIC BLOOD PRESSURE: 78 MMHG | HEIGHT: 72 IN

## 2025-06-11 PROCEDURE — 99024 POSTOP FOLLOW-UP VISIT: CPT | Performed by: OBSTETRICS & GYNECOLOGY

## 2025-06-22 RX ORDER — LABETALOL 200 MG/1
200 TABLET, FILM COATED ORAL 2 TIMES DAILY
Qty: 60 TABLET | Refills: 0 | Status: SHIPPED | OUTPATIENT
Start: 2025-06-22

## 2025-06-24 NOTE — PROGRESS NOTES
"Name: Erika Coburn      : 1985      MRN: [id number]  Encounter Provider: Raf Tse MD  Encounter Date: 2025   Encounter department: [facility] [place]  :  Assessment & Plan  Routine postpartum follow-up  Packing removed. No active bleeding. Steristrips placed to reapproximate the skin in an attempt to speed healing. Will reexamine in a week.           History of Present Illness   HPI  Erika Coburn is a 39 y.o. female who presents for a wound check   History obtained from: patient    Review of Systems  Medications Ordered Prior to Encounter[1]   Social History[2]     Objective   /82 (BP Location: Left arm, Patient Position: Sitting, Cuff Size: Standard)   Ht 6' 1\" (1.854 m)   Wt 86.5 kg (190 lb 12.8 oz)   Breastfeeding Yes   BMI 25.17 kg/m²      Physical Exam    Packing removed. No active bleeding. Skin edges appear healthy. No erythema           [1]   Current Outpatient Medications on File Prior to Visit   Medication Sig Dispense Refill    acetaminophen (TYLENOL) 325 mg tablet Take 2 tablets (650 mg total) by mouth every 6 (six) hours as needed for mild pain (If pain not adequately controlled with motrin only)      diphenhydrAMINE (BENADRYL) 25 mg tablet Take 1 tablet (25 mg total) by mouth every 6 (six) hours as needed for itching or sleep (Patient not taking: Reported on 2025)      ibuprofen (MOTRIN) 600 mg tablet Take 1 tablet (600 mg total) by mouth every 6 (six) hours as needed for mild pain (Patient not taking: Reported on 2025)      NIFEdipine (ADALAT CC) 60 MG 24 hr tablet Take 1 tablet (60 mg total) by mouth daily (Patient not taking: Reported on 2025) 30 tablet 1    Prenatal MV-Min-Fe Fum-FA-DHA (PRENATAL 1 PO) Take by mouth      Calcium Carbonate (CALCIUM 500 PO) Take by mouth 1000mg daily (Patient not taking: Reported on 2025)       No current facility-administered medications on file prior to visit.   [2] "   Social History  Tobacco Use    Smoking status: Former     Current packs/day: 0.00     Types: Cigarettes     Quit date: 2014     Years since quittin.0     Passive exposure: Never    Smokeless tobacco: Never   Vaping Use    Vaping status: Never Used   Substance and Sexual Activity    Alcohol use: Not Currently     Alcohol/week: 2.0 standard drinks of alcohol    Drug use: Never    Sexual activity: Yes     Partners: Male     Birth control/protection: None

## 2025-06-27 NOTE — PROGRESS NOTES
"[facility] [address], [address], [place] [address]    Assessment/Plan:  Erika is a 39 y.o. year old  who presents for postpartum visit.    Routine Postpartum Care  Normal postpartum exam  Contraception: none  Depression Screen: low risk  Feeding: breast  Psychosocial support: good  Patient Education: \"Fourth Trimester Project: [employer]\"  Cervical cancer screening Up to Date, next due   Follow up in: 3 months or as needed.    Additional Problems:  1. Routine postpartum follow-up        Subjective:     CC: Postpartum visit    Erika Coburn is a 39 y.o. y.o. female  who presents for a postpartum visit.     She is 3 weeks postpartum following a low cervical transverse  section on 25 at 39.1 weeks.    Outcome: repeat  section, low transverse incision. Anesthesia: spinal. Postpartum course has been remarkable for an intraabdominal hematoma. This did not require surgical intervention but she did receive blood. Baby's course has been unremarkable. Baby is feeding by breast.     Bleeding staining only. Bowel function is normal. Bladder function is normal. Patient is not sexually active. Contraception method is none - infertility. Postpartum depression screening: negative.    The following portions of the patient's history were reviewed and updated as appropriate: allergies, current medications, past family history, past medical history, obstetric history, gynecologic history, past social history, past surgical history and problem list.      Objective:  /78 (BP Location: Left arm, Patient Position: Sitting, Cuff Size: Standard)   Ht 6' 1\" (1.854 m)   Wt 82.6 kg (182 lb)   Breastfeeding Yes   BMI 24.01 kg/m²   Pregravid Weight/BMI: 80.3 kg (177 lb) (BMI 23.36)  Current Weight: 82.6 kg (182 lb)   Total Weight Gain: 13.2 kg (29 lb)   Pre-Katelynn Vitals      Flowsheet Row Most Recent Value   Prenatal Assessment    Prenatal Vitals  "   Blood Pressure 122/78   Weight - Scale 82.6 kg (182 lb)   Urine Albumin/Glucose    Dilation/Effacement/Station    Vaginal Drainage    Edema              General: Well appearing, no distress.  Mood and affect: Appropriate.  Abdomen: Soft, nontender  Thyroid: No masses  Incision: well approximated. Prior separation is now intact.  Vulva: Well healed  Vagina: Well healed. No lesions  Urethra: Normal  Cervix: Healed, no lesions  Uterus: Normal size, no masses  Adnexa: No pain or masses  Extremities: Warm and well perfused.  Non tender.

## 2025-06-30 NOTE — PROGRESS NOTES
"Name: Erika Coburn      : 1985      MRN: [id number]  Encounter Provider: Raf Tse MD  Encounter Date: 2025   Encounter department: [facility] [place]  :  Assessment & Plan  Wound hematoma following  section, postpartum  No further bleeding and wound edges are well approximated. Will reevaluate in 1 week       Routine postpartum follow-up             History of Present Illness   HPI  Erika Coburn is a 39 y.o. female who presents for a wound check   History obtained from: patient    Review of Systems  Medications Ordered Prior to Encounter[1]   Social History[2]     Objective   /68 (BP Location: Left arm, Patient Position: Sitting, Cuff Size: Standard)   Ht 6' 1\" (1.854 m)   Wt 84.4 kg (186 lb)   Breastfeeding Yes   BMI 24.54 kg/m²      Physical Exam    Packing removed. No active bleeding. Skin edges appear healthy. No erythema           [1]   Current Outpatient Medications on File Prior to Visit   Medication Sig Dispense Refill    Prenatal MV-Min-Fe Fum-FA-DHA (PRENATAL 1 PO) Take by mouth      Calcium Carbonate (CALCIUM 500 PO) Take by mouth 1000mg daily (Patient not taking: Reported on 2025)       No current facility-administered medications on file prior to visit.   [2]   Social History  Tobacco Use    Smoking status: Former     Current packs/day: 0.00     Types: Cigarettes     Quit date: 2014     Years since quittin.0     Passive exposure: Never    Smokeless tobacco: Never   Vaping Use    Vaping status: Never Used   Substance and Sexual Activity    Alcohol use: Not Currently     Alcohol/week: 2.0 standard drinks of alcohol    Drug use: Never    Sexual activity: Yes     Partners: Male     Birth control/protection: None     "

## 2025-08-11 ENCOUNTER — OFFICE VISIT (OUTPATIENT)
Dept: URGENT CARE | Facility: CLINIC | Age: 40
End: 2025-08-11
Payer: COMMERCIAL

## 2025-08-19 ENCOUNTER — OFFICE VISIT (OUTPATIENT)
Dept: URGENT CARE | Facility: CLINIC | Age: 40
End: 2025-08-19
Payer: COMMERCIAL

## 2025-08-19 VITALS — OXYGEN SATURATION: 98 % | TEMPERATURE: 98.1 F | HEART RATE: 78 BPM | RESPIRATION RATE: 17 BRPM

## 2025-08-19 DIAGNOSIS — L03.031 CELLULITIS OF TOE OF RIGHT FOOT: Primary | ICD-10-CM

## 2025-08-19 PROCEDURE — S9083 URGENT CARE CENTER GLOBAL: HCPCS | Performed by: FAMILY MEDICINE

## 2025-08-19 PROCEDURE — G0382 LEV 3 HOSP TYPE B ED VISIT: HCPCS | Performed by: FAMILY MEDICINE

## 2025-08-19 RX ORDER — CEPHALEXIN 500 MG/1
500 CAPSULE ORAL EVERY 12 HOURS SCHEDULED
Qty: 14 CAPSULE | Refills: 0 | Status: SHIPPED | OUTPATIENT
Start: 2025-08-19 | End: 2025-08-26

## 2025-08-27 PROBLEM — M79.674 PAIN IN RIGHT TOE(S): Status: ACTIVE | Noted: 2025-08-27

## 2025-08-27 PROBLEM — S90.221A CONTUSION OF LESSER TOE OF RIGHT FOOT WITH DAMAGE TO NAIL: Status: ACTIVE | Noted: 2025-08-27

## (undated) DEVICE — SKIN MARKER DUAL TIP WITH RULER CAP, FLEXIBLE RULER AND LABELS: Brand: DEVON

## (undated) DEVICE — GLOVE INDICATOR PI UNDERGLOVE SZ 7.5 BLUE

## (undated) DEVICE — STAPLER INSORB SUBCUTICULAR 30 SINGLE USE

## (undated) DEVICE — 3M™ STERI-STRIP™ COMPOUND BENZOIN TINCTURE 40 BAGS/CARTON 4 CARTONS/CASE C1544: Brand: 3M™ STERI-STRIP™

## (undated) DEVICE — GLOVE PI ULTRA TOUCH SZ.7.0

## (undated) DEVICE — PACK C-SECTION PBDS

## (undated) DEVICE — 3M™ STERI-STRIP™ REINFORCED ADHESIVE SKIN CLOSURES, R1547, 1/2 IN X 4 IN (12 MM X 100 MM), 6 STRIPS/ENVELOPE: Brand: 3M™ STERI-STRIP™

## (undated) DEVICE — Device

## (undated) DEVICE — TELFA NON-ADHERENT ABSORBENT DRESSING: Brand: TELFA

## (undated) DEVICE — ABDOMINAL PAD: Brand: DERMACEA

## (undated) DEVICE — SUT CHROMIC 0 CT-1 27 IN 812H

## (undated) DEVICE — MEDI-VAC YANKAUER SUCTION HANDLE W/STRAIGHT TIP & CONTROL VENT: Brand: CARDINAL HEALTH

## (undated) DEVICE — GLOVE SRG LF STRL BGL SKNSNS 8 PF

## (undated) DEVICE — CHLORAPREP HI-LITE 26ML ORANGE

## (undated) DEVICE — SUT VICRYL 4-0 PS-2 18 IN J496G

## (undated) DEVICE — DECANTER: Brand: UNBRANDED

## (undated) DEVICE — SUT VICRYL 0 CTX 36 IN J978H

## (undated) DEVICE — SUT PLAIN 2-0 CTX 27 IN 872H

## (undated) DEVICE — SUTURE VICRYL 0 27IN CTX